# Patient Record
Sex: MALE | Race: WHITE | Employment: UNEMPLOYED | URBAN - METROPOLITAN AREA
[De-identification: names, ages, dates, MRNs, and addresses within clinical notes are randomized per-mention and may not be internally consistent; named-entity substitution may affect disease eponyms.]

---

## 2018-04-12 ENCOUNTER — HOSPITAL ENCOUNTER (EMERGENCY)
Age: 48
Discharge: LEFT BEFORE DISCHARGE | End: 2018-04-12
Payer: COMMERCIAL

## 2018-04-12 ENCOUNTER — APPOINTMENT (OUTPATIENT)
Dept: GENERAL RADIOLOGY | Age: 48
End: 2018-04-12
Payer: COMMERCIAL

## 2018-04-12 VITALS
HEART RATE: 78 BPM | DIASTOLIC BLOOD PRESSURE: 97 MMHG | SYSTOLIC BLOOD PRESSURE: 161 MMHG | HEIGHT: 73 IN | OXYGEN SATURATION: 98 % | TEMPERATURE: 98.4 F | WEIGHT: 133.8 LBS | BODY MASS INDEX: 17.73 KG/M2 | RESPIRATION RATE: 18 BRPM

## 2018-04-12 DIAGNOSIS — J44.1 COPD EXACERBATION (HCC): Primary | ICD-10-CM

## 2018-04-12 DIAGNOSIS — S92.502A CLOSED DISPLACED FRACTURE OF PHALANX OF LESSER TOE OF LEFT FOOT, UNSPECIFIED PHALANX, INITIAL ENCOUNTER: ICD-10-CM

## 2018-04-12 DIAGNOSIS — Z53.29 LEFT AGAINST MEDICAL ADVICE: ICD-10-CM

## 2018-04-12 DIAGNOSIS — L03.032 CELLULITIS OF TOE OF LEFT FOOT: ICD-10-CM

## 2018-04-12 DIAGNOSIS — N17.9 ACUTE KIDNEY INJURY (HCC): ICD-10-CM

## 2018-04-12 LAB
ABSOLUTE EOS #: 0.5 K/UL (ref 0–0.4)
ABSOLUTE IMMATURE GRANULOCYTE: ABNORMAL K/UL (ref 0–0.3)
ABSOLUTE LYMPH #: 2.6 K/UL (ref 1–4.8)
ABSOLUTE MONO #: 1.2 K/UL (ref 0.2–0.8)
ANION GAP SERPL CALCULATED.3IONS-SCNC: 15 MMOL/L (ref 9–17)
BASOPHILS # BLD: 1 % (ref 0–2)
BASOPHILS ABSOLUTE: 0.1 K/UL (ref 0–0.2)
BILIRUBIN URINE: NEGATIVE
BNP INTERPRETATION: NORMAL
BUN BLDV-MCNC: 16 MG/DL (ref 6–20)
BUN/CREAT BLD: 12 (ref 9–20)
C-REACTIVE PROTEIN: 2.5 MG/L (ref 0–5)
CALCIUM SERPL-MCNC: 9.9 MG/DL (ref 8.6–10.4)
CHLORIDE BLD-SCNC: 98 MMOL/L (ref 98–107)
CO2: 25 MMOL/L (ref 20–31)
COLOR: YELLOW
COMMENT UA: ABNORMAL
CREAT SERPL-MCNC: 1.34 MG/DL (ref 0.7–1.2)
DIFFERENTIAL TYPE: ABNORMAL
EKG ATRIAL RATE: 69 BPM
EKG P AXIS: 77 DEGREES
EKG P-R INTERVAL: 138 MS
EKG Q-T INTERVAL: 374 MS
EKG QRS DURATION: 98 MS
EKG QTC CALCULATION (BAZETT): 400 MS
EKG R AXIS: 85 DEGREES
EKG T AXIS: 75 DEGREES
EKG VENTRICULAR RATE: 69 BPM
EOSINOPHILS RELATIVE PERCENT: 3 % (ref 1–4)
GFR AFRICAN AMERICAN: >60 ML/MIN
GFR NON-AFRICAN AMERICAN: 57 ML/MIN
GFR SERPL CREATININE-BSD FRML MDRD: ABNORMAL ML/MIN/{1.73_M2}
GFR SERPL CREATININE-BSD FRML MDRD: ABNORMAL ML/MIN/{1.73_M2}
GLUCOSE BLD-MCNC: 114 MG/DL (ref 70–99)
GLUCOSE URINE: NEGATIVE
HCT VFR BLD CALC: 45.9 % (ref 41–53)
HEMOGLOBIN: 15.5 G/DL (ref 13.5–17.5)
IMMATURE GRANULOCYTES: ABNORMAL %
KETONES, URINE: NEGATIVE
LACTIC ACID: 1.6 MMOL/L (ref 0.5–2.2)
LACTIC ACID: 2.5 MMOL/L (ref 0.5–2.2)
LEUKOCYTE ESTERASE, URINE: NEGATIVE
LYMPHOCYTES # BLD: 18 % (ref 24–44)
MAGNESIUM: 2 MG/DL (ref 1.6–2.6)
MCH RBC QN AUTO: 33.1 PG (ref 26–34)
MCHC RBC AUTO-ENTMCNC: 33.7 G/DL (ref 31–37)
MCV RBC AUTO: 98.4 FL (ref 80–100)
MONOCYTES # BLD: 8 % (ref 1–7)
MYOGLOBIN: 28 NG/ML (ref 28–72)
NITRITE, URINE: NEGATIVE
NRBC AUTOMATED: ABNORMAL PER 100 WBC
PDW BLD-RTO: 18.5 % (ref 11.5–14.5)
PH UA: 6 (ref 5–8)
PLATELET # BLD: 824 K/UL (ref 130–400)
PLATELET ESTIMATE: ABNORMAL
PMV BLD AUTO: 7.1 FL (ref 6–12)
POTASSIUM SERPL-SCNC: 4.2 MMOL/L (ref 3.7–5.3)
PRO-BNP: 144 PG/ML
PROTEIN UA: NEGATIVE
RBC # BLD: 4.67 M/UL (ref 4.5–5.9)
RBC # BLD: ABNORMAL 10*6/UL
SEDIMENTATION RATE, ERYTHROCYTE: 16 MM (ref 0–15)
SEG NEUTROPHILS: 70 % (ref 36–66)
SEGMENTED NEUTROPHILS ABSOLUTE COUNT: 10.4 K/UL (ref 1.8–7.7)
SODIUM BLD-SCNC: 138 MMOL/L (ref 135–144)
SPECIFIC GRAVITY UA: 1 (ref 1–1.03)
TROPONIN INTERP: NORMAL
TROPONIN T: <0.03 NG/ML
TURBIDITY: CLEAR
URINE HGB: NEGATIVE
UROBILINOGEN, URINE: NORMAL
WBC # BLD: 14.8 K/UL (ref 3.5–11)
WBC # BLD: ABNORMAL 10*3/UL

## 2018-04-12 PROCEDURE — 83605 ASSAY OF LACTIC ACID: CPT

## 2018-04-12 PROCEDURE — 83735 ASSAY OF MAGNESIUM: CPT

## 2018-04-12 PROCEDURE — 93005 ELECTROCARDIOGRAM TRACING: CPT

## 2018-04-12 PROCEDURE — 36415 COLL VENOUS BLD VENIPUNCTURE: CPT

## 2018-04-12 PROCEDURE — 84484 ASSAY OF TROPONIN QUANT: CPT

## 2018-04-12 PROCEDURE — 6360000002 HC RX W HCPCS: Performed by: NURSE PRACTITIONER

## 2018-04-12 PROCEDURE — 83880 ASSAY OF NATRIURETIC PEPTIDE: CPT

## 2018-04-12 PROCEDURE — 71046 X-RAY EXAM CHEST 2 VIEWS: CPT

## 2018-04-12 PROCEDURE — 80048 BASIC METABOLIC PNL TOTAL CA: CPT

## 2018-04-12 PROCEDURE — 94640 AIRWAY INHALATION TREATMENT: CPT

## 2018-04-12 PROCEDURE — 83874 ASSAY OF MYOGLOBIN: CPT

## 2018-04-12 PROCEDURE — 85025 COMPLETE CBC W/AUTO DIFF WBC: CPT

## 2018-04-12 PROCEDURE — 87040 BLOOD CULTURE FOR BACTERIA: CPT

## 2018-04-12 PROCEDURE — 96374 THER/PROPH/DIAG INJ IV PUSH: CPT

## 2018-04-12 PROCEDURE — 99285 EMERGENCY DEPT VISIT HI MDM: CPT

## 2018-04-12 PROCEDURE — 73630 X-RAY EXAM OF FOOT: CPT

## 2018-04-12 PROCEDURE — 85651 RBC SED RATE NONAUTOMATED: CPT

## 2018-04-12 PROCEDURE — 94150 VITAL CAPACITY TEST: CPT

## 2018-04-12 PROCEDURE — 2580000003 HC RX 258: Performed by: NURSE PRACTITIONER

## 2018-04-12 PROCEDURE — 81003 URINALYSIS AUTO W/O SCOPE: CPT

## 2018-04-12 PROCEDURE — 86140 C-REACTIVE PROTEIN: CPT

## 2018-04-12 PROCEDURE — 96375 TX/PRO/DX INJ NEW DRUG ADDON: CPT

## 2018-04-12 RX ORDER — METHYLPREDNISOLONE SODIUM SUCCINATE 125 MG/2ML
125 INJECTION, POWDER, LYOPHILIZED, FOR SOLUTION INTRAMUSCULAR; INTRAVENOUS ONCE
Status: COMPLETED | OUTPATIENT
Start: 2018-04-12 | End: 2018-04-12

## 2018-04-12 RX ORDER — ALBUTEROL SULFATE 90 UG/1
2 AEROSOL, METERED RESPIRATORY (INHALATION)
Status: DISCONTINUED | OUTPATIENT
Start: 2018-04-12 | End: 2018-04-12 | Stop reason: HOSPADM

## 2018-04-12 RX ORDER — MORPHINE SULFATE 4 MG/ML
4 INJECTION, SOLUTION INTRAMUSCULAR; INTRAVENOUS ONCE
Status: COMPLETED | OUTPATIENT
Start: 2018-04-12 | End: 2018-04-12

## 2018-04-12 RX ORDER — AMLODIPINE BESYLATE 10 MG/1
10 TABLET ORAL DAILY
COMMUNITY
End: 2018-04-20 | Stop reason: SDUPTHER

## 2018-04-12 RX ORDER — CLONIDINE HYDROCHLORIDE 0.1 MG/1
0.1 TABLET ORAL 2 TIMES DAILY
Qty: 20 TABLET | Refills: 0 | Status: SHIPPED | OUTPATIENT
Start: 2018-04-12 | End: 2018-04-20

## 2018-04-12 RX ORDER — AMLODIPINE BESYLATE 10 MG/1
10 TABLET ORAL DAILY
Qty: 10 TABLET | Refills: 0 | Status: SHIPPED | OUTPATIENT
Start: 2018-04-12 | End: 2018-04-20

## 2018-04-12 RX ORDER — ALBUTEROL SULFATE 90 UG/1
2 AEROSOL, METERED RESPIRATORY (INHALATION) EVERY 6 HOURS PRN
Qty: 1 INHALER | Refills: 0 | Status: SHIPPED | OUTPATIENT
Start: 2018-04-12 | End: 2018-09-14 | Stop reason: SDUPTHER

## 2018-04-12 RX ORDER — 0.9 % SODIUM CHLORIDE 0.9 %
1000 INTRAVENOUS SOLUTION INTRAVENOUS ONCE
Status: COMPLETED | OUTPATIENT
Start: 2018-04-12 | End: 2018-04-12

## 2018-04-12 RX ORDER — LISINOPRIL 5 MG/1
5 TABLET ORAL DAILY
Qty: 30 TABLET | Refills: 0 | Status: SHIPPED | OUTPATIENT
Start: 2018-04-12 | End: 2018-04-12

## 2018-04-12 RX ORDER — LISINOPRIL 5 MG/1
5 TABLET ORAL DAILY
Qty: 10 TABLET | Refills: 0 | Status: SHIPPED | OUTPATIENT
Start: 2018-04-12 | End: 2018-04-20

## 2018-04-12 RX ORDER — IPRATROPIUM BROMIDE AND ALBUTEROL SULFATE 2.5; .5 MG/3ML; MG/3ML
1 SOLUTION RESPIRATORY (INHALATION)
Status: DISCONTINUED | OUTPATIENT
Start: 2018-04-12 | End: 2018-04-12 | Stop reason: HOSPADM

## 2018-04-12 RX ORDER — DOXYCYCLINE HYCLATE 100 MG
100 TABLET ORAL 2 TIMES DAILY
Qty: 20 TABLET | Refills: 0 | Status: SHIPPED | OUTPATIENT
Start: 2018-04-12 | End: 2018-04-22

## 2018-04-12 RX ORDER — ALBUTEROL SULFATE 90 UG/1
2 AEROSOL, METERED RESPIRATORY (INHALATION) EVERY 6 HOURS PRN
COMMUNITY
End: 2018-05-08 | Stop reason: SDUPTHER

## 2018-04-12 RX ORDER — CLOPIDOGREL BISULFATE 75 MG/1
75 TABLET ORAL DAILY
COMMUNITY
End: 2018-04-20 | Stop reason: SDUPTHER

## 2018-04-12 RX ORDER — BENZONATATE 100 MG/1
100 CAPSULE ORAL 3 TIMES DAILY PRN
Qty: 21 CAPSULE | Refills: 0 | Status: SHIPPED | OUTPATIENT
Start: 2018-04-12 | End: 2018-04-19

## 2018-04-12 RX ORDER — CLONIDINE HYDROCHLORIDE 0.1 MG/1
0.1 TABLET ORAL 2 TIMES DAILY
Qty: 60 TABLET | Refills: 0 | Status: SHIPPED | OUTPATIENT
Start: 2018-04-12 | End: 2018-04-12

## 2018-04-12 RX ORDER — LISINOPRIL 5 MG/1
5 TABLET ORAL DAILY
COMMUNITY
End: 2018-04-20

## 2018-04-12 RX ORDER — HYDROXYUREA 500 MG/1
1000 CAPSULE ORAL DAILY
Status: ON HOLD | COMMUNITY
End: 2018-04-30 | Stop reason: ALTCHOICE

## 2018-04-12 RX ORDER — AMLODIPINE BESYLATE 10 MG/1
10 TABLET ORAL DAILY
Qty: 30 TABLET | Refills: 0 | Status: SHIPPED | OUTPATIENT
Start: 2018-04-12 | End: 2018-04-12

## 2018-04-12 RX ORDER — CLONIDINE HYDROCHLORIDE 0.1 MG/1
0.1 TABLET ORAL 2 TIMES DAILY
COMMUNITY
End: 2018-04-20

## 2018-04-12 RX ORDER — CEPHALEXIN 500 MG/1
500 CAPSULE ORAL 4 TIMES DAILY
Qty: 40 CAPSULE | Refills: 0 | Status: SHIPPED | OUTPATIENT
Start: 2018-04-12 | End: 2018-04-22

## 2018-04-12 RX ORDER — ALBUTEROL SULFATE 2.5 MG/3ML
5 SOLUTION RESPIRATORY (INHALATION)
Status: DISCONTINUED | OUTPATIENT
Start: 2018-04-12 | End: 2018-04-12 | Stop reason: HOSPADM

## 2018-04-12 RX ADMIN — ALBUTEROL SULFATE 5 MG: 5 SOLUTION RESPIRATORY (INHALATION) at 16:52

## 2018-04-12 RX ADMIN — METHYLPREDNISOLONE SODIUM SUCCINATE 125 MG: 125 INJECTION, POWDER, FOR SOLUTION INTRAMUSCULAR; INTRAVENOUS at 16:44

## 2018-04-12 RX ADMIN — CEFTRIAXONE SODIUM 1 G: 1 INJECTION, POWDER, FOR SOLUTION INTRAMUSCULAR; INTRAVENOUS at 18:51

## 2018-04-12 RX ADMIN — MORPHINE SULFATE 4 MG: 4 INJECTION INTRAVENOUS at 16:44

## 2018-04-12 RX ADMIN — ALBUTEROL SULFATE 5 MG: 5 SOLUTION RESPIRATORY (INHALATION) at 16:42

## 2018-04-12 RX ADMIN — SODIUM CHLORIDE 1000 ML: 9 INJECTION, SOLUTION INTRAVENOUS at 16:44

## 2018-04-12 ASSESSMENT — ENCOUNTER SYMPTOMS
COLOR CHANGE: 1
COUGH: 1
WHEEZING: 1
SHORTNESS OF BREATH: 1

## 2018-04-12 ASSESSMENT — PAIN DESCRIPTION - DESCRIPTORS: DESCRIPTORS: THROBBING;SHARP

## 2018-04-12 ASSESSMENT — PAIN DESCRIPTION - LOCATION: LOCATION: FOOT

## 2018-04-12 ASSESSMENT — PAIN DESCRIPTION - ORIENTATION: ORIENTATION: LEFT

## 2018-04-12 ASSESSMENT — PAIN SCALES - GENERAL
PAINLEVEL_OUTOF10: 5
PAINLEVEL_OUTOF10: 5

## 2018-04-12 ASSESSMENT — PAIN DESCRIPTION - FREQUENCY: FREQUENCY: CONTINUOUS

## 2018-04-18 LAB
CULTURE: NORMAL
Lab: NORMAL
Lab: NORMAL
SPECIMEN DESCRIPTION: NORMAL
STATUS: NORMAL
STATUS: NORMAL

## 2018-04-20 ENCOUNTER — OFFICE VISIT (OUTPATIENT)
Dept: INTERNAL MEDICINE | Age: 48
End: 2018-04-20
Payer: COMMERCIAL

## 2018-04-20 VITALS
SYSTOLIC BLOOD PRESSURE: 148 MMHG | BODY MASS INDEX: 17.23 KG/M2 | HEIGHT: 73 IN | DIASTOLIC BLOOD PRESSURE: 88 MMHG | WEIGHT: 130 LBS | HEART RATE: 71 BPM

## 2018-04-20 DIAGNOSIS — Z13.220 SCREENING FOR HYPERLIPIDEMIA: ICD-10-CM

## 2018-04-20 DIAGNOSIS — I99.8 ISCHEMIA OF TOE: ICD-10-CM

## 2018-04-20 DIAGNOSIS — I73.9 PVD (PERIPHERAL VASCULAR DISEASE) (HCC): ICD-10-CM

## 2018-04-20 DIAGNOSIS — I10 ESSENTIAL HYPERTENSION: Primary | ICD-10-CM

## 2018-04-20 DIAGNOSIS — S92.335A CLOSED NONDISPLACED FRACTURE OF THIRD METATARSAL BONE OF LEFT FOOT, INITIAL ENCOUNTER: ICD-10-CM

## 2018-04-20 DIAGNOSIS — Z00.00 HEALTH CARE MAINTENANCE: ICD-10-CM

## 2018-04-20 DIAGNOSIS — N18.30 STAGE 3 CHRONIC KIDNEY DISEASE (HCC): ICD-10-CM

## 2018-04-20 PROCEDURE — 99204 OFFICE O/P NEW MOD 45 MIN: CPT | Performed by: INTERNAL MEDICINE

## 2018-04-20 PROCEDURE — G8427 DOCREV CUR MEDS BY ELIG CLIN: HCPCS | Performed by: INTERNAL MEDICINE

## 2018-04-20 PROCEDURE — 99215 OFFICE O/P EST HI 40 MIN: CPT

## 2018-04-20 PROCEDURE — G8419 CALC BMI OUT NRM PARAM NOF/U: HCPCS | Performed by: INTERNAL MEDICINE

## 2018-04-20 PROCEDURE — 4004F PT TOBACCO SCREEN RCVD TLK: CPT | Performed by: INTERNAL MEDICINE

## 2018-04-20 RX ORDER — CLOPIDOGREL BISULFATE 75 MG/1
75 TABLET ORAL DAILY
Qty: 90 TABLET | Refills: 1 | Status: SHIPPED | OUTPATIENT
Start: 2018-04-20 | End: 2018-09-14 | Stop reason: SDUPTHER

## 2018-04-20 RX ORDER — TRAMADOL HYDROCHLORIDE 50 MG/1
50 TABLET ORAL DAILY PRN
Qty: 30 TABLET | Refills: 0 | Status: ON HOLD | OUTPATIENT
Start: 2018-04-20 | End: 2018-05-02 | Stop reason: HOSPADM

## 2018-04-20 RX ORDER — AMLODIPINE BESYLATE 5 MG/1
5 TABLET ORAL DAILY
Qty: 30 TABLET | Refills: 2 | Status: SHIPPED | OUTPATIENT
Start: 2018-04-20 | End: 2018-07-09 | Stop reason: SDUPTHER

## 2018-04-20 RX ORDER — ATORVASTATIN CALCIUM 40 MG/1
40 TABLET, FILM COATED ORAL DAILY
Qty: 30 TABLET | Refills: 2 | Status: SHIPPED | OUTPATIENT
Start: 2018-04-20 | End: 2018-07-09 | Stop reason: SDUPTHER

## 2018-04-20 ASSESSMENT — PATIENT HEALTH QUESTIONNAIRE - PHQ9
SUM OF ALL RESPONSES TO PHQ QUESTIONS 1-9: 0
2. FEELING DOWN, DEPRESSED OR HOPELESS: 0
SUM OF ALL RESPONSES TO PHQ9 QUESTIONS 1 & 2: 0
1. LITTLE INTEREST OR PLEASURE IN DOING THINGS: 0

## 2018-04-30 ENCOUNTER — HOSPITAL ENCOUNTER (INPATIENT)
Age: 48
LOS: 2 days | Discharge: HOME | DRG: 207 | End: 2018-05-02
Attending: PODIATRIST | Admitting: PODIATRIST
Payer: COMMERCIAL

## 2018-04-30 ENCOUNTER — HOSPITAL ENCOUNTER (OUTPATIENT)
Age: 48
Setting detail: SPECIMEN
Discharge: HOME | End: 2018-04-30
Payer: COMMERCIAL

## 2018-04-30 ENCOUNTER — OFFICE VISIT (OUTPATIENT)
Dept: PODIATRY | Age: 48
End: 2018-04-30
Payer: COMMERCIAL

## 2018-04-30 ENCOUNTER — HOSPITAL ENCOUNTER (OUTPATIENT)
Age: 48
Setting detail: SPECIMEN
DRG: 207 | End: 2018-04-30
Attending: PODIATRIST
Payer: COMMERCIAL

## 2018-04-30 VITALS
DIASTOLIC BLOOD PRESSURE: 88 MMHG | BODY MASS INDEX: 16.38 KG/M2 | HEART RATE: 85 BPM | WEIGHT: 127.6 LBS | HEIGHT: 74 IN | SYSTOLIC BLOOD PRESSURE: 130 MMHG

## 2018-04-30 DIAGNOSIS — I73.9 PVD (PERIPHERAL VASCULAR DISEASE) (HCC): Primary | ICD-10-CM

## 2018-04-30 DIAGNOSIS — I99.8 ISCHEMIA OF TOE: ICD-10-CM

## 2018-04-30 DIAGNOSIS — I70.229 CRITICAL LOWER LIMB ISCHEMIA (HCC): Primary | ICD-10-CM

## 2018-04-30 DIAGNOSIS — I70.229 CRITICAL LOWER LIMB ISCHEMIA (HCC): ICD-10-CM

## 2018-04-30 DIAGNOSIS — I96 GANGRENE OF TOE OF LEFT FOOT (HCC): ICD-10-CM

## 2018-04-30 DIAGNOSIS — I73.9 PAD (PERIPHERAL ARTERY DISEASE) (HCC): ICD-10-CM

## 2018-04-30 DIAGNOSIS — S92.335A CLOSED NONDISPLACED FRACTURE OF THIRD METATARSAL BONE OF LEFT FOOT, INITIAL ENCOUNTER: ICD-10-CM

## 2018-04-30 LAB
ANION GAP SERPL CALCULATED.3IONS-SCNC: 12 MMOL/L (ref 9–17)
BUN BLDV-MCNC: 19 MG/DL (ref 6–20)
BUN/CREAT BLD: NORMAL (ref 9–20)
CALCIUM SERPL-MCNC: 9.3 MG/DL (ref 8.6–10.4)
CHLORIDE BLD-SCNC: 103 MMOL/L (ref 98–107)
CO2: 23 MMOL/L (ref 20–31)
CREAT SERPL-MCNC: 0.92 MG/DL (ref 0.7–1.2)
GFR AFRICAN AMERICAN: >60 ML/MIN
GFR NON-AFRICAN AMERICAN: >60 ML/MIN
GFR SERPL CREATININE-BSD FRML MDRD: NORMAL ML/MIN/{1.73_M2}
GFR SERPL CREATININE-BSD FRML MDRD: NORMAL ML/MIN/{1.73_M2}
GLUCOSE BLD-MCNC: 81 MG/DL (ref 70–99)
HCT VFR BLD CALC: 41.3 % (ref 40.7–50.3)
HEMOGLOBIN: 13.4 G/DL (ref 13–17)
MCH RBC QN AUTO: 32.1 PG (ref 25.2–33.5)
MCHC RBC AUTO-ENTMCNC: 32.4 G/DL (ref 28.4–34.8)
MCV RBC AUTO: 98.8 FL (ref 82.6–102.9)
NRBC AUTOMATED: 0 PER 100 WBC
PARTIAL THROMBOPLASTIN TIME: 29.4 SEC (ref 20.5–30.5)
PDW BLD-RTO: 15.7 % (ref 11.8–14.4)
PLATELET # BLD: ABNORMAL K/UL (ref 138–453)
PLATELET, FLUORESCENCE: 1298 K/UL (ref 138–453)
PLATELET, IMMATURE FRACTION: 3.4 % (ref 1.1–10.3)
PMV BLD AUTO: ABNORMAL FL (ref 8.1–13.5)
POTASSIUM SERPL-SCNC: 4.4 MMOL/L (ref 3.7–5.3)
RBC # BLD: 4.18 M/UL (ref 4.21–5.77)
SODIUM BLD-SCNC: 138 MMOL/L (ref 135–144)
WBC # BLD: 15.5 K/UL (ref 3.5–11.3)

## 2018-04-30 PROCEDURE — 85730 THROMBOPLASTIN TIME PARTIAL: CPT

## 2018-04-30 PROCEDURE — 4004F PT TOBACCO SCREEN RCVD TLK: CPT | Performed by: PODIATRIST

## 2018-04-30 PROCEDURE — 85055 RETICULATED PLATELET ASSAY: CPT

## 2018-04-30 PROCEDURE — 87081 CULTURE SCREEN ONLY: CPT | Performed by: PODIATRIST

## 2018-04-30 PROCEDURE — 85027 COMPLETE CBC AUTOMATED: CPT

## 2018-04-30 PROCEDURE — 1200000000 HC SEMI PRIVATE

## 2018-04-30 PROCEDURE — 36415 COLL VENOUS BLD VENIPUNCTURE: CPT

## 2018-04-30 PROCEDURE — 99203 OFFICE O/P NEW LOW 30 MIN: CPT | Performed by: PODIATRIST

## 2018-04-30 PROCEDURE — G8427 DOCREV CUR MEDS BY ELIG CLIN: HCPCS | Performed by: PODIATRIST

## 2018-04-30 PROCEDURE — G8419 CALC BMI OUT NRM PARAM NOF/U: HCPCS | Performed by: PODIATRIST

## 2018-04-30 PROCEDURE — 6370000000 HC RX 637 (ALT 250 FOR IP): Performed by: PODIATRIST

## 2018-04-30 PROCEDURE — 80048 BASIC METABOLIC PNL TOTAL CA: CPT

## 2018-04-30 PROCEDURE — 2580000003 HC RX 258: Performed by: PODIATRIST

## 2018-04-30 PROCEDURE — 6360000002 HC RX W HCPCS: Performed by: STUDENT IN AN ORGANIZED HEALTH CARE EDUCATION/TRAINING PROGRAM

## 2018-04-30 RX ORDER — SODIUM CHLORIDE 0.9 % (FLUSH) 0.9 %
10 SYRINGE (ML) INJECTION PRN
Status: DISCONTINUED | OUTPATIENT
Start: 2018-04-30 | End: 2018-05-03 | Stop reason: HOSPADM

## 2018-04-30 RX ORDER — TRAMADOL HYDROCHLORIDE 50 MG/1
50 TABLET ORAL DAILY PRN
Status: DISCONTINUED | OUTPATIENT
Start: 2018-04-30 | End: 2018-05-03 | Stop reason: HOSPADM

## 2018-04-30 RX ORDER — CLOPIDOGREL BISULFATE 75 MG/1
75 TABLET ORAL DAILY
Status: DISCONTINUED | OUTPATIENT
Start: 2018-04-30 | End: 2018-05-03 | Stop reason: HOSPADM

## 2018-04-30 RX ORDER — HEPARIN SODIUM 5000 [USP'U]/ML
5000 INJECTION, SOLUTION INTRAVENOUS; SUBCUTANEOUS EVERY 8 HOURS SCHEDULED
Status: DISCONTINUED | OUTPATIENT
Start: 2018-04-30 | End: 2018-04-30

## 2018-04-30 RX ORDER — HEPARIN SODIUM 10000 [USP'U]/100ML
18 INJECTION, SOLUTION INTRAVENOUS CONTINUOUS
Status: DISCONTINUED | OUTPATIENT
Start: 2018-04-30 | End: 2018-05-02

## 2018-04-30 RX ORDER — ALBUTEROL SULFATE 90 UG/1
2 AEROSOL, METERED RESPIRATORY (INHALATION) EVERY 6 HOURS PRN
Status: DISCONTINUED | OUTPATIENT
Start: 2018-04-30 | End: 2018-05-03 | Stop reason: HOSPADM

## 2018-04-30 RX ORDER — ONDANSETRON 2 MG/ML
4 INJECTION INTRAMUSCULAR; INTRAVENOUS EVERY 6 HOURS PRN
Status: DISCONTINUED | OUTPATIENT
Start: 2018-04-30 | End: 2018-05-03 | Stop reason: HOSPADM

## 2018-04-30 RX ORDER — ATORVASTATIN CALCIUM 40 MG/1
40 TABLET, FILM COATED ORAL DAILY
Status: DISCONTINUED | OUTPATIENT
Start: 2018-04-30 | End: 2018-05-03 | Stop reason: HOSPADM

## 2018-04-30 RX ORDER — HEPARIN SODIUM 1000 [USP'U]/ML
80 INJECTION, SOLUTION INTRAVENOUS; SUBCUTANEOUS PRN
Status: DISCONTINUED | OUTPATIENT
Start: 2018-04-30 | End: 2018-05-02

## 2018-04-30 RX ORDER — HYDROXYUREA 500 MG/1
1000 CAPSULE ORAL DAILY
Status: DISCONTINUED | OUTPATIENT
Start: 2018-04-30 | End: 2018-04-30

## 2018-04-30 RX ORDER — AMLODIPINE BESYLATE 5 MG/1
5 TABLET ORAL DAILY
Status: DISCONTINUED | OUTPATIENT
Start: 2018-04-30 | End: 2018-05-03 | Stop reason: HOSPADM

## 2018-04-30 RX ORDER — HEPARIN SODIUM 1000 [USP'U]/ML
80 INJECTION, SOLUTION INTRAVENOUS; SUBCUTANEOUS ONCE
Status: COMPLETED | OUTPATIENT
Start: 2018-04-30 | End: 2018-04-30

## 2018-04-30 RX ORDER — HEPARIN SODIUM 1000 [USP'U]/ML
40 INJECTION, SOLUTION INTRAVENOUS; SUBCUTANEOUS PRN
Status: DISCONTINUED | OUTPATIENT
Start: 2018-04-30 | End: 2018-05-02

## 2018-04-30 RX ORDER — SODIUM CHLORIDE 0.9 % (FLUSH) 0.9 %
10 SYRINGE (ML) INJECTION EVERY 12 HOURS SCHEDULED
Status: DISCONTINUED | OUTPATIENT
Start: 2018-04-30 | End: 2018-05-03 | Stop reason: HOSPADM

## 2018-04-30 RX ADMIN — HEPARIN SODIUM AND DEXTROSE 18 UNITS/KG/HR: 10000; 5 INJECTION INTRAVENOUS at 22:34

## 2018-04-30 RX ADMIN — Medication 10 ML: at 22:13

## 2018-04-30 RX ADMIN — TRAMADOL HYDROCHLORIDE 50 MG: 50 TABLET, FILM COATED ORAL at 22:13

## 2018-04-30 RX ADMIN — HEPARIN SODIUM 4610 UNITS: 1000 INJECTION, SOLUTION INTRAVENOUS; SUBCUTANEOUS at 22:34

## 2018-04-30 ASSESSMENT — PAIN DESCRIPTION - LOCATION: LOCATION: LEG

## 2018-04-30 ASSESSMENT — PAIN SCALES - GENERAL
PAINLEVEL_OUTOF10: 5
PAINLEVEL_OUTOF10: 6

## 2018-04-30 ASSESSMENT — PAIN DESCRIPTION - PAIN TYPE: TYPE: CHRONIC PAIN

## 2018-04-30 ASSESSMENT — PAIN DESCRIPTION - ORIENTATION: ORIENTATION: LEFT

## 2018-05-01 ENCOUNTER — APPOINTMENT (OUTPATIENT)
Dept: GENERAL RADIOLOGY | Age: 48
DRG: 207 | End: 2018-05-01
Attending: PODIATRIST
Payer: COMMERCIAL

## 2018-05-01 PROBLEM — E44.0 MODERATE MALNUTRITION (HCC): Chronic | Status: ACTIVE | Noted: 2018-05-01

## 2018-05-01 LAB
HCT VFR BLD CALC: 38.4 % (ref 40.7–50.3)
HEMOGLOBIN: 12.8 G/DL (ref 13–17)
MCH RBC QN AUTO: 32.3 PG (ref 25.2–33.5)
MCHC RBC AUTO-ENTMCNC: 33.3 G/DL (ref 28.4–34.8)
MCV RBC AUTO: 97 FL (ref 82.6–102.9)
NRBC AUTOMATED: 0 PER 100 WBC
PARTIAL THROMBOPLASTIN TIME: 30 SEC (ref 20.5–30.5)
PARTIAL THROMBOPLASTIN TIME: 31.2 SEC (ref 20.5–30.5)
PARTIAL THROMBOPLASTIN TIME: 38.6 SEC (ref 20.5–30.5)
PDW BLD-RTO: 15.6 % (ref 11.8–14.4)
PLATELET # BLD: 998 K/UL (ref 138–453)
PMV BLD AUTO: 9.4 FL (ref 8.1–13.5)
RBC # BLD: 3.96 M/UL (ref 4.21–5.77)
WBC # BLD: 13.6 K/UL (ref 3.5–11.3)

## 2018-05-01 PROCEDURE — 6360000002 HC RX W HCPCS: Performed by: STUDENT IN AN ORGANIZED HEALTH CARE EDUCATION/TRAINING PROGRAM

## 2018-05-01 PROCEDURE — G8978 MOBILITY CURRENT STATUS: HCPCS

## 2018-05-01 PROCEDURE — 99254 IP/OBS CNSLTJ NEW/EST MOD 60: CPT | Performed by: INTERNAL MEDICINE

## 2018-05-01 PROCEDURE — 85730 THROMBOPLASTIN TIME PARTIAL: CPT

## 2018-05-01 PROCEDURE — 85027 COMPLETE CBC AUTOMATED: CPT

## 2018-05-01 PROCEDURE — 73630 X-RAY EXAM OF FOOT: CPT

## 2018-05-01 PROCEDURE — 6370000000 HC RX 637 (ALT 250 FOR IP): Performed by: PODIATRIST

## 2018-05-01 PROCEDURE — 1200000000 HC SEMI PRIVATE

## 2018-05-01 PROCEDURE — 2500000003 HC RX 250 WO HCPCS: Performed by: STUDENT IN AN ORGANIZED HEALTH CARE EDUCATION/TRAINING PROGRAM

## 2018-05-01 PROCEDURE — 93923 UPR/LXTR ART STDY 3+ LVLS: CPT

## 2018-05-01 PROCEDURE — 2580000003 HC RX 258: Performed by: PODIATRIST

## 2018-05-01 PROCEDURE — 99253 IP/OBS CNSLTJ NEW/EST LOW 45: CPT | Performed by: FAMILY MEDICINE

## 2018-05-01 PROCEDURE — 36415 COLL VENOUS BLD VENIPUNCTURE: CPT

## 2018-05-01 PROCEDURE — 97161 PT EVAL LOW COMPLEX 20 MIN: CPT

## 2018-05-01 PROCEDURE — G8979 MOBILITY GOAL STATUS: HCPCS

## 2018-05-01 PROCEDURE — 97530 THERAPEUTIC ACTIVITIES: CPT

## 2018-05-01 PROCEDURE — G8980 MOBILITY D/C STATUS: HCPCS

## 2018-05-01 RX ORDER — VANCOMYCIN HYDROCHLORIDE 1 G/200ML
1000 INJECTION, SOLUTION INTRAVENOUS EVERY 12 HOURS
Status: DISCONTINUED | OUTPATIENT
Start: 2018-05-01 | End: 2018-05-01

## 2018-05-01 RX ADMIN — CLOPIDOGREL 75 MG: 75 TABLET, FILM COATED ORAL at 16:39

## 2018-05-01 RX ADMIN — TRAMADOL HYDROCHLORIDE 50 MG: 50 TABLET, FILM COATED ORAL at 19:14

## 2018-05-01 RX ADMIN — Medication 10 ML: at 20:50

## 2018-05-01 RX ADMIN — TAZOBACTAM SODIUM AND PIPERACILLIN SODIUM 3.38 G: 375; 3 INJECTION, SOLUTION INTRAVENOUS at 13:07

## 2018-05-01 RX ADMIN — HEPARIN SODIUM: 1000 INJECTION, SOLUTION INTRAVENOUS; SUBCUTANEOUS at 14:38

## 2018-05-01 RX ADMIN — HEPARIN SODIUM 2300 UNITS: 1000 INJECTION, SOLUTION INTRAVENOUS; SUBCUTANEOUS at 06:02

## 2018-05-01 RX ADMIN — VANCOMYCIN HYDROCHLORIDE 1000 MG: 1 INJECTION, SOLUTION INTRAVENOUS at 11:30

## 2018-05-01 RX ADMIN — HEPARIN SODIUM 2300 UNITS: 1000 INJECTION, SOLUTION INTRAVENOUS; SUBCUTANEOUS at 23:29

## 2018-05-01 RX ADMIN — TAZOBACTAM SODIUM AND PIPERACILLIN SODIUM 3.38 G: 375; 3 INJECTION, SOLUTION INTRAVENOUS at 20:00

## 2018-05-01 RX ADMIN — ATORVASTATIN CALCIUM 40 MG: 40 TABLET, FILM COATED ORAL at 00:55

## 2018-05-01 RX ADMIN — AMLODIPINE BESYLATE 5 MG: 5 TABLET ORAL at 16:39

## 2018-05-01 RX ADMIN — HEPARIN SODIUM AND DEXTROSE 22 UNITS/KG/HR: 10000; 5 INJECTION INTRAVENOUS at 14:36

## 2018-05-01 RX ADMIN — ATORVASTATIN CALCIUM 40 MG: 40 TABLET, FILM COATED ORAL at 20:49

## 2018-05-01 ASSESSMENT — ENCOUNTER SYMPTOMS
EYES NEGATIVE: 1
RESPIRATORY NEGATIVE: 1
GASTROINTESTINAL NEGATIVE: 1
ALLERGIC/IMMUNOLOGIC NEGATIVE: 1

## 2018-05-01 ASSESSMENT — PAIN DESCRIPTION - LOCATION: LOCATION: LEG

## 2018-05-01 ASSESSMENT — PAIN SCALES - GENERAL
PAINLEVEL_OUTOF10: 6
PAINLEVEL_OUTOF10: 6

## 2018-05-01 ASSESSMENT — PAIN DESCRIPTION - PAIN TYPE: TYPE: CHRONIC PAIN

## 2018-05-01 ASSESSMENT — PAIN DESCRIPTION - ORIENTATION: ORIENTATION: RIGHT

## 2018-05-02 VITALS
WEIGHT: 127 LBS | OXYGEN SATURATION: 97 % | DIASTOLIC BLOOD PRESSURE: 76 MMHG | BODY MASS INDEX: 16.3 KG/M2 | HEIGHT: 74 IN | RESPIRATION RATE: 20 BRPM | SYSTOLIC BLOOD PRESSURE: 105 MMHG | HEART RATE: 87 BPM | TEMPERATURE: 97.3 F

## 2018-05-02 LAB
CHOLESTEROL/HDL RATIO: 3
CHOLESTEROL: 89 MG/DL
ESTIMATED AVERAGE GLUCOSE: 91 MG/DL
HBA1C MFR BLD: 4.8 % (ref 4–6)
HDLC SERPL-MCNC: 30 MG/DL
LDL CHOLESTEROL: 41 MG/DL (ref 0–130)
PARTIAL THROMBOPLASTIN TIME: 41 SEC (ref 20.5–30.5)
TRIGL SERPL-MCNC: 91 MG/DL
VLDLC SERPL CALC-MCNC: ABNORMAL MG/DL (ref 1–30)

## 2018-05-02 PROCEDURE — 6370000000 HC RX 637 (ALT 250 FOR IP): Performed by: STUDENT IN AN ORGANIZED HEALTH CARE EDUCATION/TRAINING PROGRAM

## 2018-05-02 PROCEDURE — 99232 SBSQ HOSP IP/OBS MODERATE 35: CPT | Performed by: INTERNAL MEDICINE

## 2018-05-02 PROCEDURE — 6360000002 HC RX W HCPCS: Performed by: STUDENT IN AN ORGANIZED HEALTH CARE EDUCATION/TRAINING PROGRAM

## 2018-05-02 PROCEDURE — 85730 THROMBOPLASTIN TIME PARTIAL: CPT

## 2018-05-02 PROCEDURE — 87205 SMEAR GRAM STAIN: CPT

## 2018-05-02 PROCEDURE — 2580000003 HC RX 258: Performed by: PODIATRIST

## 2018-05-02 PROCEDURE — 87186 SC STD MICRODIL/AGAR DIL: CPT

## 2018-05-02 PROCEDURE — 99232 SBSQ HOSP IP/OBS MODERATE 35: CPT | Performed by: FAMILY MEDICINE

## 2018-05-02 PROCEDURE — 86403 PARTICLE AGGLUT ANTBDY SCRN: CPT

## 2018-05-02 PROCEDURE — 6370000000 HC RX 637 (ALT 250 FOR IP): Performed by: PODIATRIST

## 2018-05-02 PROCEDURE — 83036 HEMOGLOBIN GLYCOSYLATED A1C: CPT

## 2018-05-02 PROCEDURE — 36415 COLL VENOUS BLD VENIPUNCTURE: CPT

## 2018-05-02 PROCEDURE — 80061 LIPID PANEL: CPT

## 2018-05-02 PROCEDURE — 2500000003 HC RX 250 WO HCPCS: Performed by: STUDENT IN AN ORGANIZED HEALTH CARE EDUCATION/TRAINING PROGRAM

## 2018-05-02 PROCEDURE — 87070 CULTURE OTHR SPECIMN AEROBIC: CPT

## 2018-05-02 RX ORDER — ULTRASOUND COUPLING MEDIUM
GEL (GRAM) TOPICAL PRN
Status: DISCONTINUED | OUTPATIENT
Start: 2018-05-02 | End: 2018-05-03 | Stop reason: HOSPADM

## 2018-05-02 RX ORDER — DOXYCYCLINE HYCLATE 100 MG
100 TABLET ORAL EVERY 12 HOURS SCHEDULED
Qty: 28 TABLET | Refills: 0 | Status: SHIPPED | OUTPATIENT
Start: 2018-05-02 | End: 2018-05-15 | Stop reason: ALTCHOICE

## 2018-05-02 RX ORDER — LEVOFLOXACIN 500 MG/1
500 TABLET, FILM COATED ORAL DAILY
Status: DISCONTINUED | OUTPATIENT
Start: 2018-05-02 | End: 2018-05-03 | Stop reason: HOSPADM

## 2018-05-02 RX ORDER — DOXYCYCLINE HYCLATE 100 MG
100 TABLET ORAL EVERY 12 HOURS SCHEDULED
Status: DISCONTINUED | OUTPATIENT
Start: 2018-05-02 | End: 2018-05-03 | Stop reason: HOSPADM

## 2018-05-02 RX ORDER — TRAMADOL HYDROCHLORIDE 50 MG/1
50 TABLET ORAL EVERY 6 HOURS PRN
Qty: 28 TABLET | Refills: 0 | Status: ON HOLD | OUTPATIENT
Start: 2018-05-02 | End: 2018-12-18 | Stop reason: HOSPADM

## 2018-05-02 RX ORDER — EMOLLIENT COMBINATION NO.60
GEL (GRAM) TOPICAL
Qty: 1 TUBE | Refills: 0 | Status: SHIPPED | OUTPATIENT
Start: 2018-05-02 | End: 2018-06-05 | Stop reason: ALTCHOICE

## 2018-05-02 RX ORDER — LEVOFLOXACIN 500 MG/1
500 TABLET, FILM COATED ORAL DAILY
Qty: 14 TABLET | Refills: 0 | Status: SHIPPED | OUTPATIENT
Start: 2018-05-02 | End: 2018-05-15 | Stop reason: ALTCHOICE

## 2018-05-02 RX ORDER — GAUZE BANDAGE 2.25"X108"
BANDAGE TOPICAL
Qty: 96 EACH | Refills: 1 | Status: SHIPPED | OUTPATIENT
Start: 2018-05-02 | End: 2018-08-24 | Stop reason: SDUPTHER

## 2018-05-02 RX ORDER — GAUZE BANDAGE 4" X 4"
1 BANDAGE TOPICAL DAILY
Qty: 324 EACH | Refills: 1 | Status: SHIPPED | OUTPATIENT
Start: 2018-05-02 | End: 2018-06-05 | Stop reason: ALTCHOICE

## 2018-05-02 RX ADMIN — TRAMADOL HYDROCHLORIDE 50 MG: 50 TABLET, FILM COATED ORAL at 04:44

## 2018-05-02 RX ADMIN — HEPARIN SODIUM 2300 UNITS: 1000 INJECTION, SOLUTION INTRAVENOUS; SUBCUTANEOUS at 07:02

## 2018-05-02 RX ADMIN — HEPARIN SODIUM AND DEXTROSE 26 UNITS/KG/HR: 10000; 5 INJECTION INTRAVENOUS at 07:02

## 2018-05-02 RX ADMIN — TAZOBACTAM SODIUM AND PIPERACILLIN SODIUM 3.38 G: 375; 3 INJECTION, SOLUTION INTRAVENOUS at 13:00

## 2018-05-02 RX ADMIN — Medication 10 ML: at 09:00

## 2018-05-02 RX ADMIN — TRAMADOL HYDROCHLORIDE 50 MG: 50 TABLET, FILM COATED ORAL at 14:41

## 2018-05-02 RX ADMIN — TAZOBACTAM SODIUM AND PIPERACILLIN SODIUM 3.38 G: 375; 3 INJECTION, SOLUTION INTRAVENOUS at 04:00

## 2018-05-02 RX ADMIN — APIXABAN 10 MG: 5 TABLET, FILM COATED ORAL at 09:06

## 2018-05-02 RX ADMIN — TRAMADOL HYDROCHLORIDE 50 MG: 50 TABLET, FILM COATED ORAL at 09:06

## 2018-05-02 ASSESSMENT — PAIN SCALES - GENERAL
PAINLEVEL_OUTOF10: 5
PAINLEVEL_OUTOF10: 8
PAINLEVEL_OUTOF10: 4

## 2018-05-02 ASSESSMENT — ENCOUNTER SYMPTOMS
EYES NEGATIVE: 1
GASTROINTESTINAL NEGATIVE: 1
RESPIRATORY NEGATIVE: 1
ALLERGIC/IMMUNOLOGIC NEGATIVE: 1

## 2018-05-02 ASSESSMENT — PAIN DESCRIPTION - LOCATION: LOCATION: FOOT

## 2018-05-02 ASSESSMENT — PAIN DESCRIPTION - ORIENTATION: ORIENTATION: RIGHT

## 2018-05-04 ENCOUNTER — OFFICE VISIT (OUTPATIENT)
Dept: VASCULAR SURGERY | Age: 48
End: 2018-05-04
Payer: COMMERCIAL

## 2018-05-04 VITALS
HEART RATE: 75 BPM | HEIGHT: 74 IN | DIASTOLIC BLOOD PRESSURE: 62 MMHG | RESPIRATION RATE: 19 BRPM | OXYGEN SATURATION: 94 % | WEIGHT: 126.98 LBS | BODY MASS INDEX: 16.3 KG/M2 | SYSTOLIC BLOOD PRESSURE: 112 MMHG

## 2018-05-04 DIAGNOSIS — I70.229 CRITICAL LOWER LIMB ISCHEMIA (HCC): Primary | ICD-10-CM

## 2018-05-04 DIAGNOSIS — I96 GANGRENE OF TOE OF LEFT FOOT (HCC): ICD-10-CM

## 2018-05-04 LAB
CULTURE: ABNORMAL
DIRECT EXAM: ABNORMAL
Lab: ABNORMAL
Lab: ABNORMAL
ORGANISM: ABNORMAL
ORGANISM: ABNORMAL
SPECIMEN DESCRIPTION: ABNORMAL
SPECIMEN DESCRIPTION: ABNORMAL
STATUS: ABNORMAL
STATUS: ABNORMAL

## 2018-05-04 PROCEDURE — G8419 CALC BMI OUT NRM PARAM NOF/U: HCPCS | Performed by: SURGERY

## 2018-05-04 PROCEDURE — G8427 DOCREV CUR MEDS BY ELIG CLIN: HCPCS | Performed by: SURGERY

## 2018-05-04 PROCEDURE — 1111F DSCHRG MED/CURRENT MED MERGE: CPT | Performed by: SURGERY

## 2018-05-04 PROCEDURE — 4004F PT TOBACCO SCREEN RCVD TLK: CPT | Performed by: SURGERY

## 2018-05-04 PROCEDURE — 99213 OFFICE O/P EST LOW 20 MIN: CPT | Performed by: SURGERY

## 2018-05-07 ENCOUNTER — OFFICE VISIT (OUTPATIENT)
Dept: PODIATRY | Age: 48
End: 2018-05-07
Payer: COMMERCIAL

## 2018-05-07 VITALS — HEART RATE: 77 BPM | SYSTOLIC BLOOD PRESSURE: 116 MMHG | DIASTOLIC BLOOD PRESSURE: 86 MMHG

## 2018-05-07 DIAGNOSIS — I96 GANGRENE OF TOE OF LEFT FOOT (HCC): ICD-10-CM

## 2018-05-07 DIAGNOSIS — I73.9 PAD (PERIPHERAL ARTERY DISEASE) (HCC): ICD-10-CM

## 2018-05-07 DIAGNOSIS — I70.229 CRITICAL LOWER LIMB ISCHEMIA (HCC): Primary | ICD-10-CM

## 2018-05-07 PROCEDURE — 1111F DSCHRG MED/CURRENT MED MERGE: CPT | Performed by: PODIATRIST

## 2018-05-07 PROCEDURE — 99213 OFFICE O/P EST LOW 20 MIN: CPT | Performed by: PODIATRIST

## 2018-05-07 PROCEDURE — G8427 DOCREV CUR MEDS BY ELIG CLIN: HCPCS | Performed by: PODIATRIST

## 2018-05-07 PROCEDURE — G8419 CALC BMI OUT NRM PARAM NOF/U: HCPCS | Performed by: PODIATRIST

## 2018-05-07 PROCEDURE — 4004F PT TOBACCO SCREEN RCVD TLK: CPT | Performed by: PODIATRIST

## 2018-05-08 ENCOUNTER — HOSPITAL ENCOUNTER (OUTPATIENT)
Dept: INTERVENTIONAL RADIOLOGY/VASCULAR | Age: 48
Discharge: HOME | End: 2018-05-10
Payer: COMMERCIAL

## 2018-05-08 VITALS
SYSTOLIC BLOOD PRESSURE: 151 MMHG | BODY MASS INDEX: 16.17 KG/M2 | OXYGEN SATURATION: 99 % | HEART RATE: 66 BPM | WEIGHT: 126 LBS | RESPIRATION RATE: 16 BRPM | HEIGHT: 74 IN | TEMPERATURE: 97.3 F | DIASTOLIC BLOOD PRESSURE: 96 MMHG

## 2018-05-08 DIAGNOSIS — M79.606 PAIN OF LOWER EXTREMITY, UNSPECIFIED LATERALITY: ICD-10-CM

## 2018-05-08 LAB
BUN BLDV-MCNC: 16 MG/DL (ref 6–20)
CREAT SERPL-MCNC: 1.04 MG/DL (ref 0.7–1.2)
GFR AFRICAN AMERICAN: >60 ML/MIN
GFR NON-AFRICAN AMERICAN: >60 ML/MIN
GFR SERPL CREATININE-BSD FRML MDRD: NORMAL ML/MIN/{1.73_M2}
GFR SERPL CREATININE-BSD FRML MDRD: NORMAL ML/MIN/{1.73_M2}
HCT VFR BLD CALC: 47.8 % (ref 41–53)
HEMOGLOBIN: 15.6 G/DL (ref 13.5–17.5)
INR BLD: 1.1
MCH RBC QN AUTO: 31.7 PG (ref 26–34)
MCHC RBC AUTO-ENTMCNC: 32.7 G/DL (ref 31–37)
MCV RBC AUTO: 96.9 FL (ref 80–100)
NRBC AUTOMATED: ABNORMAL PER 100 WBC
PARTIAL THROMBOPLASTIN TIME: 36 SEC (ref 23–31)
PDW BLD-RTO: 16.7 % (ref 11.5–14.5)
PLATELET # BLD: 1197 K/UL (ref 130–400)
PMV BLD AUTO: 7.1 FL (ref 6–12)
PROTHROMBIN TIME: 11.3 SEC (ref 9.7–11.6)
RBC # BLD: 4.93 M/UL (ref 4.5–5.9)
WBC # BLD: 14.5 K/UL (ref 3.5–11)

## 2018-05-08 PROCEDURE — 2580000003 HC RX 258: Performed by: SURGERY

## 2018-05-08 PROCEDURE — 99152 MOD SED SAME PHYS/QHP 5/>YRS: CPT

## 2018-05-08 PROCEDURE — 6360000002 HC RX W HCPCS

## 2018-05-08 PROCEDURE — 85027 COMPLETE CBC AUTOMATED: CPT

## 2018-05-08 PROCEDURE — 82565 ASSAY OF CREATININE: CPT

## 2018-05-08 PROCEDURE — 6360000004 HC RX CONTRAST MEDICATION: Performed by: SURGERY

## 2018-05-08 PROCEDURE — C1769 GUIDE WIRE: HCPCS

## 2018-05-08 PROCEDURE — 7100000001 HC PACU RECOVERY - ADDTL 15 MIN

## 2018-05-08 PROCEDURE — 99153 MOD SED SAME PHYS/QHP EA: CPT

## 2018-05-08 PROCEDURE — 6360000002 HC RX W HCPCS: Performed by: SURGERY

## 2018-05-08 PROCEDURE — 7100000000 HC PACU RECOVERY - FIRST 15 MIN

## 2018-05-08 PROCEDURE — 37221 HC ILIAC TERRITORY PLASTY STENT: CPT

## 2018-05-08 PROCEDURE — 85610 PROTHROMBIN TIME: CPT

## 2018-05-08 PROCEDURE — 84520 ASSAY OF UREA NITROGEN: CPT

## 2018-05-08 PROCEDURE — 37227 HC FEM/POPL REVASC STNT & ATHER: CPT

## 2018-05-08 PROCEDURE — 6370000000 HC RX 637 (ALT 250 FOR IP): Performed by: SURGERY

## 2018-05-08 PROCEDURE — 37222 HC ILIAC TERRITORY ADDL PLASTY: CPT

## 2018-05-08 PROCEDURE — 0238T TRLUML PERIP ATHRC ILIAC ART: CPT

## 2018-05-08 PROCEDURE — 85730 THROMBOPLASTIN TIME PARTIAL: CPT

## 2018-05-08 PROCEDURE — 37220 HC ILIAC TERRITORY PLASTY: CPT

## 2018-05-08 PROCEDURE — 75716 ARTERY X-RAYS ARMS/LEGS: CPT

## 2018-05-08 RX ORDER — HYDRALAZINE HYDROCHLORIDE 20 MG/ML
INJECTION INTRAMUSCULAR; INTRAVENOUS
Status: COMPLETED
Start: 2018-05-08 | End: 2018-05-08

## 2018-05-08 RX ORDER — FENTANYL CITRATE 50 UG/ML
INJECTION, SOLUTION INTRAMUSCULAR; INTRAVENOUS
Status: COMPLETED | OUTPATIENT
Start: 2018-05-08 | End: 2018-05-08

## 2018-05-08 RX ORDER — MIDAZOLAM HYDROCHLORIDE 1 MG/ML
INJECTION INTRAMUSCULAR; INTRAVENOUS
Status: COMPLETED | OUTPATIENT
Start: 2018-05-08 | End: 2018-05-08

## 2018-05-08 RX ORDER — ACETAMINOPHEN 325 MG/1
650 TABLET ORAL EVERY 4 HOURS PRN
Status: DISCONTINUED | OUTPATIENT
Start: 2018-05-08 | End: 2018-05-11 | Stop reason: HOSPADM

## 2018-05-08 RX ORDER — ALBUTEROL SULFATE 2.5 MG/3ML
SOLUTION RESPIRATORY (INHALATION)
Status: DISPENSED
Start: 2018-05-08 | End: 2018-05-09

## 2018-05-08 RX ORDER — HEPARIN SODIUM 1000 [USP'U]/ML
INJECTION, SOLUTION INTRAVENOUS; SUBCUTANEOUS
Status: COMPLETED | OUTPATIENT
Start: 2018-05-08 | End: 2018-05-08

## 2018-05-08 RX ORDER — ONDANSETRON 2 MG/ML
4 INJECTION INTRAMUSCULAR; INTRAVENOUS ONCE
Status: COMPLETED | OUTPATIENT
Start: 2018-05-08 | End: 2018-05-08

## 2018-05-08 RX ORDER — HYDRALAZINE HYDROCHLORIDE 20 MG/ML
5 INJECTION INTRAMUSCULAR; INTRAVENOUS
Status: DISCONTINUED | OUTPATIENT
Start: 2018-05-08 | End: 2018-05-11 | Stop reason: HOSPADM

## 2018-05-08 RX ORDER — CLOPIDOGREL BISULFATE 75 MG/1
75 TABLET ORAL DAILY
Status: DISCONTINUED | OUTPATIENT
Start: 2018-05-09 | End: 2018-05-11 | Stop reason: HOSPADM

## 2018-05-08 RX ORDER — CLOPIDOGREL BISULFATE 75 MG/1
300 TABLET ORAL ONCE
Status: COMPLETED | OUTPATIENT
Start: 2018-05-08 | End: 2018-05-08

## 2018-05-08 RX ORDER — SODIUM CHLORIDE 9 MG/ML
INJECTION, SOLUTION INTRAVENOUS CONTINUOUS
Status: DISCONTINUED | OUTPATIENT
Start: 2018-05-08 | End: 2018-05-11 | Stop reason: HOSPADM

## 2018-05-08 RX ORDER — DEXTROSE AND SODIUM CHLORIDE 5; .45 G/100ML; G/100ML
INJECTION, SOLUTION INTRAVENOUS CONTINUOUS
Status: DISCONTINUED | OUTPATIENT
Start: 2018-05-08 | End: 2018-05-11 | Stop reason: HOSPADM

## 2018-05-08 RX ORDER — IODIXANOL 320 MG/ML
100 INJECTION, SOLUTION INTRAVASCULAR
Status: COMPLETED | OUTPATIENT
Start: 2018-05-08 | End: 2018-05-08

## 2018-05-08 RX ORDER — ALBUTEROL SULFATE 2.5 MG/3ML
2.5 SOLUTION RESPIRATORY (INHALATION) ONCE
Status: COMPLETED | OUTPATIENT
Start: 2018-05-08 | End: 2018-05-08

## 2018-05-08 RX ADMIN — HYDRALAZINE HYDROCHLORIDE 5 MG: 20 INJECTION INTRAMUSCULAR; INTRAVENOUS at 18:49

## 2018-05-08 RX ADMIN — FENTANYL CITRATE 25 MCG: 50 INJECTION, SOLUTION INTRAMUSCULAR; INTRAVENOUS at 15:35

## 2018-05-08 RX ADMIN — IODIXANOL 100 ML: 320 INJECTION, SOLUTION INTRAVASCULAR at 15:43

## 2018-05-08 RX ADMIN — MIDAZOLAM HYDROCHLORIDE 0.5 MG: 1 INJECTION, SOLUTION INTRAMUSCULAR; INTRAVENOUS at 15:35

## 2018-05-08 RX ADMIN — FENTANYL CITRATE 25 MCG: 50 INJECTION, SOLUTION INTRAMUSCULAR; INTRAVENOUS at 15:28

## 2018-05-08 RX ADMIN — MIDAZOLAM HYDROCHLORIDE 0.5 MG: 1 INJECTION, SOLUTION INTRAMUSCULAR; INTRAVENOUS at 15:28

## 2018-05-08 RX ADMIN — ONDANSETRON 4 MG: 2 INJECTION INTRAMUSCULAR; INTRAVENOUS at 20:04

## 2018-05-08 RX ADMIN — FENTANYL CITRATE 50 MCG: 50 INJECTION, SOLUTION INTRAMUSCULAR; INTRAVENOUS at 15:45

## 2018-05-08 RX ADMIN — HYDRALAZINE HYDROCHLORIDE 5 MG: 20 INJECTION INTRAMUSCULAR; INTRAVENOUS at 17:45

## 2018-05-08 RX ADMIN — ALBUTEROL SULFATE 2.5 MG: 2.5 SOLUTION RESPIRATORY (INHALATION) at 18:10

## 2018-05-08 RX ADMIN — DEXTROSE AND SODIUM CHLORIDE: 5; 450 INJECTION, SOLUTION INTRAVENOUS at 13:28

## 2018-05-08 RX ADMIN — MIDAZOLAM HYDROCHLORIDE 1 MG: 1 INJECTION, SOLUTION INTRAMUSCULAR; INTRAVENOUS at 15:16

## 2018-05-08 RX ADMIN — SODIUM CHLORIDE: 9 INJECTION, SOLUTION INTRAVENOUS at 17:26

## 2018-05-08 RX ADMIN — CLOPIDOGREL BISULFATE 300 MG: 75 TABLET ORAL at 20:50

## 2018-05-08 RX ADMIN — FENTANYL CITRATE 50 MCG: 50 INJECTION, SOLUTION INTRAMUSCULAR; INTRAVENOUS at 15:16

## 2018-05-08 RX ADMIN — FENTANYL CITRATE 50 MCG: 50 INJECTION, SOLUTION INTRAMUSCULAR; INTRAVENOUS at 16:19

## 2018-05-08 RX ADMIN — HEPARIN SODIUM 5000 UNITS: 1000 INJECTION, SOLUTION INTRAVENOUS; SUBCUTANEOUS at 15:20

## 2018-05-08 ASSESSMENT — PAIN DESCRIPTION - ORIENTATION
ORIENTATION: RIGHT

## 2018-05-08 ASSESSMENT — PAIN - FUNCTIONAL ASSESSMENT: PAIN_FUNCTIONAL_ASSESSMENT: 0-10

## 2018-05-08 ASSESSMENT — PAIN SCALES - GENERAL
PAINLEVEL_OUTOF10: 6
PAINLEVEL_OUTOF10: 4
PAINLEVEL_OUTOF10: 4
PAINLEVEL_OUTOF10: 2

## 2018-05-08 ASSESSMENT — PAIN DESCRIPTION - LOCATION
LOCATION: GROIN
LOCATION: CHEST;FINGER (COMMENT WHICH ONE)
LOCATION: GROIN
LOCATION: GROIN

## 2018-05-08 ASSESSMENT — PAIN DESCRIPTION - DESCRIPTORS: DESCRIPTORS: CONSTANT

## 2018-05-15 DIAGNOSIS — I99.8 ISCHEMIA OF TOE: Primary | ICD-10-CM

## 2018-05-15 RX ORDER — SULFAMETHOXAZOLE AND TRIMETHOPRIM 800; 160 MG/1; MG/1
1 TABLET ORAL 2 TIMES DAILY
Qty: 20 TABLET | Refills: 0 | Status: SHIPPED | OUTPATIENT
Start: 2018-05-15 | End: 2018-05-25

## 2018-05-18 ENCOUNTER — OFFICE VISIT (OUTPATIENT)
Dept: INTERNAL MEDICINE | Age: 48
End: 2018-05-18
Payer: COMMERCIAL

## 2018-05-18 VITALS
HEART RATE: 65 BPM | HEIGHT: 74 IN | DIASTOLIC BLOOD PRESSURE: 81 MMHG | WEIGHT: 124 LBS | SYSTOLIC BLOOD PRESSURE: 120 MMHG | BODY MASS INDEX: 15.92 KG/M2

## 2018-05-18 DIAGNOSIS — I10 ESSENTIAL HYPERTENSION: Primary | ICD-10-CM

## 2018-05-18 DIAGNOSIS — F17.200 SMOKING: ICD-10-CM

## 2018-05-18 DIAGNOSIS — I73.9 PVD (PERIPHERAL VASCULAR DISEASE) (HCC): ICD-10-CM

## 2018-05-18 DIAGNOSIS — Z23 NEED FOR VACCINATION FOR STREP PNEUMONIAE: ICD-10-CM
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
## 2018-05-18 PROCEDURE — 4004F PT TOBACCO SCREEN RCVD TLK: CPT | Performed by: INTERNAL MEDICINE

## 2018-05-18 PROCEDURE — 1111F DSCHRG MED/CURRENT MED MERGE: CPT | Performed by: INTERNAL MEDICINE

## 2018-05-18 PROCEDURE — G8427 DOCREV CUR MEDS BY ELIG CLIN: HCPCS | Performed by: INTERNAL MEDICINE

## 2018-05-18 PROCEDURE — 90471 IMMUNIZATION ADMIN: CPT | Performed by: INTERNAL MEDICINE

## 2018-05-18 PROCEDURE — 99214 OFFICE O/P EST MOD 30 MIN: CPT | Performed by: INTERNAL MEDICINE

## 2018-05-18 PROCEDURE — G8419 CALC BMI OUT NRM PARAM NOF/U: HCPCS | Performed by: INTERNAL MEDICINE

## 2018-05-18 PROCEDURE — 90732 PPSV23 VACC 2 YRS+ SUBQ/IM: CPT | Performed by: INTERNAL MEDICINE

## 2018-05-18 PROCEDURE — 99213 OFFICE O/P EST LOW 20 MIN: CPT | Performed by: INTERNAL MEDICINE

## 2018-05-18 RX ORDER — VARENICLINE TARTRATE 1 MG/1
1 TABLET, FILM COATED ORAL 2 TIMES DAILY
Qty: 60 TABLET | Refills: 5 | Status: SHIPPED | OUTPATIENT
Start: 2018-05-18 | End: 2019-04-02 | Stop reason: ALTCHOICE

## 2018-05-18 RX ORDER — VARENICLINE TARTRATE 25 MG
KIT ORAL
Qty: 1 EACH | Refills: 0 | Status: SHIPPED | OUTPATIENT
Start: 2018-05-18 | End: 2018-06-05 | Stop reason: ALTCHOICE

## 2018-05-18 ASSESSMENT — PATIENT HEALTH QUESTIONNAIRE - PHQ9
2. FEELING DOWN, DEPRESSED OR HOPELESS: 0
SUM OF ALL RESPONSES TO PHQ QUESTIONS 1-9: 0
1. LITTLE INTEREST OR PLEASURE IN DOING THINGS: 0
SUM OF ALL RESPONSES TO PHQ9 QUESTIONS 1 & 2: 0

## 2018-06-05 ENCOUNTER — HOSPITAL ENCOUNTER (OUTPATIENT)
Dept: INTERVENTIONAL RADIOLOGY/VASCULAR | Age: 48
Discharge: HOME | End: 2018-06-07
Payer: COMMERCIAL

## 2018-06-05 VITALS
TEMPERATURE: 98.1 F | DIASTOLIC BLOOD PRESSURE: 95 MMHG | SYSTOLIC BLOOD PRESSURE: 165 MMHG | BODY MASS INDEX: 16.43 KG/M2 | HEIGHT: 74 IN | RESPIRATION RATE: 14 BRPM | HEART RATE: 65 BPM | OXYGEN SATURATION: 99 % | WEIGHT: 128 LBS

## 2018-06-05 DIAGNOSIS — Z87.448: ICD-10-CM

## 2018-06-05 LAB
ABSOLUTE EOS #: 0.7 K/UL (ref 0–0.4)
ABSOLUTE IMMATURE GRANULOCYTE: ABNORMAL K/UL (ref 0–0.3)
ABSOLUTE LYMPH #: 2.2 K/UL (ref 1–4.8)
ABSOLUTE MONO #: 1 K/UL (ref 0.2–0.8)
BASOPHILS # BLD: 2 % (ref 0–2)
BASOPHILS ABSOLUTE: 0.2 K/UL (ref 0–0.2)
BUN BLDV-MCNC: 18 MG/DL (ref 6–20)
CREAT SERPL-MCNC: 1.08 MG/DL (ref 0.7–1.2)
DIFFERENTIAL TYPE: ABNORMAL
EOSINOPHILS RELATIVE PERCENT: 5 % (ref 1–4)
GFR AFRICAN AMERICAN: >60 ML/MIN
GFR NON-AFRICAN AMERICAN: >60 ML/MIN
GFR SERPL CREATININE-BSD FRML MDRD: NORMAL ML/MIN/{1.73_M2}
GFR SERPL CREATININE-BSD FRML MDRD: NORMAL ML/MIN/{1.73_M2}
HCT VFR BLD CALC: 40 % (ref 41–53)
HEMOGLOBIN: 13.4 G/DL (ref 13.5–17.5)
IMMATURE GRANULOCYTES: ABNORMAL %
INR BLD: 1.1
LYMPHOCYTES # BLD: 17 % (ref 24–44)
MCH RBC QN AUTO: 32.2 PG (ref 26–34)
MCHC RBC AUTO-ENTMCNC: 33.6 G/DL (ref 31–37)
MCV RBC AUTO: 95.8 FL (ref 80–100)
MONOCYTES # BLD: 8 % (ref 1–7)
NRBC AUTOMATED: ABNORMAL PER 100 WBC
PARTIAL THROMBOPLASTIN TIME: 27.6 SEC (ref 23–31)
PDW BLD-RTO: 16.3 % (ref 11.5–14.5)
PLATELET # BLD: 691 K/UL (ref 130–400)
PLATELET ESTIMATE: ABNORMAL
PMV BLD AUTO: 6.9 FL (ref 6–12)
PROTHROMBIN TIME: 11 SEC (ref 9.7–11.6)
RBC # BLD: 4.18 M/UL (ref 4.5–5.9)
RBC # BLD: ABNORMAL 10*6/UL
SEG NEUTROPHILS: 68 % (ref 36–66)
SEGMENTED NEUTROPHILS ABSOLUTE COUNT: 8.8 K/UL (ref 1.8–7.7)
WBC # BLD: 12.9 K/UL (ref 3.5–11)
WBC # BLD: ABNORMAL 10*3/UL

## 2018-06-05 PROCEDURE — 7100000010 HC PHASE II RECOVERY - FIRST 15 MIN

## 2018-06-05 PROCEDURE — 6360000002 HC RX W HCPCS: Performed by: SURGERY

## 2018-06-05 PROCEDURE — 75625 CONTRAST EXAM ABDOMINL AORTA: CPT

## 2018-06-05 PROCEDURE — 36245 INS CATH ABD/L-EXT ART 1ST: CPT

## 2018-06-05 PROCEDURE — 85610 PROTHROMBIN TIME: CPT

## 2018-06-05 PROCEDURE — 99152 MOD SED SAME PHYS/QHP 5/>YRS: CPT

## 2018-06-05 PROCEDURE — 99153 MOD SED SAME PHYS/QHP EA: CPT

## 2018-06-05 PROCEDURE — 2580000003 HC RX 258: Performed by: SURGERY

## 2018-06-05 PROCEDURE — 85730 THROMBOPLASTIN TIME PARTIAL: CPT

## 2018-06-05 PROCEDURE — 6360000004 HC RX CONTRAST MEDICATION: Performed by: SURGERY

## 2018-06-05 PROCEDURE — 84520 ASSAY OF UREA NITROGEN: CPT

## 2018-06-05 PROCEDURE — C1894 INTRO/SHEATH, NON-LASER: HCPCS

## 2018-06-05 PROCEDURE — 36415 COLL VENOUS BLD VENIPUNCTURE: CPT

## 2018-06-05 PROCEDURE — 85025 COMPLETE CBC W/AUTO DIFF WBC: CPT

## 2018-06-05 PROCEDURE — 82565 ASSAY OF CREATININE: CPT

## 2018-06-05 PROCEDURE — 75716 ARTERY X-RAYS ARMS/LEGS: CPT

## 2018-06-05 PROCEDURE — 7100000011 HC PHASE II RECOVERY - ADDTL 15 MIN

## 2018-06-05 RX ORDER — ACETAMINOPHEN 325 MG/1
650 TABLET ORAL EVERY 4 HOURS PRN
Status: DISCONTINUED | OUTPATIENT
Start: 2018-06-05 | End: 2018-06-08 | Stop reason: HOSPADM

## 2018-06-05 RX ORDER — MIDAZOLAM HYDROCHLORIDE 1 MG/ML
INJECTION INTRAMUSCULAR; INTRAVENOUS
Status: COMPLETED | OUTPATIENT
Start: 2018-06-05 | End: 2018-06-05

## 2018-06-05 RX ORDER — FENTANYL CITRATE 50 UG/ML
INJECTION, SOLUTION INTRAMUSCULAR; INTRAVENOUS
Status: COMPLETED | OUTPATIENT
Start: 2018-06-05 | End: 2018-06-05

## 2018-06-05 RX ORDER — IODIXANOL 320 MG/ML
100 INJECTION, SOLUTION INTRAVASCULAR
Status: COMPLETED | OUTPATIENT
Start: 2018-06-05 | End: 2018-06-05

## 2018-06-05 RX ORDER — SODIUM CHLORIDE 9 MG/ML
INJECTION, SOLUTION INTRAVENOUS CONTINUOUS
Status: DISCONTINUED | OUTPATIENT
Start: 2018-06-05 | End: 2018-06-08 | Stop reason: HOSPADM

## 2018-06-05 RX ORDER — DEXTROSE AND SODIUM CHLORIDE 5; .45 G/100ML; G/100ML
INJECTION, SOLUTION INTRAVENOUS CONTINUOUS
Status: DISCONTINUED | OUTPATIENT
Start: 2018-06-05 | End: 2018-06-08 | Stop reason: HOSPADM

## 2018-06-05 RX ADMIN — IODIXANOL 100 ML: 320 INJECTION, SOLUTION INTRAVASCULAR at 09:00

## 2018-06-05 RX ADMIN — MIDAZOLAM HYDROCHLORIDE 1 MG: 1 INJECTION, SOLUTION INTRAMUSCULAR; INTRAVENOUS at 11:18

## 2018-06-05 RX ADMIN — MIDAZOLAM HYDROCHLORIDE 1 MG: 1 INJECTION, SOLUTION INTRAMUSCULAR; INTRAVENOUS at 11:20

## 2018-06-05 RX ADMIN — DEXTROSE AND SODIUM CHLORIDE: 5; 450 INJECTION, SOLUTION INTRAVENOUS at 10:52

## 2018-06-05 RX ADMIN — FENTANYL CITRATE 50 MCG: 50 INJECTION, SOLUTION INTRAMUSCULAR; INTRAVENOUS at 11:20

## 2018-06-05 RX ADMIN — FENTANYL CITRATE 50 MCG: 50 INJECTION, SOLUTION INTRAMUSCULAR; INTRAVENOUS at 11:18

## 2018-06-05 ASSESSMENT — PAIN SCALES - GENERAL
PAINLEVEL_OUTOF10: 0
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
## 2018-07-09 DIAGNOSIS — I10 ESSENTIAL HYPERTENSION: ICD-10-CM

## 2018-07-09 DIAGNOSIS — I73.9 PVD (PERIPHERAL VASCULAR DISEASE) (HCC): ICD-10-CM

## 2018-07-09 RX ORDER — AMLODIPINE BESYLATE 5 MG/1
5 TABLET ORAL DAILY
Qty: 30 TABLET | Refills: 2 | Status: SHIPPED | OUTPATIENT
Start: 2018-07-09 | End: 2018-09-14 | Stop reason: SDUPTHER

## 2018-07-09 RX ORDER — ATORVASTATIN CALCIUM 40 MG/1
40 TABLET, FILM COATED ORAL DAILY
Qty: 30 TABLET | Refills: 2 | Status: SHIPPED | OUTPATIENT
Start: 2018-07-09 | End: 2018-09-14 | Stop reason: SDUPTHER

## 2018-07-23 ENCOUNTER — TELEPHONE (OUTPATIENT)
Dept: INTERNAL MEDICINE | Age: 48
End: 2018-07-23

## 2018-07-23 NOTE — TELEPHONE ENCOUNTER
Pt's wife called Patsy Torres stated pt need refill on his Metoprolol Tartrate 25mg taking one twice daily please send to the [facility] on [address] pt received med at the ER Visit at [facility] on 5/9/2018 pt has appt with you on 9/14/2018    Health Maintenance   Topic Date Due    HIV screen  08/10/1984    Flu vaccine (1) 09/01/2018    Potassium monitoring  04/30/2019    Creatinine monitoring  06/05/2019    Lipid screen  05/02/2023    DTaP/Tdap/Td vaccine (2 - Td) 05/09/2028    Pneumococcal med risk  Completed             (applicable per patient's age: Cancer Screenings, Depression Screening, Fall Risk Screening, Immunizations)    Hemoglobin A1C (%)   Date Value   05/02/2018 4.8     LDL Cholesterol (mg/dL)   Date Value   05/02/2018 41     BUN (mg/dL)   Date Value   06/05/2018 18      (goal A1C is < 7)   (goal LDL is <100) need 30-50% reduction from baseline     BP Readings from Last 3 Encounters:   06/05/18 (!) 165/95   05/18/18 120/81   05/08/18 (!) 151/96    (goal /80)      All Future Testing planned in [facility]:  Lab Frequency Next Occurrence   Lipid Panel Once 07/29/2018   Comprehensive Metabolic Panel Once 78/14/4538   Hemoglobin A1C Once 07/19/2018   Lipid Panel Once 09/17/2018   Microalbumin, Ur Once 09/17/2018   TSH with Reflex Once 09/17/2018   VL ARTERIAL PVR LOWER W EXERCISE Once 09/23/2018       Next Visit Date:  Future Appointments  Date Time Provider Ezequiel Booker   9/14/2018 10:15 AM Logan Mcghee MD [id number]            Patient Active Problem List:     Essential hypertension     PVD (peripheral vascular disease) ([facility] [address]     Stage 3 chronic kidney disease     Closed nondisplaced fracture of third metatarsal bone of left foot     Ischemia of toe     Critical lower limb ischemia     Gangrene of toe of left foot (HCC)     Bandemia     Thrombocytosis (HCC)     Moderate malnutrition (HCC)     Pain of lower extremity     Smoking

## 2018-08-08 ENCOUNTER — OFFICE VISIT (OUTPATIENT)
Dept: INTERNAL MEDICINE | Age: 48
End: 2018-08-08
Payer: COMMERCIAL

## 2018-08-08 VITALS
SYSTOLIC BLOOD PRESSURE: 139 MMHG | OXYGEN SATURATION: 94 % | DIASTOLIC BLOOD PRESSURE: 86 MMHG | WEIGHT: 135 LBS | HEART RATE: 64 BPM | BODY MASS INDEX: 17.33 KG/M2

## 2018-08-08 DIAGNOSIS — Z87.891 FORMER SMOKER: ICD-10-CM

## 2018-08-08 DIAGNOSIS — I10 ESSENTIAL HYPERTENSION: ICD-10-CM

## 2018-08-08 DIAGNOSIS — J34.89 RHINORRHEA: ICD-10-CM

## 2018-08-08 DIAGNOSIS — R09.1 PLEURISY: Primary | ICD-10-CM

## 2018-08-08 DIAGNOSIS — I73.9 PVD (PERIPHERAL VASCULAR DISEASE) (HCC): ICD-10-CM

## 2018-08-08 DIAGNOSIS — L08.9 LEFT FOOT INFECTION: ICD-10-CM

## 2018-08-08 DIAGNOSIS — J44.9 CHRONIC OBSTRUCTIVE PULMONARY DISEASE, UNSPECIFIED COPD TYPE (HCC): ICD-10-CM

## 2018-08-08 LAB — HBA1C MFR BLD: 4.9 %

## 2018-08-08 PROCEDURE — 83036 HEMOGLOBIN GLYCOSYLATED A1C: CPT | Performed by: STUDENT IN AN ORGANIZED HEALTH CARE EDUCATION/TRAINING PROGRAM

## 2018-08-08 PROCEDURE — G8926 SPIRO NO PERF OR DOC: HCPCS | Performed by: STUDENT IN AN ORGANIZED HEALTH CARE EDUCATION/TRAINING PROGRAM

## 2018-08-08 PROCEDURE — G8427 DOCREV CUR MEDS BY ELIG CLIN: HCPCS | Performed by: STUDENT IN AN ORGANIZED HEALTH CARE EDUCATION/TRAINING PROGRAM

## 2018-08-08 PROCEDURE — 1036F TOBACCO NON-USER: CPT | Performed by: STUDENT IN AN ORGANIZED HEALTH CARE EDUCATION/TRAINING PROGRAM

## 2018-08-08 PROCEDURE — 99213 OFFICE O/P EST LOW 20 MIN: CPT | Performed by: STUDENT IN AN ORGANIZED HEALTH CARE EDUCATION/TRAINING PROGRAM

## 2018-08-08 PROCEDURE — 3023F SPIROM DOC REV: CPT | Performed by: STUDENT IN AN ORGANIZED HEALTH CARE EDUCATION/TRAINING PROGRAM

## 2018-08-08 PROCEDURE — 99213 OFFICE O/P EST LOW 20 MIN: CPT | Performed by: INTERNAL MEDICINE

## 2018-08-08 PROCEDURE — G8419 CALC BMI OUT NRM PARAM NOF/U: HCPCS | Performed by: STUDENT IN AN ORGANIZED HEALTH CARE EDUCATION/TRAINING PROGRAM

## 2018-08-08 RX ORDER — ECHINACEA PURPUREA EXTRACT 125 MG
1 TABLET ORAL PRN
Qty: 1 BOTTLE | Refills: 3 | Status: SHIPPED | OUTPATIENT
Start: 2018-08-08 | End: 2019-09-20 | Stop reason: SDUPTHER

## 2018-08-08 RX ORDER — DOXYCYCLINE HYCLATE 100 MG
100 TABLET ORAL 2 TIMES DAILY
Qty: 20 TABLET | Refills: 0 | Status: SHIPPED | OUTPATIENT
Start: 2018-08-08 | End: 2018-08-18

## 2018-08-08 RX ORDER — ALBUTEROL SULFATE 90 UG/1
2 AEROSOL, METERED RESPIRATORY (INHALATION) EVERY 6 HOURS PRN
Qty: 1 INHALER | Refills: 3 | Status: SHIPPED | OUTPATIENT
Start: 2018-08-08 | End: 2018-09-14 | Stop reason: SDUPTHER

## 2018-08-08 NOTE — PROGRESS NOTES
HYPERTENSION visit     BP Readings from Last 3 Encounters:   06/05/18 (!) 165/95   05/18/18 120/81   05/08/18 (!) 151/96       LDL Cholesterol (mg/dL)   Date Value   05/02/2018 41     HDL (mg/dL)   Date Value   05/02/2018 30 (L)     BUN (mg/dL)   Date Value   06/05/2018 18     CREATININE (mg/dL)   Date Value   06/05/2018 1.08     Glucose (mg/dL)   Date Value   04/30/2018 81              Have you changed or started any medications since your last visit including any over-the-counter medicines, vitamins, or herbal medicines? no   Have you stopped taking any of your medications? Is so, why? -  no  Are you having any side effects from any of your medications? - no  How often do you miss doses of your medication? rare      Have you seen any other physician or provider since your last visit?  no   Have you had any other diagnostic tests since your last visit?  no   Have you been seen in the emergency room and/or had an admission in a hospital since we last saw you?  no   Have you had your routine dental cleaning in the past 6 months?  no     Do you have an active [employer] account? If no, what is the barrier?   Yes    Patient Care Team:  Princess Lynch MD as PCP - General (Internal Medicine)    Medical History Review  Past Medical, Family, and Social History reviewed and does contribute to the patient presenting condition    Health Maintenance   Topic Date Due    HIV screen  08/10/1984    Flu vaccine (1) 09/01/2018    Potassium monitoring  04/30/2019    Creatinine monitoring  06/05/2019    Lipid screen  05/02/2023    DTaP/Tdap/Td vaccine (2 - Td) 05/09/2028    Pneumococcal med risk  Completed

## 2018-08-08 NOTE — PROGRESS NOTES
daily 60 tablet 5    clopidogrel (PLAVIX) 75 MG tablet Take 1 tablet by mouth daily 90 tablet 1    Gauze Pads & Dressings (KERLIX GAUZE ROLL SMALL) MISC Apply to foot 96 each 1    traMADol (ULTRAM) 50 MG tablet Take 1 tablet by mouth every 6 hours as needed for Pain for up to 7 days. . 28 tablet 0    albuterol sulfate HFA (PROVENTIL HFA) 108 (90 Base) MCG/ACT inhaler Inhale 2 puffs into the lungs every 6 hours as needed for Wheezing 1 Inhaler 0     No current facility-administered medications on file prior to visit. SOCIAL HISTORY    Reviewed and no change from previous record. Christen Baker  reports that he quit smoking about 2 months ago. His smoking use included Cigars. He has a 35.00 pack-year smoking history.  He has never used smokeless tobacco.    FAMILY HISTORY:    Reviewed and No change from previous visit    REVIEW OF SYSTEMS:    ENT: negative  Respiratory: no cough, shortness of breath, or wheezing  Cardiovascular: no chest pain or dyspnea on exertion  Gastrointestinal: no abdominal pain, black or bloody stools  Neurological: no TIA or stroke symptoms  Endocrine: negative  Genito-Urinary: no dysuria, trouble voiding, or hematuria  Musculoskeletal: negative  Dermatological: negative    PHYSICAL EXAM:      Vitals:    08/08/18 1342   BP: 139/86   Pulse: 64   SpO2: 94%     General appearance - alert, well appearing, and in no distress  Chest - clear to auscultation, no wheezes, rales or rhonchi, symmetric air entry  Abdomen - soft, nontender, nondistended, no masses or organomegaly  Back exam - full range of motion, no tenderness, palpable spasm or pain on motion  Neurological - alert, oriented, normal speech, no focal findings or movement disorder noted  Musculoskeletal - no joint tenderness, deformity or swelling  Extremities - Small ulcer on left foot present     LABORATORY FINDINGS:    CBC:  Lab Results   Component Value Date    WBC 12.9 06/05/2018    HGB 13.4 06/05/2018     06/05/2018     BMP: Lab Results   Component Value Date     04/30/2018    K 4.4 04/30/2018     04/30/2018    CO2 23 04/30/2018    BUN 18 06/05/2018    CREATININE 1.08 06/05/2018    GLUCOSE 81 04/30/2018     HEMOGLOBIN A1C:   Lab Results   Component Value Date    LABA1C 4.8 05/02/2018     MICROALBUMIN URINE: No results found for: MICROALBUR  FASTING LIPID PANEL:  Lab Results   Component Value Date    CHOL 89 05/02/2018    HDL 30 (L) 05/02/2018    TRIG 91 05/02/2018     LIVER PROFILE:No results found for: ALT, AST, PROT, BILITOT, BILIDIR, LABALBU   THYROID FUNCTION: No results found for: TSH   URINE ANALYSIS: No results found for: LABURIN  ASSESSMENT AND PLAN:    1. Chest  pain likely related to pleurisy. 2.  Rhinorrhea:   Normal saline spray    1. PVD (peripheral vascular disease) ([address]  Had stenting in the right lower extremity. Patient is on aspirin and Plavix. 2. Essential hypertension  Norvasc and metoprolol. Will controlled. 3. Former smoker  - On chantix     4. Chronic obstructive pulmonary disease, unspecified COPD type (HCC)  Albuterol inhaler. Dulera and Respimat    5. Left foot infection  - Doxycycline 100 mg twice a day for 10 days. \  - Referral  to podiatry. - POCT glycosylated hemoglobin (Hb A1C)      FOLLOW UP:       1. Ahmet Hands received counseling on the following healthy behaviors: nutrition, exercise and medication adherence  2. Patient given educational materials - see patient instructions  3. Discussed use, benefit, and side effects of prescribed medications. Barriers to medication compliance addressed. All patient questions answered. Pt voiced understanding. 4.  Reviewed prior labs and health maintenance  5. Continue current medications, diet and exercise.       Alberto Traylor MD  Internal Medicine [id number], [facility]  PGY-2  Attending Physician Statement  I have discussed the care of Tin Mauricio, including pertinent history and exam findings, with the resident. I have reviewed the key elements of all parts of the encounter with the resident. I agree with the assessment, plan and orders as documented by the resident. (GE Modifier)    Hx PVD, sp lower extr arterial stenting in [facility]. Dry cough with mild pleuritic chest pain. Ran out of MDI's--will resume. Localized cellulitis of to---Will give doxycycline course and refer to podiatry.

## 2018-08-24 ENCOUNTER — OFFICE VISIT (OUTPATIENT)
Dept: INTERNAL MEDICINE | Age: 48
End: 2018-08-24
Payer: COMMERCIAL

## 2018-08-24 VITALS
SYSTOLIC BLOOD PRESSURE: 139 MMHG | HEART RATE: 69 BPM | HEIGHT: 74 IN | WEIGHT: 141 LBS | DIASTOLIC BLOOD PRESSURE: 81 MMHG | BODY MASS INDEX: 18.1 KG/M2

## 2018-08-24 DIAGNOSIS — L97.912 ULCER OF RIGHT LOWER EXTREMITY WITH FAT LAYER EXPOSED (HCC): Primary | ICD-10-CM

## 2018-08-24 PROCEDURE — 1036F TOBACCO NON-USER: CPT | Performed by: STUDENT IN AN ORGANIZED HEALTH CARE EDUCATION/TRAINING PROGRAM

## 2018-08-24 PROCEDURE — G8419 CALC BMI OUT NRM PARAM NOF/U: HCPCS | Performed by: STUDENT IN AN ORGANIZED HEALTH CARE EDUCATION/TRAINING PROGRAM

## 2018-08-24 PROCEDURE — 99213 OFFICE O/P EST LOW 20 MIN: CPT | Performed by: STUDENT IN AN ORGANIZED HEALTH CARE EDUCATION/TRAINING PROGRAM

## 2018-08-24 PROCEDURE — G8427 DOCREV CUR MEDS BY ELIG CLIN: HCPCS | Performed by: STUDENT IN AN ORGANIZED HEALTH CARE EDUCATION/TRAINING PROGRAM

## 2018-08-24 PROCEDURE — 99214 OFFICE O/P EST MOD 30 MIN: CPT | Performed by: INTERNAL MEDICINE

## 2018-08-24 RX ORDER — NEOMYCIN SULFATE, POLYMYXIN B SULFATE AND BACITRACIN ZINC 3.5; 10000; 4 MG/G; [USP'U]/G; [USP'U]/G
OINTMENT OPHTHALMIC
Qty: 1 TUBE | Refills: 0 | Status: SHIPPED | OUTPATIENT
Start: 2018-08-24 | End: 2018-09-03

## 2018-08-24 RX ORDER — GAUZE BANDAGE 2.25"X108"
BANDAGE TOPICAL
Qty: 96 EACH | Refills: 1 | Status: SHIPPED | OUTPATIENT
Start: 2018-08-24 | End: 2019-06-12 | Stop reason: ALTCHOICE

## 2018-08-24 RX ORDER — DOXYCYCLINE HYCLATE 100 MG
100 TABLET ORAL 2 TIMES DAILY
Qty: 14 TABLET | Refills: 0 | Status: SHIPPED | OUTPATIENT
Start: 2018-08-24 | End: 2018-08-31

## 2018-08-24 ASSESSMENT — ENCOUNTER SYMPTOMS
HEMOPTYSIS: 0
BACK PAIN: 0
SORE THROAT: 0
EYE DISCHARGE: 0
ABDOMINAL PAIN: 0
NAUSEA: 0
ORTHOPNEA: 0
COUGH: 0
BLURRED VISION: 0
EYE PAIN: 0
SPUTUM PRODUCTION: 0
VOMITING: 0

## 2018-08-24 NOTE — PROGRESS NOTES
third metatarsal bone of left foot    Ischemia of toe    Critical lower limb ischemia    Gangrene of toe of left foot (HCC)    Bandemia    Thrombocytosis (HCC)    Moderate malnutrition (HCC)    Pain of lower extremity    Smoking       HISTORY OF PRESENT ILLNESS:    History was obtained from the patient. Please see above  Patient's medications, allergies, past medical, surgical, social and family histories were reviewed and updated as appropriate. Patient's allergies, medications, past medical, surgical, social and family histories were reviewed and updated as appropriate. ALLERGIES    No Known Allergies      MEDICATIONS:      Current Outpatient Prescriptions   Medication Sig Dispense Refill    Gauze Pads & Dressings (KERLIX GAUZE ROLL SMALL) MISC Apply to foot 96 each 1    doxycycline hyclate (VIBRA-TABS) 100 MG tablet Take 1 tablet by mouth 2 times daily for 7 days 14 tablet 0    neomycin-bacitracin-polymyxin (NEOSPORIN) [id number] ophthalmic ointment 4 times daily.  1 Tube 0    albuterol sulfate HFA (PROAIR HFA) 108 (90 Base) MCG/ACT inhaler Inhale 2 puffs into the lungs every 6 hours as needed for Wheezing 1 Inhaler 3    albuterol-ipratropium (COMBIVENT RESPIMAT)  MCG/ACT AERS inhaler Inhale 1 puff into the lungs every 6 hours 3 puff 2    mometasone-formoterol (DULERA) 200-5 MCG/ACT inhaler Inhale 2 puffs into the lungs every 12 hours 3 Inhaler 2    sodium chloride (OCEAN NASAL SPRAY) 0.65 % nasal spray 1 spray by Nasal route as needed for Congestion 1 Bottle 3    metoprolol tartrate (LOPRESSOR) 25 MG tablet Take 1 tablet by mouth 2 times daily 60 tablet 2    amLODIPine (NORVASC) 5 MG tablet TAKE 1 TABLET BY MOUTH DAILY 30 tablet 2    atorvastatin (LIPITOR) 40 MG tablet TAKE 1 TABLET BY MOUTH DAILY 30 tablet 2    aspirin 81 MG tablet Take 81 mg by mouth daily      varenicline (CHANTIX CONTINUING MONTH DHRUV) 1 MG tablet Take 1 tablet by mouth 2 times daily 60 tablet 5    clopidogrel

## 2018-08-24 NOTE — PATIENT INSTRUCTIONS
Follow-up appointment scheduled for 9/14/18, AVS given to patient. [facility] will see patient on 8/27/18 at 1:45 pm.    Referral for [id number] [facility] sent to [phone] (fax)  they will call pt for appt, copy of referral with number and address given to pt. Pt should call referral number if not heard from within a couple of weeks.     Oumar Gerard

## 2018-08-29 ENCOUNTER — OFFICE VISIT (OUTPATIENT)
Dept: PODIATRY | Age: 48
End: 2018-08-29
Payer: COMMERCIAL

## 2018-08-29 VITALS
SYSTOLIC BLOOD PRESSURE: 134 MMHG | HEART RATE: 63 BPM | DIASTOLIC BLOOD PRESSURE: 93 MMHG | BODY MASS INDEX: 18.13 KG/M2 | HEIGHT: 73 IN | WEIGHT: 136.8 LBS

## 2018-08-29 DIAGNOSIS — L97.812 ULCER OF RIGHT PRETIBIAL REGION, WITH FAT LAYER EXPOSED (HCC): ICD-10-CM

## 2018-08-29 DIAGNOSIS — G62.9 PERIPHERAL POLYNEUROPATHY: ICD-10-CM

## 2018-08-29 DIAGNOSIS — Z98.62 HISTORY OF ANGIOPLASTY: ICD-10-CM

## 2018-08-29 DIAGNOSIS — I73.9 PERIPHERAL ARTERIAL DISEASE (HCC): Primary | ICD-10-CM

## 2018-08-29 DIAGNOSIS — I77.1 ARTERIAL INSUFFICIENCY WITH ISCHEMIC ULCER (HCC): ICD-10-CM

## 2018-08-29 DIAGNOSIS — L98.499 ARTERIAL INSUFFICIENCY WITH ISCHEMIC ULCER (HCC): ICD-10-CM

## 2018-08-29 PROCEDURE — 99213 OFFICE O/P EST LOW 20 MIN: CPT | Performed by: STUDENT IN AN ORGANIZED HEALTH CARE EDUCATION/TRAINING PROGRAM

## 2018-08-29 PROCEDURE — 11042 DBRDMT SUBQ TIS 1ST 20SQCM/<: CPT | Performed by: STUDENT IN AN ORGANIZED HEALTH CARE EDUCATION/TRAINING PROGRAM

## 2018-08-29 PROCEDURE — G8419 CALC BMI OUT NRM PARAM NOF/U: HCPCS | Performed by: STUDENT IN AN ORGANIZED HEALTH CARE EDUCATION/TRAINING PROGRAM

## 2018-08-29 PROCEDURE — G8427 DOCREV CUR MEDS BY ELIG CLIN: HCPCS | Performed by: STUDENT IN AN ORGANIZED HEALTH CARE EDUCATION/TRAINING PROGRAM

## 2018-08-29 PROCEDURE — 1036F TOBACCO NON-USER: CPT | Performed by: STUDENT IN AN ORGANIZED HEALTH CARE EDUCATION/TRAINING PROGRAM

## 2018-08-29 RX ORDER — LIDOCAINE 40 MG/G
CREAM TOPICAL PRN
Status: SHIPPED | OUTPATIENT
Start: 2018-08-29 | End: 2019-08-29

## 2018-08-29 RX ADMIN — LIDOCAINE: 40 CREAM TOPICAL at 14:23

## 2018-08-29 NOTE — PROGRESS NOTES
daily 5/18/18   Giles Alvarado MD   traMADol (ULTRAM) 50 MG tablet Take 1 tablet by mouth every 6 hours as needed for Pain for up to 7 days. . 5/2/18 5/9/18  Jarred Crisostomo DPM   clopidogrel (PLAVIX) 75 MG tablet Take 1 tablet by mouth daily 4/20/18   Tarcey Scott MD   albuterol sulfate HFA (PROVENTIL HFA) 108 (90 Base) MCG/ACT inhaler Inhale 2 puffs into the lungs every 6 hours as needed for Wheezing 4/12/18   Farhad Vora, APRN - CNP       Objective     Vitals:    08/29/18 1308   BP: (!) 134/93   Pulse: 63       Lab Results   Component Value Date    LABA1C 4.9 08/08/2018       Physical Exam:  General:  Alert and oriented x3. In no acute distress. Lower Extremity Physical Exam:    Vascular: DP and PT pulses are palpable, Bilateral. CFT <5 seconds to all digits, Bilateral.  No edema, Bilateral.  Hair growth is absent to the level of the digits, Bilateral. Distal digits are cool to the touch. Neuro: Saph/sural/SP/DP/plantar sensation diminished to light touch. Protective sensation is intact to 7/10 sites as tested with a 5.07g SWMF, Bilateral.     Musculoskeletal: EHL/FHL/GS/TA gross motor intact. Tenderness to palpation of RLE wound. Gross deformity is hammer toe deformity digits 2-4, Bilateral.     Dermatologic: Wound noted to the R lateral leg. Polanco grade 1. Wound measures 2.3cm x 1.5cm x 0.2cm pre debridement and 2.3x1.5x0.3 cm post debridement. Wound base is 100% fibrotic. Mild periwound erythema. No purulence or active drainage. No streaking lymphangitis noted. Pain to palpation of the wound and periwound. Wound does not probe deep. Assessment   Luis Schroeder is a 51 y.o. male with     Diagnosis Orders   1. Peripheral arterial disease ([facility] [address]     2. History of angioplasty     3. Arterial insufficiency with ischemic ulcer ([facility] [address]     4.  Peripheral polyneuropathy          Plan   - Patient examined and evaluated  - Diagnosis and treatment options discussed in detail  - Wound was sharply debrided with dermal curette to the subcutaneous layer after application of topical lidocaine. Patient tolerated well. - Dispensed prescription for Santyl to be applied daily.  - Wound care dressing of sterile 4x4s and kerlix applied.  - Advised patient to change dressing twice daily.  - Patient to RTC in 1 week(s)  - Patient advised of the signs and symptoms of infection, and instructed that he should call this office or present to the ED should he note any of these. - Please, call the office with any questions or concerns     Orders Placed This Encounter   Medications    lidocaine (LMX) 4 % cream    collagenase (SANTYL) 250 UNIT/GM ointment     Sig: Apply topically daily. Dispense:  90 g     Refill:  2     No orders of the defined types were placed in this encounter.       Electronically signed by Kian Gonzáles DPM on 8/29/2018 at 2:07 PM

## 2018-09-05 ENCOUNTER — OFFICE VISIT (OUTPATIENT)
Dept: PODIATRY | Age: 48
End: 2018-09-05
Payer: COMMERCIAL

## 2018-09-05 VITALS
DIASTOLIC BLOOD PRESSURE: 84 MMHG | WEIGHT: 136.6 LBS | HEIGHT: 73 IN | SYSTOLIC BLOOD PRESSURE: 144 MMHG | BODY MASS INDEX: 18.1 KG/M2 | HEART RATE: 62 BPM

## 2018-09-05 DIAGNOSIS — Z98.62 HISTORY OF ANGIOPLASTY: ICD-10-CM

## 2018-09-05 DIAGNOSIS — I73.9 PERIPHERAL ARTERIAL DISEASE (HCC): ICD-10-CM

## 2018-09-05 DIAGNOSIS — I77.1 ARTERIAL INSUFFICIENCY WITH ISCHEMIC ULCER (HCC): Primary | ICD-10-CM

## 2018-09-05 DIAGNOSIS — G62.9 PERIPHERAL POLYNEUROPATHY: ICD-10-CM

## 2018-09-05 DIAGNOSIS — L98.499 ARTERIAL INSUFFICIENCY WITH ISCHEMIC ULCER (HCC): Primary | ICD-10-CM

## 2018-09-05 PROCEDURE — 99213 OFFICE O/P EST LOW 20 MIN: CPT

## 2018-09-05 PROCEDURE — 11042 DBRDMT SUBQ TIS 1ST 20SQCM/<: CPT | Performed by: STUDENT IN AN ORGANIZED HEALTH CARE EDUCATION/TRAINING PROGRAM

## 2018-09-05 PROCEDURE — 15275 SKIN SUB GRAFT FACE/NK/HF/G: CPT | Performed by: STUDENT IN AN ORGANIZED HEALTH CARE EDUCATION/TRAINING PROGRAM

## 2018-09-05 RX ORDER — SODIUM CHLORIDE 0.9 % (FLUSH) 0.9 %
SYRINGE (ML) INJECTION
Qty: 30 SYRINGE | Refills: 2 | Status: ON HOLD | OUTPATIENT
Start: 2018-09-05 | End: 2018-10-06 | Stop reason: HOSPADM

## 2018-09-05 RX ORDER — GAUZE BANDAGE 4" X 4"
BANDAGE TOPICAL
Qty: 100 EACH | Refills: 1 | Status: SHIPPED | OUTPATIENT
Start: 2018-09-05 | End: 2019-06-12 | Stop reason: ALTCHOICE

## 2018-09-05 RX ORDER — GAUZE BANDAGE 4.5"X147"
BANDAGE TOPICAL
Qty: 30 EACH | Refills: 2 | Status: SHIPPED | OUTPATIENT
Start: 2018-09-05 | End: 2019-06-12 | Stop reason: ALTCHOICE

## 2018-09-05 NOTE — PROGRESS NOTES
MG tablet Take 1 tablet by mouth 2 times daily 5/18/18   Jim Lindquist MD   traMADol (ULTRAM) 50 MG tablet Take 1 tablet by mouth every 6 hours as needed for Pain for up to 7 days. . 5/2/18 5/9/18  David Ortiz DPM   clopidogrel (PLAVIX) 75 MG tablet Take 1 tablet by mouth daily 4/20/18   Jim Lindquist MD   albuterol sulfate HFA (PROVENTIL HFA) 108 (90 Base) MCG/ACT inhaler Inhale 2 puffs into the lungs every 6 hours as needed for Wheezing 4/12/18   SUSANA Castellano - CNP       Objective     Vitals:    09/05/18 1437   BP: (!) 144/84   Pulse: 62       Lab Results   Component Value Date    LABA1C 4.9 08/08/2018       Physical Exam:  General:  Alert and oriented x3. In no acute distress. Lower Extremity Physical Exam:    Vascular: DP and PT pulses are palpable, Bilateral. CFT <5 seconds to all digits, Bilateral.  No edema, Bilateral.  Hair growth is absent to the level of the digits, Bilateral. Distal digits are cool to the touch. Neuro: Saph/sural/SP/DP/plantar sensation diminished to light touch. Protective sensation is intact to 7/10 sites as tested with a 5.07g SWMF, Bilateral.     Musculoskeletal: EHL/FHL/GS/TA gross motor intact. Tenderness to palpation of RLE wound. Gross deformity is hammer toe deformity digits 2-4, Bilateral.     Dermatologic: Wound noted to the R lateral leg. Polanco grade 1. Wound measures 2.3cm x 1.5cm x 0.2cm. Wound base is 100% fibrotic. Mild periwound erythema. No purulence or active drainage. No streaking lymphangitis noted. Pain to palpation of the wound and periwound. Wound does not probe deep. Assessment   Facundo Ramos is a 51 y.o. male with     Diagnosis Orders   1. Arterial insufficiency with ischemic ulcer ([facility] [address]     2. Peripheral arterial disease ([facility] [address]     3. History of angioplasty     4.  Peripheral polyneuropathy          Plan   - Patient examined and evaluated  - Diagnosis and treatment options discussed in detail  - 1st application of Epifix applied to Right lateral leg wound. Lot number: [id number], Exp date: 2023, Units used: 9. 3X 3 cm  sheet of epifix  - Patient to RTC in 1 week(s)  - Patient advised of the signs and symptoms of infection, and instructed that he should call this office or present to the ED should he note any of these. - Please, call the office with any questions or concerns     Orders Placed This Encounter   Medications    Gauze Pads & Dressings (KERLIX GAUZE ROLL LARGE) MISC     Si kerlix rolls for daily dressing changes     Dispense:  30 each     Refill:  2    Gauze Pads & Dressings (GAUZE DRESSING) 4\"X4\" PADS     Sig: Please dispense 100 gauze pads for patient's daily dressing changes     Dispense:  100 each     Refill:  1    sodium chloride flush 0.9 % injection     Sig: Dispense 30 10cc saline filled syringes for patient's daily wound care dressing flushes and WTD dressing. Dispense:  30 Syringe     Refill:  2     No orders of the defined types were placed in this encounter. Skin Substitute Graft Procedure Note      Farhad Morris  AGE: 51 y.o. GENDER: male  : 1970  TODAY'S DATE:  2018     Pre-procedural Diagnosis:   1. Arterial insufficiency with ischemic ulcer ([facility] [address]    2. Peripheral arterial disease ([facility] [address]    3. History of angioplasty    4. Peripheral polyneuropathy      Post-procedural Diagnosis: same  Wound Type:arterial  Pre-procedure measurements:  2.3cm x 1.5cm x 0.2cm  Wound 18 Other (Comment) Toe (Comment  which one) brown black (Active)   Number of days: 127        The wound needs epithelialization and we are present today to perform Skin substitute skin grafting. Due to wound size, the procedure may need to be staged. Procedure Note:  Skin Substitute Skin Graft   Type:  Epifix  Performed by:  Prasanth Anderson DPM  Assited by:  None    Patient was brought into the operating room/treatment room and remained on the wheeled gurney. The patient was positioned with the

## 2018-09-12 ENCOUNTER — OFFICE VISIT (OUTPATIENT)
Dept: PODIATRY | Age: 48
End: 2018-09-12
Payer: COMMERCIAL

## 2018-09-12 VITALS
BODY MASS INDEX: 18.29 KG/M2 | HEIGHT: 73 IN | SYSTOLIC BLOOD PRESSURE: 146 MMHG | DIASTOLIC BLOOD PRESSURE: 97 MMHG | WEIGHT: 138 LBS | HEART RATE: 75 BPM

## 2018-09-12 DIAGNOSIS — L98.499 ARTERIAL INSUFFICIENCY WITH ISCHEMIC ULCER (HCC): ICD-10-CM

## 2018-09-12 DIAGNOSIS — I73.9 PERIPHERAL ARTERIAL DISEASE (HCC): Primary | ICD-10-CM

## 2018-09-12 DIAGNOSIS — Z98.62 HISTORY OF ANGIOPLASTY: ICD-10-CM

## 2018-09-12 DIAGNOSIS — G62.9 PERIPHERAL POLYNEUROPATHY: ICD-10-CM

## 2018-09-12 DIAGNOSIS — I77.1 ARTERIAL INSUFFICIENCY WITH ISCHEMIC ULCER (HCC): ICD-10-CM

## 2018-09-12 PROCEDURE — G8419 CALC BMI OUT NRM PARAM NOF/U: HCPCS | Performed by: STUDENT IN AN ORGANIZED HEALTH CARE EDUCATION/TRAINING PROGRAM

## 2018-09-12 PROCEDURE — 15275 SKIN SUB GRAFT FACE/NK/HF/G: CPT | Performed by: STUDENT IN AN ORGANIZED HEALTH CARE EDUCATION/TRAINING PROGRAM

## 2018-09-12 PROCEDURE — 1036F TOBACCO NON-USER: CPT | Performed by: STUDENT IN AN ORGANIZED HEALTH CARE EDUCATION/TRAINING PROGRAM

## 2018-09-12 PROCEDURE — 2780000010 HC IMPLANT OTHER: Performed by: STUDENT IN AN ORGANIZED HEALTH CARE EDUCATION/TRAINING PROGRAM

## 2018-09-12 PROCEDURE — G8427 DOCREV CUR MEDS BY ELIG CLIN: HCPCS | Performed by: STUDENT IN AN ORGANIZED HEALTH CARE EDUCATION/TRAINING PROGRAM

## 2018-09-12 PROCEDURE — 11042 DBRDMT SUBQ TIS 1ST 20SQCM/<: CPT | Performed by: STUDENT IN AN ORGANIZED HEALTH CARE EDUCATION/TRAINING PROGRAM

## 2018-09-12 NOTE — PROGRESS NOTES
[address],  [place]  Tel: [phone]     Subjective     CC: R lateral leg wound    HPI:  Kavitha Love is a 51y.o. year old male who presents to clinic today for follow-up of a right lateral arterial ulceration. The patient has previous angioplasties to the right and left lower extremity with multiple stents. The patient was last seen by Dr. Thony Plummer (6/5/2018) for aortogram with RLE runoff. Patent R SFA, profunda and popliteal arteries; severe tibial disease with occlusion. TP trunk is occluded with peroneal artery reconstruction. The patient denies any history of diabetes. The patient does relate a past medical history significant for multiple CVAs. The patient does relate tingling and burning sensations to his right dorsal foot. Patient denies any drainage from the wound. 1st Epifix was applied at the last visit and Patient was instructed to leave dressing intact until follow-up visit today. Previously, patient states he was unable to get Santyl due to prior authorization issues with his pharmacy. Patient denies any constitutional symptoms. He denies any other pedal complaints. Primary care physician is Tano Quiros MD.    ROS:    Constitutional: Denies nausea, vomiting, fever, chills. Neurologic: Denies numbness, tingling, and burning in the feet. Vascular: Denies symptoms of lower extremity claudication. Skin: Denies open wounds. Otherwise negative except as noted in the HPI.      PMH:  Past Medical History:   Diagnosis Date    CAD (coronary artery disease)     COPD (chronic obstructive pulmonary disease) [facility] [address]     Hypertension     MRSA (methicillin resistant staph aureus) culture positive 05/02/2018    toe    Peripheral vascular disease ([facility] [address]        Surgical History:   Past Surgical History:   Procedure Laterality Date    CORONARY ANGIOPLASTY WITH STENT PLACEMENT         Social History:  Social History   Substance Use Topics    Smoking status: Former Smoker     Packs/day: 1.00     Years: 35.00     Types: Cigars     Quit date: 5/15/2018    Smokeless tobacco: Never Used    Alcohol use No       Medications:  Prior to Admission medications    Medication Sig Start Date End Date Taking? Authorizing Provider   Gauze Pads & Dressings (KERLIX GAUZE ROLL LARGE) MISC 30 kerlix rolls for daily dressing changes 9/5/18  Yes Arlon Favors, DPM   Gauze Pads & Dressings (GAUZE DRESSING) 4\"X4\" PADS Please dispense 100 gauze pads for patient's daily dressing changes 9/5/18  Yes Arlon Favors, DPM   sodium chloride flush 0.9 % injection Dispense 30 10cc saline filled syringes for patient's daily wound care dressing flushes and WTD dressing.  9/5/18  Yes Arlon Favors, DPM   Gauze Pads & Dressings [facility] GAUZE ROLL SMALL) MISC Apply to foot 8/24/18  Yes Pasqual Favre, MD   albuterol sulfate HFA (PROAIR HFA) 108 (90 Base) MCG/ACT inhaler Inhale 2 puffs into the lungs every 6 hours as needed for Wheezing 8/8/18  Yes Pasqual Favre, MD   albuterol-ipratropium (COMBIVENT RESPIMAT)  MCG/ACT AERS inhaler Inhale 1 puff into the lungs every 6 hours 8/8/18  Yes Pasqual Favre, MD   mometasone-formoterol [facility]) 200-5 MCG/ACT inhaler Inhale 2 puffs into the lungs every 12 hours 8/8/18  Yes Pasqual Favre, MD   sodium chloride (OCEAN NASAL SPRAY) 0.65 % nasal spray 1 spray by Nasal route as needed for Congestion 8/8/18  Yes Pasqual Favre, MD   metoprolol tartrate (LOPRESSOR) 25 MG tablet Take 1 tablet by mouth 2 times daily 7/23/18  Yes Garima Briones MD   amLODIPine (NORVASC) 5 MG tablet TAKE 1 TABLET BY MOUTH DAILY 7/9/18  Yes Garima Briones MD   atorvastatin (LIPITOR) 40 MG tablet TAKE 1 TABLET BY MOUTH DAILY 7/9/18  Yes Garima Briones MD   aspirin 81 MG tablet Take 81 mg by mouth daily   Yes Historical Provider, MD   varenicline (CHANTIX CONTINUING MONTH DHRUV) 1 MG tablet Take 1 tablet by mouth 2 times daily 5/18/18  Yes Tracey Scott MD   clopidogrel (PLAVIX) 75 MG tablet Take 1 tablet by mouth daily 4/20/18  Yes Tracey Scott MD   albuterol sulfate HFA (PROVENTIL HFA) 108 (90 Base) MCG/ACT inhaler Inhale 2 puffs into the lungs every 6 hours as needed for Wheezing 4/12/18  Yes Farhad Vora APRN - CNP   traMADol (ULTRAM) 50 MG tablet Take 1 tablet by mouth every 6 hours as needed for Pain for up to 7 days. . 5/2/18 5/9/18  Jarred Crisostomo DPM       Objective     Vitals:    09/12/18 1454   BP: (!) 146/97   Pulse: 75       Lab Results   Component Value Date    LABA1C 4.9 08/08/2018       Physical Exam:  General:  Alert and oriented x3. In no acute distress. Lower Extremity Physical Exam:    Vascular: DP and PT pulses are palpable, Bilateral. CFT <5 seconds to all digits, Bilateral.  No edema, Bilateral.  Hair growth is absent to the level of the digits, Bilateral. Distal digits are cool to the touch. Neuro: Saph/sural/SP/DP/plantar sensation diminished to light touch. Protective sensation is intact to 7/10 sites as tested with a 5.07g SWMF, Bilateral.     Musculoskeletal: EHL/FHL/GS/TA gross motor intact. Tenderness to palpation of RLE wound. Gross deformity is hammer toe deformity digits 2-4, Bilateral.     Dermatologic: Wound noted to the R lateral leg. Polanco grade 1. Wound measures 2.1cm x 1.5cm x 0.2cm previously measured (2.3cm x 1.5cm x 0.2cm). Wound base is 100% fibrotic. Mild periwound erythema. No purulence or active drainage. No streaking lymphangitis noted. Pain to palpation of the wound and periwound. Wound does not probe deep. Assessment   Luis Schroeder is a 51 y.o. male with     Diagnosis Orders   1. Peripheral arterial disease ([address]     2. Arterial insufficiency with ischemic ulcer ([address]     3. History of angioplasty     4.  Peripheral polyneuropathy          Plan   - Patient examined and evaluated  - Diagnosis and treatment options discussed in detail  - 2nd application of Epifix applied to the wound. The graft was fixated into place with steri-strips, covered with Adaptic/Mepitel, dry kerlix 4x4's and kerlix roll. Anesthesia: None    Bleeding: Minimal    Hemostasis:   by pressure    Response to treatment:  Well tolerated by patient. Patient was given post-procedural instruction not to remove the dressings until returning to the clinic. Instructed to call if any questions. Follow up:  One week for evaluation of skin grafts and dressing change        Rickie Fried DPM   Podiatric Medicine & Surgery  9/12/2018 at 3:14 PM

## 2018-09-12 NOTE — PROGRESS NOTES
Patient instructed to remove shoes and socks, instructed to sit in exam chair. Current PCP name is  and date of last visit 8/24/18. Do you have a follow up visit scheduled?   Yes 9/14/18

## 2018-09-14 ENCOUNTER — OFFICE VISIT (OUTPATIENT)
Dept: INTERNAL MEDICINE | Age: 48
End: 2018-09-14
Payer: COMMERCIAL

## 2018-09-14 VITALS
HEIGHT: 73 IN | SYSTOLIC BLOOD PRESSURE: 122 MMHG | HEART RATE: 61 BPM | WEIGHT: 140 LBS | BODY MASS INDEX: 18.55 KG/M2 | DIASTOLIC BLOOD PRESSURE: 81 MMHG

## 2018-09-14 DIAGNOSIS — J44.9 CHRONIC OBSTRUCTIVE PULMONARY DISEASE, UNSPECIFIED COPD TYPE (HCC): ICD-10-CM

## 2018-09-14 DIAGNOSIS — I10 ESSENTIAL HYPERTENSION: ICD-10-CM

## 2018-09-14 DIAGNOSIS — Z00.00 HEALTH CARE MAINTENANCE: Primary | ICD-10-CM

## 2018-09-14 DIAGNOSIS — I73.9 PVD (PERIPHERAL VASCULAR DISEASE) (HCC): ICD-10-CM

## 2018-09-14 PROCEDURE — 99214 OFFICE O/P EST MOD 30 MIN: CPT | Performed by: INTERNAL MEDICINE

## 2018-09-14 PROCEDURE — G8419 CALC BMI OUT NRM PARAM NOF/U: HCPCS | Performed by: INTERNAL MEDICINE

## 2018-09-14 PROCEDURE — G8926 SPIRO NO PERF OR DOC: HCPCS | Performed by: INTERNAL MEDICINE

## 2018-09-14 PROCEDURE — G8427 DOCREV CUR MEDS BY ELIG CLIN: HCPCS | Performed by: INTERNAL MEDICINE

## 2018-09-14 PROCEDURE — 99211 OFF/OP EST MAY X REQ PHY/QHP: CPT | Performed by: INTERNAL MEDICINE

## 2018-09-14 PROCEDURE — 1036F TOBACCO NON-USER: CPT | Performed by: INTERNAL MEDICINE

## 2018-09-14 PROCEDURE — 3023F SPIROM DOC REV: CPT | Performed by: INTERNAL MEDICINE

## 2018-09-14 RX ORDER — AMLODIPINE BESYLATE 5 MG/1
5 TABLET ORAL DAILY
Qty: 90 TABLET | Refills: 1 | Status: SHIPPED | OUTPATIENT
Start: 2018-09-14 | End: 2019-01-07 | Stop reason: SDUPTHER

## 2018-09-14 RX ORDER — ATORVASTATIN CALCIUM 40 MG/1
40 TABLET, FILM COATED ORAL DAILY
Qty: 90 TABLET | Refills: 1 | Status: SHIPPED | OUTPATIENT
Start: 2018-09-14 | End: 2019-05-09 | Stop reason: SDUPTHER

## 2018-09-14 RX ORDER — ALBUTEROL SULFATE 90 UG/1
2 AEROSOL, METERED RESPIRATORY (INHALATION) EVERY 6 HOURS PRN
Qty: 1 INHALER | Refills: 5 | Status: SHIPPED | OUTPATIENT
Start: 2018-09-14 | End: 2019-02-06 | Stop reason: SDUPTHER

## 2018-09-14 RX ORDER — CLOPIDOGREL BISULFATE 75 MG/1
75 TABLET ORAL DAILY
Qty: 90 TABLET | Refills: 1 | Status: SHIPPED | OUTPATIENT
Start: 2018-09-14 | End: 2019-02-28 | Stop reason: SDUPTHER

## 2018-09-14 NOTE — PROGRESS NOTES
Hypertension Visit Information    BP Readings from Last 3 Encounters:   09/12/18 (!) 146/97   09/05/18 (!) 144/84   08/29/18 (!) 134/93       LDL Cholesterol (mg/dL)   Date Value   05/02/2018 41     HDL (mg/dL)   Date Value   05/02/2018 30 (L)     BUN (mg/dL)   Date Value   06/05/2018 18     CREATININE (mg/dL)   Date Value   06/05/2018 1.08     Glucose (mg/dL)   Date Value   04/30/2018 81              Have you changed or started any medications since your last visit including any over-the-counter medicines, vitamins, or herbal medicines? no   Are you having any side effects from any of your medications? -  no  Have you stopped taking any of your medications? Is so, why? -  no    Have you seen any other physician or provider since your last visit? Yes - Records Obtained, Pt saw Dr. Elli Gaspar in [address] [place] [address] on 9/12/18  Have you had any other diagnostic tests since your last visit? No  Have you been seen in the emergency room and/or had an admission to a hospital since we last saw you? No  Have you had your routine dental cleaning in the past 6 months? no    Have you activated your [facility] account? If not, what are your barriers?  No: Declined     Patient Care Team:  Rae Frankel, MD as PCP - General (Internal Medicine)    Medical History Review  Past Medical, Family, and Social History reviewed and does contribute to the patient presenting condition    Health Maintenance   Topic Date Due    HIV screen  08/10/1984    Flu vaccine (1) 09/01/2018    Potassium monitoring  04/30/2019    Creatinine monitoring  06/05/2019    Lipid screen  05/02/2023    DTaP/Tdap/Td vaccine (2 - Td) 05/09/2028    Pneumococcal med risk  Completed

## 2018-09-17 ENCOUNTER — TELEPHONE (OUTPATIENT)
Dept: INTERNAL MEDICINE | Age: 48
End: 2018-09-17

## 2018-09-19 ENCOUNTER — OFFICE VISIT (OUTPATIENT)
Dept: PODIATRY | Age: 48
End: 2018-09-19
Payer: COMMERCIAL

## 2018-09-19 VITALS
DIASTOLIC BLOOD PRESSURE: 78 MMHG | HEART RATE: 74 BPM | HEIGHT: 73 IN | WEIGHT: 137 LBS | BODY MASS INDEX: 18.16 KG/M2 | SYSTOLIC BLOOD PRESSURE: 131 MMHG

## 2018-09-19 DIAGNOSIS — G62.9 PERIPHERAL POLYNEUROPATHY: ICD-10-CM

## 2018-09-19 DIAGNOSIS — I73.9 PERIPHERAL ARTERIAL DISEASE (HCC): ICD-10-CM

## 2018-09-19 DIAGNOSIS — I77.1 ARTERIAL INSUFFICIENCY WITH ISCHEMIC ULCER (HCC): Primary | ICD-10-CM

## 2018-09-19 DIAGNOSIS — Z98.62 HISTORY OF ANGIOPLASTY: ICD-10-CM

## 2018-09-19 DIAGNOSIS — L98.499 ARTERIAL INSUFFICIENCY WITH ISCHEMIC ULCER (HCC): Primary | ICD-10-CM

## 2018-09-19 PROCEDURE — 11042 DBRDMT SUBQ TIS 1ST 20SQCM/<: CPT | Performed by: STUDENT IN AN ORGANIZED HEALTH CARE EDUCATION/TRAINING PROGRAM

## 2018-09-19 PROCEDURE — 99213 OFFICE O/P EST LOW 20 MIN: CPT | Performed by: STUDENT IN AN ORGANIZED HEALTH CARE EDUCATION/TRAINING PROGRAM

## 2018-09-19 NOTE — PROGRESS NOTES
Patient instructed to remove shoes and socks, instructed to sit in exam chair. Current PCP name is  and date of last visit 8/24/18. Do you have a follow up visit scheduled?   Yes 9/14/18
Topics    Smoking status: Former Smoker     Packs/day: 1.00     Years: 35.00     Types: Cigars     Quit date: 5/15/2018    Smokeless tobacco: Never Used    Alcohol use No       Medications:  Prior to Admission medications    Medication Sig Start Date End Date Taking? Authorizing Provider   clopidogrel (PLAVIX) 75 MG tablet Take 1 tablet by mouth daily 9/14/18  Yes Rula Daily, MD   atorvastatin (LIPITOR) 40 MG tablet Take 1 tablet by mouth daily 9/14/18  Yes Rula Daily, MD   amLODIPine (NORVASC) 5 MG tablet Take 1 tablet by mouth daily 9/14/18  Yes Rula Daily, MD   metoprolol tartrate (LOPRESSOR) 25 MG tablet Take 1 tablet by mouth 2 times daily 9/14/18  Yes Rula Daily, MD   mometasone-formoterol [facility]) 200-5 MCG/ACT inhaler Inhale 2 puffs into the lungs every 12 hours 9/14/18  Yes Rula Daily, MD   albuterol sulfate HFA (PROAIR HFA) 108 (90 Base) MCG/ACT inhaler Inhale 2 puffs into the lungs every 6 hours as needed for Wheezing 9/14/18  Yes Rula Daily, MD   tiotropium ([facility] Floss) 18 MCG inhalation capsule Inhale 1 capsule into the lungs daily 9/14/18  Yes Rula Daily, MD   Gauze Pads & Dressings (KERLIX GAUZE ROLL LARGE) MISC 30 kerlix rolls for daily dressing changes 9/5/18  Yes Kumar Persaud DPM   Gauze Pads & Dressings (GAUZE DRESSING) 4\"X4\" PADS Please dispense 100 gauze pads for patient's daily dressing changes 9/5/18  Yes Kumar Persaud DPM   sodium chloride flush 0.9 % injection Dispense 30 10cc saline filled syringes for patient's daily wound care dressing flushes and WTD dressing.  9/5/18  Yes Kumar Persaud DPM   Gauze Pads & Dressings [facility] GAUZE ROLL SMALL) MISC Apply to foot 8/24/18  Yes Claire Josue MD   sodium chloride (OCEAN NASAL SPRAY) 0.65 % nasal spray 1 spray by Nasal route as needed for Congestion 8/8/18  Yes Claire Josue MD   aspirin 81 MG tablet Take 81 mg by mouth daily   Yes Historical Provider, MD   varenicline ([employer]

## 2018-09-21 ENCOUNTER — OFFICE VISIT (OUTPATIENT)
Dept: PODIATRY | Age: 48
End: 2018-09-21
Payer: COMMERCIAL

## 2018-09-21 VITALS
SYSTOLIC BLOOD PRESSURE: 139 MMHG | HEART RATE: 62 BPM | HEIGHT: 73 IN | BODY MASS INDEX: 18.55 KG/M2 | DIASTOLIC BLOOD PRESSURE: 91 MMHG | WEIGHT: 140 LBS

## 2018-09-21 DIAGNOSIS — I73.9 PERIPHERAL ARTERIAL DISEASE (HCC): ICD-10-CM

## 2018-09-21 DIAGNOSIS — Z98.62 HISTORY OF ANGIOPLASTY: ICD-10-CM

## 2018-09-21 DIAGNOSIS — G62.9 PERIPHERAL POLYNEUROPATHY: ICD-10-CM

## 2018-09-21 DIAGNOSIS — I77.1 ARTERIAL INSUFFICIENCY WITH ISCHEMIC ULCER (HCC): Primary | ICD-10-CM

## 2018-09-21 DIAGNOSIS — L98.499 ARTERIAL INSUFFICIENCY WITH ISCHEMIC ULCER (HCC): Primary | ICD-10-CM

## 2018-09-21 PROCEDURE — 1036F TOBACCO NON-USER: CPT | Performed by: STUDENT IN AN ORGANIZED HEALTH CARE EDUCATION/TRAINING PROGRAM

## 2018-09-21 PROCEDURE — 99212 OFFICE O/P EST SF 10 MIN: CPT | Performed by: STUDENT IN AN ORGANIZED HEALTH CARE EDUCATION/TRAINING PROGRAM

## 2018-09-21 PROCEDURE — 15275 SKIN SUB GRAFT FACE/NK/HF/G: CPT | Performed by: STUDENT IN AN ORGANIZED HEALTH CARE EDUCATION/TRAINING PROGRAM

## 2018-09-21 PROCEDURE — 2780000010 HC IMPLANT OTHER: Performed by: STUDENT IN AN ORGANIZED HEALTH CARE EDUCATION/TRAINING PROGRAM

## 2018-09-21 PROCEDURE — G8427 DOCREV CUR MEDS BY ELIG CLIN: HCPCS | Performed by: STUDENT IN AN ORGANIZED HEALTH CARE EDUCATION/TRAINING PROGRAM

## 2018-09-21 PROCEDURE — G8419 CALC BMI OUT NRM PARAM NOF/U: HCPCS | Performed by: STUDENT IN AN ORGANIZED HEALTH CARE EDUCATION/TRAINING PROGRAM

## 2018-09-21 NOTE — PROGRESS NOTES
[address],  [place]  Tel: [phone]     Subjective     CC: R lateral leg wound    HPI:  Carolyn Rojas is a 51y.o. year old male who presents to clinic today for follow-up of a right lateral arterial ulceration. The patient has previous angioplasties to the right and left lower extremity with multiple stents. The patient was last seen by Dr. Thomas Perez (6/5/2018) for aortogram with RLE runoff. Patent R SFA, profunda and popliteal arteries; severe tibial disease with occlusion. TP trunk is occluded with peroneal artery reconstruction. The patient denies any history of diabetes. The patient does relate a past medical history significant for multiple CVAs. The patient does relate tingling and burning sensations to his right dorsal foot. Patient denies any drainage from the wound. 2nd Epifix was applied 9/12/18. Patient was seen in clinic on 9/19/18, and the wound was debrided sharply with dermal curette. However, a 3 x 3 cm at the fix was unavailable on that day so the patient was instructed to return today for application of the graft. Patient was instructed to leave dressing intact until follow-up visit today. Previously, patient states he was unable to get Santyl due to prior authorization issues with his pharmacy. Patient denies any constitutional symptoms. He denies any other pedal complaints. Primary care physician is Nubia Winston MD.    ROS:    Constitutional: Denies nausea, vomiting, fever, chills. Neurologic: Denies numbness, tingling, and burning in the feet. Vascular: Denies symptoms of lower extremity claudication. Skin: Denies open wounds. Otherwise negative except as noted in the HPI.      PMH:  Past Medical History:   Diagnosis Date    CAD (coronary artery disease)     COPD (chronic obstructive pulmonary disease) [facility] [address]     Hypertension     MRSA (methicillin resistant staph aureus) culture positive 05/02/2018    toe    erythema, or necrotic tissue. Topical lidocaine used.      The wound bed was flushed with normal saline. Next, the entire skin substitute - Epifix was applied to the wound. The graft was fixated into place with steri-strips, covered with Adaptic/Mepitel, dry kerlix 4x4's and kerlix roll.     Anesthesia: None     Bleeding: Minimal     Hemostasis:   by pressure     Response to treatment:  Well tolerated by patient.       Patient was given post-procedural instruction not to remove the dressings until returning to the clinic. Instructed to call if any questions.       Follow up:  One week for evaluation of skin grafts and dressing change

## 2018-09-28 ENCOUNTER — OFFICE VISIT (OUTPATIENT)
Dept: PODIATRY | Age: 48
End: 2018-09-28
Payer: COMMERCIAL

## 2018-09-28 VITALS
HEIGHT: 74 IN | BODY MASS INDEX: 17.92 KG/M2 | SYSTOLIC BLOOD PRESSURE: 157 MMHG | WEIGHT: 139.6 LBS | HEART RATE: 65 BPM | DIASTOLIC BLOOD PRESSURE: 97 MMHG

## 2018-09-28 DIAGNOSIS — I73.9 PERIPHERAL ARTERIAL DISEASE (HCC): ICD-10-CM

## 2018-09-28 DIAGNOSIS — Z98.62 HISTORY OF ANGIOPLASTY: ICD-10-CM

## 2018-09-28 DIAGNOSIS — G62.9 PERIPHERAL POLYNEUROPATHY: ICD-10-CM

## 2018-09-28 DIAGNOSIS — L98.499 ARTERIAL INSUFFICIENCY WITH ISCHEMIC ULCER (HCC): Primary | ICD-10-CM

## 2018-09-28 DIAGNOSIS — I77.1 ARTERIAL INSUFFICIENCY WITH ISCHEMIC ULCER (HCC): Primary | ICD-10-CM

## 2018-09-28 PROCEDURE — G8419 CALC BMI OUT NRM PARAM NOF/U: HCPCS | Performed by: STUDENT IN AN ORGANIZED HEALTH CARE EDUCATION/TRAINING PROGRAM

## 2018-09-28 PROCEDURE — 11042 DBRDMT SUBQ TIS 1ST 20SQCM/<: CPT | Performed by: STUDENT IN AN ORGANIZED HEALTH CARE EDUCATION/TRAINING PROGRAM

## 2018-09-28 PROCEDURE — 15275 SKIN SUB GRAFT FACE/NK/HF/G: CPT | Performed by: STUDENT IN AN ORGANIZED HEALTH CARE EDUCATION/TRAINING PROGRAM

## 2018-09-28 PROCEDURE — 2780000010 HC IMPLANT OTHER: Performed by: STUDENT IN AN ORGANIZED HEALTH CARE EDUCATION/TRAINING PROGRAM

## 2018-09-28 PROCEDURE — 99212 OFFICE O/P EST SF 10 MIN: CPT | Performed by: STUDENT IN AN ORGANIZED HEALTH CARE EDUCATION/TRAINING PROGRAM

## 2018-09-28 PROCEDURE — 1036F TOBACCO NON-USER: CPT | Performed by: STUDENT IN AN ORGANIZED HEALTH CARE EDUCATION/TRAINING PROGRAM

## 2018-09-28 PROCEDURE — G8428 CUR MEDS NOT DOCUMENT: HCPCS | Performed by: STUDENT IN AN ORGANIZED HEALTH CARE EDUCATION/TRAINING PROGRAM

## 2018-09-28 NOTE — PROGRESS NOTES
[address] [place] [address],  [place]  Tel: [phone]     Subjective     CC: R lateral leg wound    HPI:  Maria Dolores Smith is a 51y.o. year old male who presents to clinic today for follow-up of a right lateral arterial ulceration. The patient has previous angioplasties to the right and left lower extremity with multiple stents. The patient was last seen by Dr. Sean Lala (6/5/2018) for aortogram with RLE runoff. Patent R SFA, profunda and popliteal arteries; severe tibial disease with occlusion. TP trunk is occluded with peroneal artery reconstruction. The patient denies any history of diabetes. The patient does relate a past medical history significant for multiple CVAs. The patient does relate tingling and burning sensations to his right dorsal foot. Patient denies any drainage from the wound. 3rd Epifix was applied 9/21/18. Patient was instructed to leave dressing intact until follow-up visit today. Previously, patient states he was unable to get Santyl due to prior authorization issues with his pharmacy. Patient denies any constitutional symptoms. He denies any other pedal complaints. Primary care physician is Med Li MD.    ROS:    Constitutional: Denies nausea, vomiting, fever, chills. Neurologic: Denies numbness, tingling, and burning in the feet. Vascular: Denies symptoms of lower extremity claudication. Skin: Denies open wounds. Otherwise negative except as noted in the HPI.      PMH:  Past Medical History:   Diagnosis Date    CAD (coronary artery disease)     [facility]chronic obstructive pulmonary disease) [facility] [address]     Hypertension     MRSA (methicillin resistant staph aureus) culture positive 05/02/2018    toe    Peripheral vascular disease ([facility] [address]        Surgical History:   Past Surgical History:   Procedure Laterality Date    CORONARY ANGIOPLASTY WITH STENT PLACEMENT         Social History:  Social History   Substance Use Topics    Take 81 mg by mouth daily    Historical Provider, MD   varenicline (CHANTIX CONTINUING MONTH DHRUV) 1 MG tablet Take 1 tablet by mouth 2 times daily 5/18/18   Imelda Banda MD   traMADol (ULTRAM) 50 MG tablet Take 1 tablet by mouth every 6 hours as needed for Pain for up to 7 days. . 5/2/18 5/9/18  Majo Bassett DPM       Objective     There were no vitals filed for this visit. Lab Results   Component Value Date    LABA1C 4.9 08/08/2018       Physical Exam:  General:  Alert and oriented x3. In no acute distress. Lower Extremity Physical Exam:    Vascular: DP and PT pulses are palpable, Bilateral. CFT <5 seconds to all digits, Bilateral.  No edema, Bilateral.  Hair growth is absent to the level of the digits, Bilateral. Distal digits are cool to the touch. Neuro: Saph/sural/SP/DP/plantar sensation diminished to light touch. Protective sensation is intact to 7/10 sites as tested with a 5.07g SWMF, Bilateral.     Musculoskeletal: EHL/FHL/GS/TA gross motor intact. Tenderness to palpation of RLE wound. Gross deformity is hammer toe deformity digits 2-4, Bilateral.     Dermatologic: Wound noted to the R lateral leg. Polanco grade 1. Wound measures 1.8cm x 1.0cm 0.1cm. Wound previously measured 2.0cm x 1.3cm x 0.2cm. Wound base is 90% granular. Mild periwound erythema. No purulence or active drainage. No streaking lymphangitis noted. Pain to palpation of the wound and periwound. Wound does not probe deep. 9/19/18            Assessment   Mony Deleon is a 51 y.o. male with     Diagnosis Orders   1. Arterial insufficiency with ischemic ulcer ([facility] [address]     2. Peripheral arterial disease ([facility] [address]     3. History of angioplasty     4. Peripheral polyneuropathy          Plan   - Patient examined and evaluated  - Diagnosis and treatment options discussed in detail  - 4th application of Epifix applied to Right lateral leg wound. Lot number: [id number], Exp date: 10/01/2020, Units used: 9.  Size 3x3cm  -Wound was sharply debrided down to subcutaneous tissue with dermal curette, patient tolerated well. - Patient to RTC in 1 week(s)  - Patient advised of the signs and symptoms of infection, and instructed that he should call this office or present to the ED should he note any of these. - Please, call the office with any questions or concerns.        Encounter Medications    No orders of the defined types were placed in this encounter.      No orders of the defined types were placed in this encounter.     Skin Substitute Graft Procedure Note        Jessica Gómez  AGE: 51 y.o. GENDER: male  : 1970  TODAY'S DATE:  2018     Pre-procedural Diagnosis:   1. Peripheral arterial disease ([facility] [address]    2. Arterial insufficiency with ischemic ulcer ([facility] [address]    3. History of angioplasty    4. Peripheral polyneuropathy       Post-procedural Diagnosis: same  Wound Type: Arterial  Pre-procedure measurements:  2.0cm x 1.3cm x 0.2cm  Wound 18 Other (Comment) Toe (Comment  which one) brown black (Active)   Number of days: 127         The wound needs epithelialization and we are present today to perform Skin substitute skin grafting. Due to wound size, the procedure may need to be staged.        Procedure Note:  Skin Substitute Skin Graft   Type:  Epifix  Performed by:  Sarmad Singer DPM  Assited by:  None     Patient was brought into the operating room/treatment room and remained on the wheeled gurney. The patient was positioned with the [facility] elevated 30 degrees. The Right  Was prepped and draped in the usual sterile manner. Ulcerations noted as stated above. No signs of infection seen. No purulence, erythema, or necrotic tissue. Topical lidocaine used.      The wound bed was flushed with normal saline. Next, the entire skin substitute - Epifix was applied to the wound.  The graft was fixated into place with steri-strips, covered with Adaptic/Mepitel, dry kerlix 4x4's and kerlix roll.     Anesthesia: None     Bleeding: Minimal     Hemostasis:   by pressure     Response to treatment:  Well tolerated by patient.       Patient was given post-procedural instruction not to remove the dressings until returning to the clinic. Instructed to call if any questions.       Follow up: One week for evaluation of skin grafts and dressing change      I performed a history and physical examination of the patient and discussed management with the resident. I reviewed the residents note and agree with the documented findings and plan of care. Any areas of disagreement are noted on the chart. I was personally present for the key portions of any procedures. I have documented in the chart those procedures where I was not present during the key portions. I have reviewed the Podiatry Resident progress note. I agree with the chief complaint, past medical history, past surgical history, allergies, medications, social and family history as documented unless otherwise noted below. Documentation of the HPI, Physical Exam and Medical Decision Making performed by medical students or scribes is based on my personal performance of the HPI, PE and MDM. I have personally evaluated this patient and have completed at least one if not all key elements of the E/M (history, physical exam, and MDM). Additional findings are as noted.      Electronically signed by Harmony Reyna DPM on 9/28/2018 at 3:48 PM

## 2018-10-05 ENCOUNTER — HOSPITAL ENCOUNTER (INPATIENT)
Age: 48
LOS: 1 days | Discharge: HOME | DRG: 207 | End: 2018-10-06
Attending: PODIATRIST | Admitting: PODIATRIST
Payer: COMMERCIAL

## 2018-10-05 ENCOUNTER — OFFICE VISIT (OUTPATIENT)
Dept: PODIATRY | Age: 48
DRG: 207 | End: 2018-10-05
Payer: COMMERCIAL

## 2018-10-05 VITALS
SYSTOLIC BLOOD PRESSURE: 122 MMHG | HEART RATE: 76 BPM | HEIGHT: 74 IN | BODY MASS INDEX: 18.33 KG/M2 | DIASTOLIC BLOOD PRESSURE: 81 MMHG | WEIGHT: 142.8 LBS

## 2018-10-05 DIAGNOSIS — Z98.62 HISTORY OF ANGIOPLASTY: ICD-10-CM

## 2018-10-05 DIAGNOSIS — I73.9 PERIPHERAL ARTERIAL DISEASE (HCC): ICD-10-CM

## 2018-10-05 DIAGNOSIS — R79.89 ELEVATED SERUM CREATININE: Primary | ICD-10-CM

## 2018-10-05 DIAGNOSIS — I77.1 ARTERIAL INSUFFICIENCY WITH ISCHEMIC ULCER (HCC): Primary | ICD-10-CM

## 2018-10-05 DIAGNOSIS — L98.499 ARTERIAL INSUFFICIENCY WITH ISCHEMIC ULCER (HCC): Primary | ICD-10-CM

## 2018-10-05 DIAGNOSIS — G62.9 PERIPHERAL POLYNEUROPATHY: ICD-10-CM

## 2018-10-05 PROBLEM — I99.8 ISCHEMIA OF RIGHT LOWER EXTREMITY: Status: ACTIVE | Noted: 2018-10-05

## 2018-10-05 PROBLEM — J44.9 COPD (CHRONIC OBSTRUCTIVE PULMONARY DISEASE) (HCC): Status: ACTIVE | Noted: 2018-10-05

## 2018-10-05 LAB
ABSOLUTE EOS #: 0.56 K/UL (ref 0–0.44)
ABSOLUTE IMMATURE GRANULOCYTE: 0.07 K/UL (ref 0–0.3)
ABSOLUTE LYMPH #: 2.5 K/UL (ref 1.1–3.7)
ABSOLUTE MONO #: 1.42 K/UL (ref 0.1–1.2)
ANION GAP SERPL CALCULATED.3IONS-SCNC: 17 MMOL/L (ref 9–17)
BASOPHILS # BLD: 2 % (ref 0–2)
BASOPHILS ABSOLUTE: 0.29 K/UL (ref 0–0.2)
BUN BLDV-MCNC: 17 MG/DL (ref 6–20)
BUN/CREAT BLD: ABNORMAL (ref 9–20)
CALCIUM SERPL-MCNC: 9.4 MG/DL (ref 8.6–10.4)
CHLORIDE BLD-SCNC: 101 MMOL/L (ref 98–107)
CO2: 21 MMOL/L (ref 20–31)
CREAT SERPL-MCNC: 1.23 MG/DL (ref 0.7–1.2)
DIFFERENTIAL TYPE: ABNORMAL
EOSINOPHILS RELATIVE PERCENT: 4 % (ref 1–4)
GFR AFRICAN AMERICAN: >60 ML/MIN
GFR NON-AFRICAN AMERICAN: >60 ML/MIN
GFR SERPL CREATININE-BSD FRML MDRD: ABNORMAL ML/MIN/{1.73_M2}
GFR SERPL CREATININE-BSD FRML MDRD: ABNORMAL ML/MIN/{1.73_M2}
GLUCOSE BLD-MCNC: 79 MG/DL (ref 70–99)
HCT VFR BLD CALC: 49.1 % (ref 40.7–50.3)
HEMOGLOBIN: 15.5 G/DL (ref 13–17)
IMMATURE GRANULOCYTES: 1 %
LYMPHOCYTES # BLD: 16 % (ref 24–43)
MCH RBC QN AUTO: 28.3 PG (ref 25.2–33.5)
MCHC RBC AUTO-ENTMCNC: 31.6 G/DL (ref 28.4–34.8)
MCV RBC AUTO: 89.6 FL (ref 82.6–102.9)
MONOCYTES # BLD: 9 % (ref 3–12)
NRBC AUTOMATED: 0 PER 100 WBC
PDW BLD-RTO: 13.7 % (ref 11.8–14.4)
PLATELET # BLD: 832 K/UL (ref 138–453)
PLATELET ESTIMATE: ABNORMAL
PMV BLD AUTO: 9.6 FL (ref 8.1–13.5)
POTASSIUM SERPL-SCNC: 4.2 MMOL/L (ref 3.7–5.3)
RBC # BLD: 5.48 M/UL (ref 4.21–5.77)
RBC # BLD: ABNORMAL 10*6/UL
SEG NEUTROPHILS: 68 % (ref 36–65)
SEGMENTED NEUTROPHILS ABSOLUTE COUNT: 10.44 K/UL (ref 1.5–8.1)
SODIUM BLD-SCNC: 139 MMOL/L (ref 135–144)
WBC # BLD: 15.3 K/UL (ref 3.5–11.3)
WBC # BLD: ABNORMAL 10*3/UL

## 2018-10-05 PROCEDURE — 93923 UPR/LXTR ART STDY 3+ LVLS: CPT

## 2018-10-05 PROCEDURE — G0378 HOSPITAL OBSERVATION PER HR: HCPCS

## 2018-10-05 PROCEDURE — 1200000000 HC SEMI PRIVATE

## 2018-10-05 PROCEDURE — 99213 OFFICE O/P EST LOW 20 MIN: CPT | Performed by: STUDENT IN AN ORGANIZED HEALTH CARE EDUCATION/TRAINING PROGRAM

## 2018-10-05 PROCEDURE — 1036F TOBACCO NON-USER: CPT | Performed by: STUDENT IN AN ORGANIZED HEALTH CARE EDUCATION/TRAINING PROGRAM

## 2018-10-05 PROCEDURE — 99212 OFFICE O/P EST SF 10 MIN: CPT | Performed by: STUDENT IN AN ORGANIZED HEALTH CARE EDUCATION/TRAINING PROGRAM

## 2018-10-05 PROCEDURE — 4A03X51 MEASUREMENT OF ARTERIAL FLOW, PERIPHERAL, EXTERNAL APPROACH: ICD-10-PCS | Performed by: PSYCHIATRY & NEUROLOGY

## 2018-10-05 PROCEDURE — 36415 COLL VENOUS BLD VENIPUNCTURE: CPT

## 2018-10-05 PROCEDURE — 6370000000 HC RX 637 (ALT 250 FOR IP): Performed by: STUDENT IN AN ORGANIZED HEALTH CARE EDUCATION/TRAINING PROGRAM

## 2018-10-05 PROCEDURE — G8484 FLU IMMUNIZE NO ADMIN: HCPCS | Performed by: STUDENT IN AN ORGANIZED HEALTH CARE EDUCATION/TRAINING PROGRAM

## 2018-10-05 PROCEDURE — 80048 BASIC METABOLIC PNL TOTAL CA: CPT

## 2018-10-05 PROCEDURE — 2580000003 HC RX 258: Performed by: STUDENT IN AN ORGANIZED HEALTH CARE EDUCATION/TRAINING PROGRAM

## 2018-10-05 PROCEDURE — G8420 CALC BMI NORM PARAMETERS: HCPCS | Performed by: STUDENT IN AN ORGANIZED HEALTH CARE EDUCATION/TRAINING PROGRAM

## 2018-10-05 PROCEDURE — 85025 COMPLETE CBC W/AUTO DIFF WBC: CPT

## 2018-10-05 PROCEDURE — G0379 DIRECT REFER HOSPITAL OBSERV: HCPCS

## 2018-10-05 PROCEDURE — G8428 CUR MEDS NOT DOCUMENT: HCPCS | Performed by: STUDENT IN AN ORGANIZED HEALTH CARE EDUCATION/TRAINING PROGRAM

## 2018-10-05 RX ORDER — SODIUM CHLORIDE 0.9 % (FLUSH) 0.9 %
10 SYRINGE (ML) INJECTION EVERY 12 HOURS SCHEDULED
Status: DISCONTINUED | OUTPATIENT
Start: 2018-10-05 | End: 2018-10-06 | Stop reason: HOSPADM

## 2018-10-05 RX ORDER — TRAMADOL HYDROCHLORIDE 50 MG/1
100 TABLET ORAL EVERY 6 HOURS PRN
Status: DISCONTINUED | OUTPATIENT
Start: 2018-10-05 | End: 2018-10-06 | Stop reason: HOSPADM

## 2018-10-05 RX ORDER — AMLODIPINE BESYLATE 5 MG/1
5 TABLET ORAL DAILY
Status: DISCONTINUED | OUTPATIENT
Start: 2018-10-05 | End: 2018-10-06 | Stop reason: HOSPADM

## 2018-10-05 RX ORDER — ASPIRIN 81 MG/1
81 TABLET ORAL DAILY
Status: DISCONTINUED | OUTPATIENT
Start: 2018-10-05 | End: 2018-10-06 | Stop reason: HOSPADM

## 2018-10-05 RX ORDER — SODIUM CHLORIDE 0.9 % (FLUSH) 0.9 %
10 SYRINGE (ML) INJECTION PRN
Status: DISCONTINUED | OUTPATIENT
Start: 2018-10-05 | End: 2018-10-06 | Stop reason: HOSPADM

## 2018-10-05 RX ORDER — ONDANSETRON 2 MG/ML
4 INJECTION INTRAMUSCULAR; INTRAVENOUS EVERY 6 HOURS PRN
Status: DISCONTINUED | OUTPATIENT
Start: 2018-10-05 | End: 2018-10-06 | Stop reason: HOSPADM

## 2018-10-05 RX ORDER — TRAMADOL HYDROCHLORIDE 50 MG/1
50 TABLET ORAL EVERY 6 HOURS PRN
Status: DISCONTINUED | OUTPATIENT
Start: 2018-10-05 | End: 2018-10-06 | Stop reason: HOSPADM

## 2018-10-05 RX ORDER — ATORVASTATIN CALCIUM 40 MG/1
40 TABLET, FILM COATED ORAL DAILY
Status: DISCONTINUED | OUTPATIENT
Start: 2018-10-05 | End: 2018-10-06 | Stop reason: HOSPADM

## 2018-10-05 RX ORDER — ALBUTEROL SULFATE 90 UG/1
2 AEROSOL, METERED RESPIRATORY (INHALATION) EVERY 6 HOURS PRN
Status: DISCONTINUED | OUTPATIENT
Start: 2018-10-05 | End: 2018-10-06 | Stop reason: HOSPADM

## 2018-10-05 RX ORDER — CLOPIDOGREL BISULFATE 75 MG/1
75 TABLET ORAL DAILY
Status: DISCONTINUED | OUTPATIENT
Start: 2018-10-05 | End: 2018-10-06 | Stop reason: HOSPADM

## 2018-10-05 RX ADMIN — METOPROLOL TARTRATE 25 MG: 25 TABLET ORAL at 22:23

## 2018-10-05 RX ADMIN — SODIUM CHLORIDE, PRESERVATIVE FREE 10 ML: 5 INJECTION INTRAVENOUS at 22:31

## 2018-10-05 ASSESSMENT — PAIN SCALES - GENERAL
PAINLEVEL_OUTOF10: 0
PAINLEVEL_OUTOF10: 0

## 2018-10-05 NOTE — CONSULTS
negative  Musculoskeletal:negative  Endocrine: negative           Physical Exam  Blood pressure (!) 129/90, pulse 68, temperature 98 °F (36.7 °C), temperature source Oral, resp. rate 16, height 6' 2\" (1.88 m), weight 142 lb 9.6 oz (64.7 kg), SpO2 93 %. General Appearance: alert and oriented to person, place and time, well-developed and well-nourished, in no acute distress  Skin: warm and dry, no rash or erythema  Head: normocephalic and atraumatic  Eyes: pupils equal, round, and reactive to light, extraocular eye movements intact, conjunctivae normal  ENT: hearing grossly normal bilaterally  Neck: neck supple and non tender without mass, no thyromegaly or thyroid nodules, no cervical lymphadenopathy   Pulmonary/Chest: clear to auscultation bilaterally- no wheezes, rales or rhonchi, normal air movement, no respiratory distress  Cardiovascular: normal rate, regular rhythm, normal S1 and S2, no murmurs, rubs, clicks or gallops, distal pulses intact, no carotid bruits  Abdomen: soft, non-tender, non-distended, normal bowel sounds, no masses or organomegaly  Extremities: no cyanosis, clubbing or edema  Musculoskeletal: normal range of motion, no joint swelling, deformity or tenderness  Neurologic: no cranial nerve deficit and muscle strength normal    Data Review:    No results for input(s): WBC, HGB, HCT, MCV, PLT in the last 72 hours. No results for input(s): NA, K, CL, CO2, PHOS, BUN, CREATININE in the last 72 hours. Invalid input(s): CA  No results for input(s): AST, ALT, ALB, BILIDIR, BILITOT, ALKPHOS in the last 72 hours. No results for input(s): LIPASE, AMYLASE in the last 72 hours. No results for input(s): PROTIME, INR in the last 72 hours. No results for input(s): PTT in the last 72 hours. No results for input(s): OCCULTBLD in the last 72 hours.   CEA:  No results found for: CEA  Ca 125:  No results found for:   Ca 19-9:  No results found for:   Ca 15-3:  No results found for:

## 2018-10-05 NOTE — CONSULTS
good granulation tissue and healing well. RLE had AT/PT signals and LLE has PT/DP signals  CNS: Moves all extremities, no gross focal motor deficits    Labs:   Lab Results   Component Value Date    WBC 12.9 06/05/2018    HGB 13.4 06/05/2018    HCT 40.0 06/05/2018    MCV 95.8 06/05/2018     06/05/2018     Lab Results   Component Value Date     04/30/2018    K 4.4 04/30/2018     04/30/2018    CO2 23 04/30/2018    BUN 18 06/05/2018    CREATININE 1.08 06/05/2018    GLUCOSE 81 04/30/2018    CALCIUM 9.3 04/30/2018     Lab Results   Component Value Date    INR 1.1 06/05/2018         Impression:    1. RLE PAD  2. Arterial insufficiency ulcer of right anterior leg    Recommendation:    1. Recommend continue Plavix and aspirin  2. Will obtain non invasive vascular studies - OLESYA/PVR  3. Neurovascular checks q 4 hours  4. Medical management per primary team    Plan discussed with MD Raisa Roger D.O.   Surgery Department  10/5/2018

## 2018-10-05 NOTE — H&P
Admission History and Physical  Podiatric Medicine and Surgery     Subjective     Chief Complaint: RLE ischemic changes    HPI:  Mark Nye is a 51 y.o. male directly admitted from [facility] [address] for acute ischemic changes of the right LE. Patient presented for follow-up of right leg arterial ulceration but noted recent cyanotic changes to digits. Patient is known to Dr. Felipe Pagan for previous bilateral LE angioplasties with stents. PCP is Soni Lr MD    ROS:   Review of Systems    Past Medical History   has a past medical history of CAD (coronary artery disease); COPD (chronic obstructive pulmonary disease) ([facility] [address] Hypertension; MRSA (methicillin resistant staph aureus) culture positive; and Peripheral vascular disease ([facility] [address] Past Surgical History   has a past surgical history that includes Coronary angioplasty with stent. Medications  Prior to Admission medications    Medication Sig Start Date End Date Taking?  Authorizing Provider   Umeclidinium Bromide (INCRUSE ELLIPTA) 62.5 MCG/INH AEPB Inhale 1 puff into the lungs daily 9/21/18   Soni Lr MD   clopidogrel (PLAVIX) 75 MG tablet Take 1 tablet by mouth daily 9/14/18   Soni Lr MD   atorvastatin (LIPITOR) 40 MG tablet Take 1 tablet by mouth daily 9/14/18   Soni Lr MD   amLODIPine (NORVASC) 5 MG tablet Take 1 tablet by mouth daily 9/14/18   Soni Lr MD   metoprolol tartrate (LOPRESSOR) 25 MG tablet Take 1 tablet by mouth 2 times daily 9/14/18   Soni Lr MD   mometasone-formoterol [facility]) 200-5 MCG/ACT inhaler Inhale 2 puffs into the lungs every 12 hours 9/14/18   Soni Lr MD   albuterol sulfate HFA (PROAIR HFA) 108 (90 Base) MCG/ACT inhaler Inhale 2 puffs into the lungs every 6 hours as needed for Wheezing 9/14/18   Soni Lr MD   tiotropium (H[facility]ctor Deist) 18 MCG inhalation capsule Inhale 1 capsule into the lungs daily 9/14/18   Soni Lr MD

## 2018-10-06 VITALS
HEIGHT: 74 IN | OXYGEN SATURATION: 97 % | BODY MASS INDEX: 19.51 KG/M2 | TEMPERATURE: 97.7 F | DIASTOLIC BLOOD PRESSURE: 75 MMHG | SYSTOLIC BLOOD PRESSURE: 129 MMHG | WEIGHT: 152 LBS | HEART RATE: 63 BPM | RESPIRATION RATE: 18 BRPM

## 2018-10-06 LAB
ANION GAP SERPL CALCULATED.3IONS-SCNC: 13 MMOL/L (ref 9–17)
BUN BLDV-MCNC: 16 MG/DL (ref 6–20)
BUN/CREAT BLD: ABNORMAL (ref 9–20)
CALCIUM SERPL-MCNC: 9.2 MG/DL (ref 8.6–10.4)
CHLORIDE BLD-SCNC: 105 MMOL/L (ref 98–107)
CO2: 23 MMOL/L (ref 20–31)
CREAT SERPL-MCNC: 1.3 MG/DL (ref 0.7–1.2)
GFR AFRICAN AMERICAN: >60 ML/MIN
GFR NON-AFRICAN AMERICAN: 59 ML/MIN
GFR SERPL CREATININE-BSD FRML MDRD: ABNORMAL ML/MIN/{1.73_M2}
GFR SERPL CREATININE-BSD FRML MDRD: ABNORMAL ML/MIN/{1.73_M2}
GLUCOSE BLD-MCNC: 87 MG/DL (ref 70–99)
HCT VFR BLD CALC: 49.6 % (ref 40.7–50.3)
HEMOGLOBIN: 15.6 G/DL (ref 13–17)
INR BLD: 1
MCH RBC QN AUTO: 28.8 PG (ref 25.2–33.5)
MCHC RBC AUTO-ENTMCNC: 31.5 G/DL (ref 28.4–34.8)
MCV RBC AUTO: 91.5 FL (ref 82.6–102.9)
NRBC AUTOMATED: 0 PER 100 WBC
PARTIAL THROMBOPLASTIN TIME: 29.3 SEC (ref 20.5–30.5)
PDW BLD-RTO: 13.8 % (ref 11.8–14.4)
PLATELET # BLD: 755 K/UL (ref 138–453)
PMV BLD AUTO: 9.6 FL (ref 8.1–13.5)
POTASSIUM SERPL-SCNC: 4.6 MMOL/L (ref 3.7–5.3)
PROTHROMBIN TIME: 10.7 SEC (ref 9–12)
RBC # BLD: 5.42 M/UL (ref 4.21–5.77)
SODIUM BLD-SCNC: 141 MMOL/L (ref 135–144)
WBC # BLD: 11.7 K/UL (ref 3.5–11.3)

## 2018-10-06 PROCEDURE — 2580000003 HC RX 258: Performed by: STUDENT IN AN ORGANIZED HEALTH CARE EDUCATION/TRAINING PROGRAM

## 2018-10-06 PROCEDURE — G0378 HOSPITAL OBSERVATION PER HR: HCPCS

## 2018-10-06 PROCEDURE — 36415 COLL VENOUS BLD VENIPUNCTURE: CPT

## 2018-10-06 PROCEDURE — 80048 BASIC METABOLIC PNL TOTAL CA: CPT

## 2018-10-06 PROCEDURE — 85027 COMPLETE CBC AUTOMATED: CPT

## 2018-10-06 PROCEDURE — 94664 DEMO&/EVAL PT USE INHALER: CPT

## 2018-10-06 PROCEDURE — 6370000000 HC RX 637 (ALT 250 FOR IP): Performed by: STUDENT IN AN ORGANIZED HEALTH CARE EDUCATION/TRAINING PROGRAM

## 2018-10-06 PROCEDURE — 85610 PROTHROMBIN TIME: CPT

## 2018-10-06 PROCEDURE — 85730 THROMBOPLASTIN TIME PARTIAL: CPT

## 2018-10-06 PROCEDURE — 99254 IP/OBS CNSLTJ NEW/EST MOD 60: CPT | Performed by: INTERNAL MEDICINE

## 2018-10-06 PROCEDURE — 94640 AIRWAY INHALATION TREATMENT: CPT

## 2018-10-06 RX ORDER — SODIUM CHLORIDE 9 MG/ML
INJECTION, SOLUTION INTRAVENOUS CONTINUOUS
Status: DISCONTINUED | OUTPATIENT
Start: 2018-10-06 | End: 2018-10-06 | Stop reason: HOSPADM

## 2018-10-06 RX ADMIN — CLOPIDOGREL 75 MG: 75 TABLET, FILM COATED ORAL at 09:19

## 2018-10-06 RX ADMIN — SODIUM CHLORIDE: 9 INJECTION, SOLUTION INTRAVENOUS at 11:27

## 2018-10-06 RX ADMIN — TIOTROPIUM BROMIDE 18 MCG: 18 CAPSULE ORAL; RESPIRATORY (INHALATION) at 16:54

## 2018-10-06 RX ADMIN — DESMOPRESSIN ACETATE 40 MG: 0.2 TABLET ORAL at 09:19

## 2018-10-06 RX ADMIN — MOMETASONE FUROATE AND FORMOTEROL FUMARATE DIHYDRATE 2 PUFF: 200; 5 AEROSOL RESPIRATORY (INHALATION) at 16:54

## 2018-10-06 RX ADMIN — AMLODIPINE BESYLATE 5 MG: 5 TABLET ORAL at 09:19

## 2018-10-06 RX ADMIN — ASPIRIN 81 MG: 81 TABLET ORAL at 09:19

## 2018-10-06 RX ADMIN — METOPROLOL TARTRATE 25 MG: 25 TABLET ORAL at 09:19

## 2018-10-06 RX ADMIN — SODIUM CHLORIDE: 9 INJECTION, SOLUTION INTRAVENOUS at 11:11

## 2018-10-09 ENCOUNTER — TELEPHONE (OUTPATIENT)
Dept: INTERNAL MEDICINE | Age: 48
End: 2018-10-09

## 2018-10-10 NOTE — DISCHARGE SUMMARY
Discharge Summary  Podiatric Medicine and Surgery    Patient Identification     Mark Nye is a 51 y.o. male  :  1970  MRN: [id number]  Acct: [de-identified]     Admit date: 10/5/2018  Discharge date and time: 10/6/2018  6:55 PM     Admitting Physician: Yolis Rivas DPM   Discharge Physician: Helen Roberts     Admission Diagnoses: Ischemia of right lower extremity [I99.8]  Discharge Diagnoses:   Patient Active Problem List   Diagnosis    Essential hypertension    [facility]peripheral vascular disease) [facility] [address]    Stage 3 chronic kidney disease ([facility] [address]    Closed nondisplaced fracture of third metatarsal bone of left foot    Ischemia of toe    Critical lower limb ischemia    Gangrene of toe of left foot ([facility] [address]    Bandemia    Thrombocytosis (HCC)    Moderate malnutrition (HCC)    Pain of lower extremity    Smoking    Ischemia of right lower extremity    COPD (chronic obstructive pulmonary disease) ([facility] [address]       Admission Condition: stable  Discharged Condition: [place] Course     Brief Inpatient Course: Admitted on 10/5/2018  5:25 PM for Ischemia of right lower extremity [I99.8]. Patient was directly admitted from [facility] [address] due to acute ischemic changes of the RLE. Pulses were not palpable or audible on doppler and there was cyanotic discoloration of the right foot digits. Patient has a known vascular history with previous angioplasties/stenting of the LE. Vascular Surgery was consulted and OLESYA/PVRs were obtained which showed severe PAD. Vascular Surgery was able to locate pulses on doppler and felt patient could be discharged home with outpatient angio the following week. Internal Medicine was consulted for medical management.  Creatinine was elevated above baseline on admission and IM recommended IV hydration and re-evaluation of Cr lab the next morning, however pt was adamant he was \"just dehydrated\" and requested to be discharged home stating he understood the risks but days..     Umeclidinium Bromide 62.5 MCG/INH Aepb  Commonly known as:  INCRUSE ELLIPTA  Inhale 1 puff into the lungs daily     varenicline 1 MG tablet  Commonly known as:  CHANTIX CONTINUING MONTH DHRUV  Take 1 tablet by mouth 2 times daily        * This list has 3 medication(s) that are the same as other medications prescribed for you. Read the directions carefully, and ask your doctor or other care provider to review them with you. STOP taking these medications    sodium chloride flush 0.9 % injection            Activity:  activity as tolerated    Diet: The patient is asked to make an attempt to improve diet and exercise patterns to aid in medical management of this problem. Disposition: home    Wound Care: Daily right leg wound dressing change with 4x4/kerlix/light ace    Follow-up: No Follow-up on file.   Dr. Lazaro Dorantes ASAP, [facility] POD in 1 wk, PCP ASAP for BMP f/u    Tanner Mccullough, [address]

## 2018-10-12 ENCOUNTER — OFFICE VISIT (OUTPATIENT)
Dept: PODIATRY | Age: 48
End: 2018-10-12
Payer: COMMERCIAL

## 2018-10-12 VITALS
SYSTOLIC BLOOD PRESSURE: 117 MMHG | HEIGHT: 74 IN | BODY MASS INDEX: 19.07 KG/M2 | HEART RATE: 63 BPM | WEIGHT: 148.6 LBS | DIASTOLIC BLOOD PRESSURE: 77 MMHG

## 2018-10-12 DIAGNOSIS — L98.499 ARTERIAL INSUFFICIENCY WITH ISCHEMIC ULCER (HCC): ICD-10-CM

## 2018-10-12 DIAGNOSIS — Z98.62 HISTORY OF ANGIOPLASTY: ICD-10-CM

## 2018-10-12 DIAGNOSIS — I73.9 PERIPHERAL ARTERIAL DISEASE (HCC): Primary | ICD-10-CM

## 2018-10-12 DIAGNOSIS — I70.229 CRITICAL LOWER LIMB ISCHEMIA (HCC): ICD-10-CM

## 2018-10-12 DIAGNOSIS — G62.9 PERIPHERAL POLYNEUROPATHY: ICD-10-CM

## 2018-10-12 DIAGNOSIS — I77.1 ARTERIAL INSUFFICIENCY WITH ISCHEMIC ULCER (HCC): ICD-10-CM

## 2018-10-12 PROCEDURE — 11055 PARING/CUTG B9 HYPRKER LES 1: CPT | Performed by: STUDENT IN AN ORGANIZED HEALTH CARE EDUCATION/TRAINING PROGRAM

## 2018-10-12 PROCEDURE — 1111F DSCHRG MED/CURRENT MED MERGE: CPT | Performed by: STUDENT IN AN ORGANIZED HEALTH CARE EDUCATION/TRAINING PROGRAM

## 2018-10-12 PROCEDURE — G8420 CALC BMI NORM PARAMETERS: HCPCS | Performed by: STUDENT IN AN ORGANIZED HEALTH CARE EDUCATION/TRAINING PROGRAM

## 2018-10-12 PROCEDURE — 99212 OFFICE O/P EST SF 10 MIN: CPT | Performed by: STUDENT IN AN ORGANIZED HEALTH CARE EDUCATION/TRAINING PROGRAM

## 2018-10-12 PROCEDURE — G8484 FLU IMMUNIZE NO ADMIN: HCPCS | Performed by: STUDENT IN AN ORGANIZED HEALTH CARE EDUCATION/TRAINING PROGRAM

## 2018-10-12 PROCEDURE — G8428 CUR MEDS NOT DOCUMENT: HCPCS | Performed by: STUDENT IN AN ORGANIZED HEALTH CARE EDUCATION/TRAINING PROGRAM

## 2018-10-12 PROCEDURE — 97597 DBRDMT OPN WND 1ST 20 CM/<: CPT | Performed by: STUDENT IN AN ORGANIZED HEALTH CARE EDUCATION/TRAINING PROGRAM

## 2018-10-12 PROCEDURE — 1036F TOBACCO NON-USER: CPT | Performed by: STUDENT IN AN ORGANIZED HEALTH CARE EDUCATION/TRAINING PROGRAM

## 2018-10-12 RX ORDER — DEXTROSE AND SODIUM CHLORIDE 5; .45 G/100ML; G/100ML
INJECTION, SOLUTION INTRAVENOUS CONTINUOUS
Status: CANCELLED | OUTPATIENT
Start: 2018-10-15

## 2018-10-12 RX ORDER — IPRATROPIUM/ALBUTEROL SULFATE 20-100 MCG
MIST INHALER (GRAM) INHALATION
COMMUNITY
Start: 2018-10-09 | End: 2019-02-06

## 2018-10-12 NOTE — PROGRESS NOTES
mouth 2 times daily 9/14/18   Lilliam Kessler MD   mometasone-formoterol [facility]) 200-5 MCG/ACT inhaler Inhale 2 puffs into the lungs every 12 hours 9/14/18   Lilliam Kessler MD   albuterol sulfate HFA (PROAIR HFA) 108 (90 Base) MCG/ACT inhaler Inhale 2 puffs into the lungs every 6 hours as needed for Wheezing 9/14/18   Lilliam Kessler MD   tiotropium (Telma [place]) 18 MCG inhalation capsule Inhale 1 capsule into the lungs daily 9/14/18   Lilliam Kessler MD   Gauze Pads & Dressings (KERLIX GAUZE ROLL LARGE) MISC 30 kerlix rolls for daily dressing changes 9/5/18   Catalina Horn DPM   Gauze Pads & Dressings (GAUZE DRESSING) 4\"X4\" PADS Please dispense 100 gauze pads for patient's daily dressing changes 9/5/18   Catalina Horn DPM   Gauze Pads & Dressings (Denisa Gaucher ROLL SMALL) MISC Apply to foot 8/24/18   Hai Cortes MD   sodium chloride (OCEAN NASAL SPRAY) 0.65 % nasal spray 1 spray by Nasal route as needed for Congestion 8/8/18   Hai Cortes MD   aspirin 81 MG tablet Take 81 mg by mouth daily    Historical Provider, MD   varenicline (CHANTIX CONTINUING MONTH DHRUV) 1 MG tablet Take 1 tablet by mouth 2 times daily 5/18/18   Giles Smith MD   traMADol (ULTRAM) 50 MG tablet Take 1 tablet by mouth every 6 hours as needed for Pain for up to 7 days. . 5/2/18 5/9/18  Jorge Crowe DPM       Objective     Vitals:    10/12/18 1532   BP: 117/77   Pulse: 63       Lab Results   Component Value Date    LABA1C 4.9 08/08/2018       Physical Exam:  General:  Alert and oriented x3. In no acute distress. Lower Extremity Physical Exam:    Vascular: DP and PT pulses are non-palpable and non-dopplerable, RLE.  CFT >5 seconds to all digits, Bilateral. Distal digits with blue colored discoloration and cool to the touch. Neuro: Saph/sural/SP/DP/plantar sensation diminished to light touch.  Protective sensation is intact to 7/10 sites as tested with a 5.07g SWMF, Bilateral.     Musculoskeletal: family history as documented unless otherwise noted below. Documentation of the HPI, Physical Exam and Medical Decision Making performed by medical students or scribes is based on my personal performance of the HPI, PE and MDM. I have personally evaluated this patient and have completed at least one if not all key elements of the E/M (history, physical exam, and MDM). Additional findings are as noted. Ranker, Chase,D.P.M.

## 2018-10-15 ENCOUNTER — HOSPITAL ENCOUNTER (OUTPATIENT)
Dept: CARDIAC CATH/INVASIVE PROCEDURES | Age: 48
Discharge: HOME | End: 2018-10-15
Payer: COMMERCIAL

## 2018-10-19 ENCOUNTER — OFFICE VISIT (OUTPATIENT)
Dept: PODIATRY | Age: 48
End: 2018-10-19
Payer: COMMERCIAL

## 2018-10-19 VITALS
WEIGHT: 146 LBS | BODY MASS INDEX: 18.74 KG/M2 | HEIGHT: 74 IN | DIASTOLIC BLOOD PRESSURE: 78 MMHG | HEART RATE: 62 BPM | SYSTOLIC BLOOD PRESSURE: 114 MMHG

## 2018-10-19 DIAGNOSIS — I77.1 ARTERIAL INSUFFICIENCY WITH ISCHEMIC ULCER (HCC): Primary | ICD-10-CM

## 2018-10-19 DIAGNOSIS — G62.9 PERIPHERAL POLYNEUROPATHY: ICD-10-CM

## 2018-10-19 DIAGNOSIS — I70.229 CRITICAL ISCHEMIA OF LOWER EXTREMITY (HCC): ICD-10-CM

## 2018-10-19 DIAGNOSIS — L98.499 ARTERIAL INSUFFICIENCY WITH ISCHEMIC ULCER (HCC): Primary | ICD-10-CM

## 2018-10-19 DIAGNOSIS — I73.9 PAD (PERIPHERAL ARTERY DISEASE) (HCC): ICD-10-CM

## 2018-10-19 PROCEDURE — 99213 OFFICE O/P EST LOW 20 MIN: CPT | Performed by: STUDENT IN AN ORGANIZED HEALTH CARE EDUCATION/TRAINING PROGRAM

## 2018-10-19 PROCEDURE — 1111F DSCHRG MED/CURRENT MED MERGE: CPT | Performed by: STUDENT IN AN ORGANIZED HEALTH CARE EDUCATION/TRAINING PROGRAM

## 2018-10-19 PROCEDURE — G8420 CALC BMI NORM PARAMETERS: HCPCS | Performed by: STUDENT IN AN ORGANIZED HEALTH CARE EDUCATION/TRAINING PROGRAM

## 2018-10-19 PROCEDURE — G8484 FLU IMMUNIZE NO ADMIN: HCPCS | Performed by: STUDENT IN AN ORGANIZED HEALTH CARE EDUCATION/TRAINING PROGRAM

## 2018-10-19 PROCEDURE — 99212 OFFICE O/P EST SF 10 MIN: CPT | Performed by: STUDENT IN AN ORGANIZED HEALTH CARE EDUCATION/TRAINING PROGRAM

## 2018-10-19 PROCEDURE — 1036F TOBACCO NON-USER: CPT | Performed by: STUDENT IN AN ORGANIZED HEALTH CARE EDUCATION/TRAINING PROGRAM

## 2018-10-19 PROCEDURE — G8427 DOCREV CUR MEDS BY ELIG CLIN: HCPCS | Performed by: STUDENT IN AN ORGANIZED HEALTH CARE EDUCATION/TRAINING PROGRAM

## 2018-10-19 NOTE — PROGRESS NOTES
apply epifix currently due to vascular status  -Dry stable eschar with no signs of infection noted, instructed pt to use adhesive bandage to cover wound and keep clean dry and intact to monitor for signs of infection. Instructed pt to report to ED if signs of infection arise.  -Instructed pt to monitor for signs of infection  -F/u in 1 wk     Fred Blanchard PGY1      I performed a history and physical examination of the patient and discussed management with the resident. I reviewed the residents note and agree with the documented findings and plan of care. Any areas of disagreement are noted on the chart. I was personally present for the key portions of any procedures. I have documented in the chart those procedures where I was not present during the key portions. I have reviewed the Podiatry Resident progress note. I agree with the chief complaint, past medical history, past surgical history, allergies, medications, social and family history as documented unless otherwise noted below. Documentation of the HPI, Physical Exam and Medical Decision Making performed by medical students or scribes is based on my personal performance of the HPI, PE and MDM. I have personally evaluated this patient and have completed at least one if not all key elements of the E/M (history, physical exam, and MDM). Additional findings are as noted.      Electronically signed by Laura Muhammad DPM on 10/22/2018 at 8:44 AM

## 2018-10-26 ENCOUNTER — HOSPITAL ENCOUNTER (OUTPATIENT)
Dept: CARDIAC CATH/INVASIVE PROCEDURES | Age: 48
Discharge: HOME | End: 2018-10-26
Attending: SURGERY | Admitting: SURGERY
Payer: COMMERCIAL

## 2018-10-26 VITALS
HEIGHT: 74 IN | SYSTOLIC BLOOD PRESSURE: 161 MMHG | HEART RATE: 62 BPM | OXYGEN SATURATION: 96 % | TEMPERATURE: 97.9 F | BODY MASS INDEX: 18.68 KG/M2 | RESPIRATION RATE: 14 BRPM | DIASTOLIC BLOOD PRESSURE: 91 MMHG | WEIGHT: 145.6 LBS

## 2018-10-26 LAB
ANION GAP SERPL CALCULATED.3IONS-SCNC: 14 MMOL/L (ref 9–17)
BUN BLDV-MCNC: 16 MG/DL (ref 6–20)
BUN/CREAT BLD: 15 (ref 9–20)
CALCIUM SERPL-MCNC: 10 MG/DL (ref 8.6–10.4)
CHLORIDE BLD-SCNC: 104 MMOL/L (ref 98–107)
CO2: 22 MMOL/L (ref 20–31)
CREAT SERPL-MCNC: 1.05 MG/DL (ref 0.7–1.2)
GFR AFRICAN AMERICAN: >60 ML/MIN
GFR NON-AFRICAN AMERICAN: >60 ML/MIN
GFR SERPL CREATININE-BSD FRML MDRD: NORMAL ML/MIN/{1.73_M2}
GFR SERPL CREATININE-BSD FRML MDRD: NORMAL ML/MIN/{1.73_M2}
GLUCOSE BLD-MCNC: 85 MG/DL (ref 70–99)
HCT VFR BLD CALC: 50.9 % (ref 41–53)
HEMOGLOBIN: 17.2 G/DL (ref 13.5–17.5)
INR BLD: 1.1
MCH RBC QN AUTO: 28.4 PG (ref 26–34)
MCHC RBC AUTO-ENTMCNC: 33.8 G/DL (ref 31–37)
MCV RBC AUTO: 84 FL (ref 80–100)
NRBC AUTOMATED: ABNORMAL PER 100 WBC
PARTIAL THROMBOPLASTIN TIME: 32.9 SEC (ref 23–31)
PDW BLD-RTO: 14.8 % (ref 11.5–14.5)
PLATELET # BLD: 1128 K/UL (ref 130–400)
PMV BLD AUTO: 7.4 FL (ref 6–12)
POTASSIUM SERPL-SCNC: 5 MMOL/L (ref 3.7–5.3)
PROTHROMBIN TIME: 11.7 SEC (ref 9.7–11.6)
RBC # BLD: 6.06 M/UL (ref 4.5–5.9)
SODIUM BLD-SCNC: 140 MMOL/L (ref 135–144)
WBC # BLD: 14.1 K/UL (ref 3.5–11)

## 2018-10-26 PROCEDURE — 7100000010 HC PHASE II RECOVERY - FIRST 15 MIN

## 2018-10-26 PROCEDURE — 36140 INTRO NDL ICATH UPR/LXTR ART: CPT | Performed by: SURGERY

## 2018-10-26 PROCEDURE — 2580000003 HC RX 258: Performed by: SURGERY

## 2018-10-26 PROCEDURE — 6360000004 HC RX CONTRAST MEDICATION

## 2018-10-26 PROCEDURE — C1769 GUIDE WIRE: HCPCS

## 2018-10-26 PROCEDURE — 36200 PLACE CATHETER IN AORTA: CPT | Performed by: SURGERY

## 2018-10-26 PROCEDURE — 75716 ARTERY X-RAYS ARMS/LEGS: CPT | Performed by: SURGERY

## 2018-10-26 PROCEDURE — C1760 CLOSURE DEV, VASC: HCPCS

## 2018-10-26 PROCEDURE — 85730 THROMBOPLASTIN TIME PARTIAL: CPT

## 2018-10-26 PROCEDURE — 85027 COMPLETE CBC AUTOMATED: CPT

## 2018-10-26 PROCEDURE — 80048 BASIC METABOLIC PNL TOTAL CA: CPT

## 2018-10-26 PROCEDURE — 85610 PROTHROMBIN TIME: CPT

## 2018-10-26 PROCEDURE — 2500000003 HC RX 250 WO HCPCS

## 2018-10-26 PROCEDURE — 6360000002 HC RX W HCPCS

## 2018-10-26 PROCEDURE — 7100000011 HC PHASE II RECOVERY - ADDTL 15 MIN

## 2018-10-26 PROCEDURE — C1725 CATH, TRANSLUMIN NON-LASER: HCPCS

## 2018-10-26 PROCEDURE — C1894 INTRO/SHEATH, NON-LASER: HCPCS

## 2018-10-26 RX ORDER — DEXTROSE AND SODIUM CHLORIDE 5; .45 G/100ML; G/100ML
INJECTION, SOLUTION INTRAVENOUS CONTINUOUS
Status: DISCONTINUED | OUTPATIENT
Start: 2018-10-26 | End: 2018-10-30 | Stop reason: HOSPADM

## 2018-10-26 RX ADMIN — DEXTROSE AND SODIUM CHLORIDE: 5; 450 INJECTION, SOLUTION INTRAVENOUS at 12:17

## 2018-10-26 ASSESSMENT — PAIN DESCRIPTION - LOCATION: LOCATION: GROIN

## 2018-10-26 ASSESSMENT — PAIN SCALES - GENERAL
PAINLEVEL_OUTOF10: 0
PAINLEVEL_OUTOF10: 4
PAINLEVEL_OUTOF10: 0
PAINLEVEL_OUTOF10: 4
PAINLEVEL_OUTOF10: 0
PAINLEVEL_OUTOF10: 4
PAINLEVEL_OUTOF10: 0
PAINLEVEL_OUTOF10: 0
PAINLEVEL_OUTOF10: 4
PAINLEVEL_OUTOF10: 0

## 2018-10-26 ASSESSMENT — PAIN DESCRIPTION - DESCRIPTORS: DESCRIPTORS: SORE

## 2018-10-26 ASSESSMENT — PAIN - FUNCTIONAL ASSESSMENT: PAIN_FUNCTIONAL_ASSESSMENT: 0-10

## 2018-10-26 ASSESSMENT — PAIN DESCRIPTION - ORIENTATION: ORIENTATION: LEFT

## 2018-10-26 NOTE — H&P
History and Physical Update    Pt Name: Taiwo Stoner  MRN: [id number]  YOB: 1970  Date of evaluation: 10/26/2018    SHANTELL ÁLVAREZ PRESENTS TODAY FOR AN ARTERIOGRAM WITH LOWER EXTREMITY RUNOFF FOR EVALUATION OF LOWER EXTREMITY  CLAUDICATION/PERIPHERAL VASCULAR DISEASE. .   [x] I have reviewed the H&P  OF 10/5/18    which meets the criteria for an Interval History and Physical note [x] I have examined  Taiwo Stoner  There are no changes to the patient who is scheduled for an ANGIOGRAM   The patient denies health changes, fever, chills, productive cough, SOB, chest pain, open sores or wounds. Vital signs: BP (!) 156/107   Pulse 71   Temp 97.7 °F (36.5 °C) (Oral)   Resp 16   Ht 6' 2\" (1.88 m)   Wt 145 lb 9.6 oz (66 kg)   SpO2 100%   BMI 18.69 kg/m²     Allergies:  Patient has no known allergies. Medications:    Prior to Admission medications    Medication Sig Start Date End Date Taking?  Authorizing Provider   atorvastatin (LIPITOR) 40 MG tablet Take 1 tablet by mouth daily 9/14/18  Yes Tara Vincent MD   amLODIPine (NORVASC) 5 MG tablet Take 1 tablet by mouth daily 9/14/18  Yes Tara Vincent MD   metoprolol tartrate (LOPRESSOR) 25 MG tablet Take 1 tablet by mouth 2 times daily 9/14/18  Yes Tara Vincent MD   albuterol sulfate HFA (PROAIR HFA) 108 (90 Base) MCG/ACT inhaler Inhale 2 puffs into the lungs every 6 hours as needed for Wheezing 9/14/18  Yes Tara Vincent MD   aspirin 81 MG tablet Take 81 mg by mouth daily   Yes Historical Provider, MD   varenicline (CHANTIX CONTINUING MONTH PAK) 1 MG tablet Take 1 tablet by mouth 2 times daily 5/18/18  Yes Tara Vincent MD   COMBIVENT RESPIMAT  MCG/ACT AERS inhaler  10/9/18   Historical Provider, MD   Umeclidinium Bromide (INCRUSE ELLIPTA) 62.5 MCG/INH AEPB Inhale 1 puff into the lungs daily 9/21/18   Tara Vincent MD   clopidogrel (PLAVIX) 75 MG tablet Take 1 tablet by mouth daily 9/14/18   Tara Vincent MD

## 2018-10-26 NOTE — OP NOTE
Operative Note  ______________________________________________________________    Patient: Baron Prasad  YOB: 1970  MRN: [id number]  Date of Procedure: 10/26/2018    Pre-Op Diagnosis: ischemic ulcer right leg  Post-Op Diagnosis: Same       Procedure:  1)  US guided access bilateral CFA  2)  Aortogram with pelvic runoff    Anesthesia:   Versed 1mg  Fentanyl 50mcg    Surgeon:  Kael Ortiz MD    Staff:      Estimated Blood Loss: 5 mL    Complications: None    Specimens:   * No specimens in log *    Implants:  * No implants in log *      Drains:      Findings:   1)  Thrombosed right common iliac, internal iliac, and external iliac arteries. Profunda femoris artery was not visualized on the right. 2)  Left common iliac stent, internal, and external iliac arteries are widely patent providing cross pelvic flow to the right leg. Indications and description of operative procedure: This is a 43-year-old male who initially presented with right leg claudication. He lives in [place] several months prior to this and had bilateral iliac stenting. Was noted that his right and left common iliac stent extended into the distal aorta and were crossed. In the past his right leg stent was run both and it was really opened twice. Reasons seen by podiatry noted to have short distance claudication and non-palpable pulses in the right leg. He had a OLESYA PVR study showing right OLESYA of 0.5 suggesting aortoiliac occlusive disease. He is taken to the cath lab and placed supine in the groins are clean and prepped in usual fashion Steri-Strips were applied. Dynamic duplex ultrasound was used to identify the right common femoral artery. Skin was locally anesthetized with lidocaine and like machine was used to access the common femoral artery. Guidewire could not pass easily and actually made a sharp turn into the SFA. Second attempt was made with the micropuncture sheath and there was no blood return.   The

## 2018-11-08 ENCOUNTER — TELEPHONE (OUTPATIENT)
Dept: INTERNAL MEDICINE | Age: 48
End: 2018-11-08

## 2018-11-27 ENCOUNTER — TELEPHONE (OUTPATIENT)
Dept: INTERNAL MEDICINE | Age: 48
End: 2018-11-27

## 2018-11-27 ENCOUNTER — HOSPITAL ENCOUNTER (OUTPATIENT)
Dept: PREADMISSION TESTING | Age: 48
Discharge: HOME | End: 2018-12-01
Payer: COMMERCIAL

## 2018-11-27 VITALS
SYSTOLIC BLOOD PRESSURE: 139 MMHG | WEIGHT: 137.13 LBS | RESPIRATION RATE: 20 BRPM | HEIGHT: 74 IN | DIASTOLIC BLOOD PRESSURE: 91 MMHG | HEART RATE: 66 BPM | BODY MASS INDEX: 17.6 KG/M2 | OXYGEN SATURATION: 99 %

## 2018-11-27 LAB
-: NORMAL
ALBUMIN SERPL-MCNC: 5.1 G/DL (ref 3.5–5.2)
ALBUMIN/GLOBULIN RATIO: ABNORMAL (ref 1–2.5)
ALP BLD-CCNC: 138 U/L (ref 40–129)
ALT SERPL-CCNC: 46 U/L (ref 5–41)
AMORPHOUS: NORMAL
ANION GAP SERPL CALCULATED.3IONS-SCNC: 19 MMOL/L (ref 9–17)
AST SERPL-CCNC: 30 U/L
BACTERIA: NORMAL
BILIRUB SERPL-MCNC: 0.97 MG/DL (ref 0.3–1.2)
BILIRUBIN URINE: NEGATIVE
BUN BLDV-MCNC: 24 MG/DL (ref 6–20)
BUN/CREAT BLD: 15 (ref 9–20)
CALCIUM SERPL-MCNC: 10.6 MG/DL (ref 8.6–10.4)
CASTS UA: NORMAL /LPF
CHLORIDE BLD-SCNC: 97 MMOL/L (ref 98–107)
CO2: 21 MMOL/L (ref 20–31)
COLOR: YELLOW
COMMENT UA: ABNORMAL
CREAT SERPL-MCNC: 1.57 MG/DL (ref 0.7–1.2)
CRYSTALS, UA: NORMAL /HPF
EKG ATRIAL RATE: 68 BPM
EKG P AXIS: 76 DEGREES
EKG P-R INTERVAL: 134 MS
EKG Q-T INTERVAL: 380 MS
EKG QRS DURATION: 98 MS
EKG QTC CALCULATION (BAZETT): 404 MS
EKG R AXIS: 84 DEGREES
EKG T AXIS: 72 DEGREES
EKG VENTRICULAR RATE: 68 BPM
EPITHELIAL CELLS UA: NORMAL /HPF (ref 0–5)
GFR AFRICAN AMERICAN: 57 ML/MIN
GFR NON-AFRICAN AMERICAN: 47 ML/MIN
GFR SERPL CREATININE-BSD FRML MDRD: ABNORMAL ML/MIN/{1.73_M2}
GFR SERPL CREATININE-BSD FRML MDRD: ABNORMAL ML/MIN/{1.73_M2}
GLUCOSE BLD-MCNC: 87 MG/DL (ref 70–99)
GLUCOSE URINE: NEGATIVE
HCT VFR BLD CALC: 54.7 % (ref 41–53)
HEMOGLOBIN: 18.4 G/DL (ref 13.5–17.5)
INR BLD: 1.1
KETONES, URINE: ABNORMAL
LEUKOCYTE ESTERASE, URINE: NEGATIVE
MCH RBC QN AUTO: 27.4 PG (ref 26–34)
MCHC RBC AUTO-ENTMCNC: 33.7 G/DL (ref 31–37)
MCV RBC AUTO: 81.4 FL (ref 80–100)
MUCUS: NORMAL
NITRITE, URINE: NEGATIVE
NRBC AUTOMATED: ABNORMAL PER 100 WBC
OTHER OBSERVATIONS UA: NORMAL
PARTIAL THROMBOPLASTIN TIME: 33.5 SEC (ref 23–31)
PDW BLD-RTO: 15.1 % (ref 11.5–14.5)
PH UA: 5.5 (ref 5–8)
PLATELET # BLD: 1206 K/UL (ref 130–400)
PMV BLD AUTO: 7.3 FL (ref 6–12)
POTASSIUM SERPL-SCNC: 4.9 MMOL/L (ref 3.7–5.3)
PROTEIN UA: ABNORMAL
PROTHROMBIN TIME: 11.6 SEC (ref 9.7–11.6)
RBC # BLD: 6.71 M/UL (ref 4.5–5.9)
RBC UA: NORMAL /HPF (ref 0–2)
RENAL EPITHELIAL, UA: NORMAL /HPF
SODIUM BLD-SCNC: 137 MMOL/L (ref 135–144)
SPECIFIC GRAVITY UA: 1.02 (ref 1–1.03)
TOTAL PROTEIN: 9 G/DL (ref 6.4–8.3)
TRICHOMONAS: NORMAL
TURBIDITY: ABNORMAL
URINE HGB: NEGATIVE
UROBILINOGEN, URINE: NORMAL
WBC # BLD: 16.1 K/UL (ref 3.5–11)
WBC UA: NORMAL /HPF (ref 0–5)
YEAST: NORMAL

## 2018-11-27 PROCEDURE — 86920 COMPATIBILITY TEST SPIN: CPT

## 2018-11-27 PROCEDURE — 86901 BLOOD TYPING SEROLOGIC RH(D): CPT

## 2018-11-27 PROCEDURE — 85730 THROMBOPLASTIN TIME PARTIAL: CPT

## 2018-11-27 PROCEDURE — 85027 COMPLETE CBC AUTOMATED: CPT

## 2018-11-27 PROCEDURE — 81001 URINALYSIS AUTO W/SCOPE: CPT

## 2018-11-27 PROCEDURE — 86900 BLOOD TYPING SEROLOGIC ABO: CPT

## 2018-11-27 PROCEDURE — 93005 ELECTROCARDIOGRAM TRACING: CPT

## 2018-11-27 PROCEDURE — 86850 RBC ANTIBODY SCREEN: CPT

## 2018-11-27 PROCEDURE — 80053 COMPREHEN METABOLIC PANEL: CPT

## 2018-11-27 PROCEDURE — 36415 COLL VENOUS BLD VENIPUNCTURE: CPT

## 2018-11-27 PROCEDURE — 85610 PROTHROMBIN TIME: CPT

## 2018-11-27 RX ORDER — CEFAZOLIN SODIUM 2 G/50ML
2 SOLUTION INTRAVENOUS ONCE
Status: CANCELLED | OUTPATIENT
Start: 2018-12-13

## 2018-11-27 NOTE — H&P
History and Physical Service   [place] [id number]    HISTORY AND PHYSICAL EXAMINATION            Date of Evaluation: 11/27/2018  Patient name:  David Tobar  MRN:   [id number]  YOB: 1970  PCP:    Courtney Angeles MD    History Obtained From:     Patient and girlfriend    History of Present Illness: This is David Tobar a 51 y.o. male who presents for a pre-admission testing appointment for an upcoming aorto bifemoral bypass by Dr. Aislinn Dong scheduled on 12/13/2018 at 55 due to right iliac artery occlusion. The patient's chief complaint is intermittent, 8/10 lower right leg pain which has progressively worsened over the past 1 1/2 years. The leg pain is aggravated by walking; and is minimally relieved with rest.  Pt denies taking pain medication. Pt denies numbness and tingling in the right leg. Bilateral lower leg swelling. The right shin has a healed ulcer. S/p angiogram with stents to bilateral legs in 10/2018. S/p right 5th toe amputation. Denies recent falls and injuries. Pt presents for surgical correction. History of CAD, COPD, infrequent palpitations, hypertension, stroke in 2013, CKD, MRSA in the toe 05/2018, PVD, and thrombocytosis. Pt has residual right sided weakness and difficulty \"remembering stuff\". Confusion has occurred for 5-10 minutes a couple times per week for the past year. Pt has non-radiating, daily, 5/10, sharp, left upper chest pain which occurs upon awakening in the morning and lasts for a few minutes. Episodes of chest pain have been occurring for the past 2 months. The last episode of chest pain was this morning. Pt denies history of a MI or cardiac catheterization with stent placement. Lightheadedness and dizziness occur with activity or rest, and lasts for a few minutes. Lightheadedness has been occurring daily for the past 2 months. Alert and oriented without apparent distress.   Denies current chest pain, dyspnea, Medications Prior to Admission:     Prior to Admission medications    Medication Sig Start Date End Date Taking?  Authorizing Provider   COMBIVENT RESPIMAT  MCG/ACT AERS inhaler  10/9/18   Historical Provider, MD   Umeclidinium Bromide (INCRUSE ELLIPTA) 62.5 MCG/INH AEPB Inhale 1 puff into the lungs daily 9/21/18   Giles Solano MD   clopidogrel (PLAVIX) 75 MG tablet Take 1 tablet by mouth daily 9/14/18   Audelia Klinefelter, MD   atorvastatin (LIPITOR) 40 MG tablet Take 1 tablet by mouth daily 9/14/18   Audelia Klinefelter, MD   amLODIPine (NORVASC) 5 MG tablet Take 1 tablet by mouth daily 9/14/18   Audelia Klinefelter, MD   metoprolol tartrate (LOPRESSOR) 25 MG tablet Take 1 tablet by mouth 2 times daily 9/14/18   Audelia Klinefelter, MD   mometasone-formoterol [facility]) 200-5 MCG/ACT inhaler Inhale 2 puffs into the lungs every 12 hours 9/14/18   Audelia Klinefelter, MD   albuterol sulfate HFA (PROAIR HFA) 108 (90 Base) MCG/ACT inhaler Inhale 2 puffs into the lungs every 6 hours as needed for Wheezing 9/14/18   Audelia Klinefelter, MD   tiotropium (Vinod Loveless) 18 MCG inhalation capsule Inhale 1 capsule into the lungs daily 9/14/18   Audelia Klinefelter, MD   Gauze Pads & Dressings (KERLIX GAUZE ROLL LARGE) MISC 30 kerlix rolls for daily dressing changes 9/5/18   Ovidio Henry DPM   Gauze Pads & Dressings (GAUZE DRESSING) 4\"X4\" PADS Please dispense 100 gauze pads for patient's daily dressing changes 9/5/18   Ovidio Henry DPM   Gauze Pads & Dressings (Lucia Prows ROLL SMALL) MISC Apply to foot 8/24/18   Patel Houston MD   sodium chloride (OCEAN NASAL SPRAY) 0.65 % nasal spray 1 spray by Nasal route as needed for Congestion 8/8/18   Patel Houston MD   aspirin 81 MG tablet Take 81 mg by mouth daily    Historical Provider, MD   varenicline (CHANTIX CONTINUING MONTH DHRUV) 1 MG tablet Take 1 tablet by mouth 2 times daily 5/18/18   Audelia Klinefelter, MD   traMADol (ULTRAM) 50 MG tablet Take 1 tablet by Nitrite, Urine NEGATIVE NEG    Leukocyte Esterase, Urine NEGATIVE NEG    Urinalysis Comments NOT REPORTED    Microscopic Urinalysis    Collection Time: 18  2:36 PM   Result Value Ref Range    -          WBC, UA 0 TO 2 0 - 5 /HPF    RBC, UA 0 TO 2 0 - 2 /HPF    Casts UA 0 TO 2 /LPF    Crystals UA NOT REPORTED NONE /HPF    Epithelial Cells UA 0 TO 2 0 - 5 /HPF    Renal Epithelial, Urine NOT REPORTED 0 /HPF    Bacteria, UA NOT REPORTED NONE    Mucus, UA NOT REPORTED NONE    Trichomonas, UA NOT REPORTED NONE    Amorphous, UA NOT REPORTED NONE    Other Observations UA NOT REPORTED NREQ    Yeast, UA NOT REPORTED NONE       Recent Labs      18   1412   HGB  18.4*   HCT  54.7*   WBC  16.1*   MCV  81.4   NA  137   K  4.9   CL  97*   CO2  21   BUN  24*   CREATININE  1.57*   GLUCOSE  87   INR  1.1   PROTIME  11.6   APTT  33.5*   AST  30   ALT  46*   LABALBU  5.1       Imaging/Diagnostics:    Vascular Lower Arterial Plethysmography Procedure 10/05/2018  Conclusions        Summary        Right OLESYA of 0.46 is consistent with severe arterial insufficiency and    suggestive of aortoiliac occlusive disease. Left OLESYA of 1.04 is within    normal limits. Exam is essentially unchanged from his previous study in   [facility] . [id number]. See paper chart. Diagnosis:      1. Right iliac artery occlusion    Plans:     1.  Aorto bifemoral bypass      SUSANA Rios - CNP  2018  4:33 PM

## 2018-11-27 NOTE — PRE-PROCEDURE INSTRUCTIONS
your surgery:      You will need to shower with warm water (not hot) and the CHG soap.  Use a clean wash cloth and a clean towel. Have clean clothes available to put on after the shower.    First wash your hair with regular shampoo. Rinse your hair and body thoroughly to remove the shampoo.  Wash your face and genital area (private parts) with your regular soap or water only. Thoroughly rinse your body with warm water from the neck down.  Turn water off to prevent rinsing the soap off too soon.  With a clean wet washcloth and half of the CHG soap in the bottle, lather your entire body from the neck down. Do not use CHG soap near your eyes or ears to avoid injury to those areas.  Wash thoroughly, paying special attention to the area where your surgery will be performed.  Wash your body gently for five (5) minutes. Avoid scrubbing your skin too hard.  Turn the water back on and rinse your body thoroughly.  Pat yourself dry with a clean, soft towel. Do not apply lotion, cream or powder.  Dress with clean freshly washed clothes. The morning of surgery:     Repeat shower following steps above - using remaining half of CHG soap in bottle. Patient Instructions:    [facility] If you are having any type of anesthesia you are to have nothing to eat or drink after midnight the night before your surgery. This includes gum, hard candy, mints, water or smoking or chewing tobacco.  The only exception to this is a small sip of water to take with any morning dose of heart, blood pressure, or seizure medications. No alcoholic beverages for 24 hours prior to surgery.  Brush your teeth but do not swallow water.  Bring your eyeglasses and case with you. No contacts are to be worn the day of surgery. You also may bring your hearing aids.      If you are on C-PAP or Bi-PAP at home and plan on staying in the hospital overnight for your surgery please bring the machine with you.    · Do not wear any jewelry or body piercings day of surgery. Also, NO lotion, perfume or deodorant to be used the day of surgery. No nail polish on the operative extremity (arm/leg surgeries)    · Do not bring any valuables such as jewelry, cash, or credit cards. If you are staying overnight with us, please bring a small bag of personal items.  Please wear loose, comfortable clothing. If you are potentially going to have a cast or brace bring clothing that will fit over them.  In case of illness - If you have cold or flu like symptoms (high fever, runny nose, sore throat, cough, etc.) rash, nausea, vomiting, loose stools, and/or recent contact with someone who has a contagious disease (chicken pox, measles, etc.) Please call your doctor before coming to the hospital.     If your child is having surgery please make arrangements for any other children to be cared for at home on the day of surgery. Other children are not permitted in recovery room and we want you to be able to spend time with the patient. If other arrangements are not available then we suggest that you have a second adult to stay in the waiting room. Day of Surgery/Procedure:    As a patient at [place] [address] you can expect quality medical and nursing care that is centered on your individual needs. Our goal is to make your surgical experience as comfortable as possible    . Transportation After Your Surgery/Procedure: You will need a friend or family member to drive you home after your procedure. Your  must be 25years of age or older and able to sign off on your discharge instructions. A taxi cab or any other form of public transportation is not acceptable. Your friend or family member must stay at the hospital throughout your procedure.     Someone must remain with you for the first 24 hours

## 2018-11-30 ENCOUNTER — OFFICE VISIT (OUTPATIENT)
Dept: INTERNAL MEDICINE | Age: 48
End: 2018-11-30
Payer: COMMERCIAL

## 2018-11-30 VITALS
DIASTOLIC BLOOD PRESSURE: 70 MMHG | HEART RATE: 57 BPM | WEIGHT: 139 LBS | BODY MASS INDEX: 17.84 KG/M2 | HEIGHT: 74 IN | SYSTOLIC BLOOD PRESSURE: 120 MMHG

## 2018-11-30 DIAGNOSIS — J44.9 CHRONIC OBSTRUCTIVE PULMONARY DISEASE, UNSPECIFIED COPD TYPE (HCC): ICD-10-CM

## 2018-11-30 DIAGNOSIS — I10 ESSENTIAL HYPERTENSION: Primary | ICD-10-CM

## 2018-11-30 DIAGNOSIS — I73.9 PVD (PERIPHERAL VASCULAR DISEASE) (HCC): ICD-10-CM

## 2018-11-30 DIAGNOSIS — N18.30 STAGE 3 CHRONIC KIDNEY DISEASE (HCC): ICD-10-CM

## 2018-11-30 PROCEDURE — 3023F SPIROM DOC REV: CPT | Performed by: INTERNAL MEDICINE

## 2018-11-30 PROCEDURE — 99211 OFF/OP EST MAY X REQ PHY/QHP: CPT | Performed by: INTERNAL MEDICINE

## 2018-11-30 PROCEDURE — G8419 CALC BMI OUT NRM PARAM NOF/U: HCPCS | Performed by: INTERNAL MEDICINE

## 2018-11-30 PROCEDURE — G8926 SPIRO NO PERF OR DOC: HCPCS | Performed by: INTERNAL MEDICINE

## 2018-11-30 PROCEDURE — G8427 DOCREV CUR MEDS BY ELIG CLIN: HCPCS | Performed by: INTERNAL MEDICINE

## 2018-11-30 PROCEDURE — 1036F TOBACCO NON-USER: CPT | Performed by: INTERNAL MEDICINE

## 2018-11-30 PROCEDURE — G8484 FLU IMMUNIZE NO ADMIN: HCPCS | Performed by: INTERNAL MEDICINE

## 2018-11-30 PROCEDURE — 99214 OFFICE O/P EST MOD 30 MIN: CPT | Performed by: INTERNAL MEDICINE

## 2018-11-30 NOTE — PATIENT INSTRUCTIONS
Return To Clinic 2/15/19. After Visit Summary given and reviewed. JF    It is very important for your care that you keep your appointment. If for some reason you are unable to keep your appointment it is equally important that you call our office at [phone] to cancel your appointment and reschedule. Failure to do so may result in your termination from our practice.

## 2018-12-12 ENCOUNTER — ANESTHESIA EVENT (OUTPATIENT)
Dept: OPERATING ROOM | Age: 48
DRG: 169 | End: 2018-12-12
Payer: COMMERCIAL

## 2018-12-13 ENCOUNTER — ANESTHESIA (OUTPATIENT)
Dept: OPERATING ROOM | Age: 48
DRG: 169 | End: 2018-12-13
Payer: COMMERCIAL

## 2018-12-13 ENCOUNTER — HOSPITAL ENCOUNTER (INPATIENT)
Age: 48
LOS: 6 days | Discharge: HOME HEALTH | DRG: 169 | End: 2018-12-19
Attending: SURGERY | Admitting: SURGERY
Payer: COMMERCIAL

## 2018-12-13 VITALS — SYSTOLIC BLOOD PRESSURE: 148 MMHG | TEMPERATURE: 95.9 F | DIASTOLIC BLOOD PRESSURE: 81 MMHG | OXYGEN SATURATION: 99 %

## 2018-12-13 DIAGNOSIS — I99.8 ISCHEMIA OF TOE: ICD-10-CM

## 2018-12-13 DIAGNOSIS — J41.0 SIMPLE CHRONIC BRONCHITIS (HCC): ICD-10-CM

## 2018-12-13 DIAGNOSIS — I73.9 PVD (PERIPHERAL VASCULAR DISEASE) (HCC): Primary | ICD-10-CM

## 2018-12-13 PROBLEM — F12.90 MARIJUANA USER: Chronic | Status: ACTIVE | Noted: 2018-12-13

## 2018-12-13 LAB
ANION GAP SERPL CALCULATED.3IONS-SCNC: 12 MMOL/L (ref 9–17)
BUN BLDV-MCNC: 24 MG/DL (ref 6–20)
BUN/CREAT BLD: 19 (ref 9–20)
CALCIUM SERPL-MCNC: 8.5 MG/DL (ref 8.6–10.4)
CHLORIDE BLD-SCNC: 108 MMOL/L (ref 98–107)
CO2: 21 MMOL/L (ref 20–31)
CREAT SERPL-MCNC: 1.26 MG/DL (ref 0.7–1.2)
FIO2: 100
GFR AFRICAN AMERICAN: >60 ML/MIN
GFR NON-AFRICAN AMERICAN: >60 ML/MIN
GFR SERPL CREATININE-BSD FRML MDRD: ABNORMAL ML/MIN/{1.73_M2}
GFR SERPL CREATININE-BSD FRML MDRD: ABNORMAL ML/MIN/{1.73_M2}
GLUCOSE BLD-MCNC: 180 MG/DL (ref 70–99)
NEGATIVE BASE EXCESS, ART: 4 (ref 0–2)
O2 DEVICE/FLOW/%: ABNORMAL
PATIENT TEMP: ABNORMAL
POC HCO3: 23.3 MMOL/L (ref 22–27)
POC O2 SATURATION: 99 %
POC PCO2 TEMP: ABNORMAL MM HG
POC PCO2: 55 MM HG (ref 32–45)
POC PH TEMP: ABNORMAL
POC PH: 7.23 (ref 7.35–7.45)
POC PO2 TEMP: ABNORMAL MM HG
POC PO2: 148 MM HG (ref 75–95)
POSITIVE BASE EXCESS, ART: ABNORMAL (ref 0–2)
POTASSIUM SERPL-SCNC: 4.6 MMOL/L (ref 3.7–5.3)
SODIUM BLD-SCNC: 141 MMOL/L (ref 135–144)
TCO2 (CALC), ART: 25 MMOL/L (ref 23–28)

## 2018-12-13 PROCEDURE — 99253 IP/OBS CNSLTJ NEW/EST LOW 45: CPT | Performed by: NURSE PRACTITIONER

## 2018-12-13 PROCEDURE — 2500000003 HC RX 250 WO HCPCS

## 2018-12-13 PROCEDURE — 3700000001 HC ADD 15 MINUTES (ANESTHESIA): Performed by: SURGERY

## 2018-12-13 PROCEDURE — 36600 WITHDRAWAL OF ARTERIAL BLOOD: CPT

## 2018-12-13 PROCEDURE — C1894 INTRO/SHEATH, NON-LASER: HCPCS | Performed by: SURGERY

## 2018-12-13 PROCEDURE — 2580000003 HC RX 258

## 2018-12-13 PROCEDURE — 2580000003 HC RX 258: Performed by: ANESTHESIOLOGY

## 2018-12-13 PROCEDURE — 3700000000 HC ANESTHESIA ATTENDED CARE: Performed by: SURGERY

## 2018-12-13 PROCEDURE — 80048 BASIC METABOLIC PNL TOTAL CA: CPT

## 2018-12-13 PROCEDURE — 7100000001 HC PACU RECOVERY - ADDTL 15 MIN: Performed by: SURGERY

## 2018-12-13 PROCEDURE — L8670 VASCULAR GRAFT, SYNTHETIC: HCPCS | Performed by: SURGERY

## 2018-12-13 PROCEDURE — 6360000002 HC RX W HCPCS

## 2018-12-13 PROCEDURE — 3600000012 HC SURGERY LEVEL 2 ADDTL 15MIN: Performed by: SURGERY

## 2018-12-13 PROCEDURE — 2500000003 HC RX 250 WO HCPCS: Performed by: SURGERY

## 2018-12-13 PROCEDURE — 2580000003 HC RX 258: Performed by: SURGERY

## 2018-12-13 PROCEDURE — 2580000003 HC RX 258: Performed by: NURSE ANESTHETIST, CERTIFIED REGISTERED

## 2018-12-13 PROCEDURE — 6360000002 HC RX W HCPCS: Performed by: ANESTHESIOLOGY

## 2018-12-13 PROCEDURE — 2500000003 HC RX 250 WO HCPCS: Performed by: NURSE ANESTHETIST, CERTIFIED REGISTERED

## 2018-12-13 PROCEDURE — 2000000000 HC ICU R&B

## 2018-12-13 PROCEDURE — 82803 BLOOD GASES ANY COMBINATION: CPT

## 2018-12-13 PROCEDURE — 04100JK BYPASS ABDOMINAL AORTA TO BILATERAL FEMORAL ARTERIES WITH SYNTHETIC SUBSTITUTE, OPEN APPROACH: ICD-10-PCS | Performed by: SURGERY

## 2018-12-13 PROCEDURE — 02HV33Z INSERTION OF INFUSION DEVICE INTO SUPERIOR VENA CAVA, PERCUTANEOUS APPROACH: ICD-10-PCS | Performed by: ANESTHESIOLOGY

## 2018-12-13 PROCEDURE — 6360000002 HC RX W HCPCS: Performed by: SURGERY

## 2018-12-13 PROCEDURE — 7100000000 HC PACU RECOVERY - FIRST 15 MIN: Performed by: SURGERY

## 2018-12-13 PROCEDURE — 6370000000 HC RX 637 (ALT 250 FOR IP): Performed by: SURGERY

## 2018-12-13 PROCEDURE — 36556 INSERT NON-TUNNEL CV CATH: CPT | Performed by: ANESTHESIOLOGY

## 2018-12-13 PROCEDURE — 2709999900 HC NON-CHARGEABLE SUPPLY: Performed by: SURGERY

## 2018-12-13 PROCEDURE — 3600000002 HC SURGERY LEVEL 2 BASE: Performed by: SURGERY

## 2018-12-13 PROCEDURE — 6360000002 HC RX W HCPCS: Performed by: NURSE ANESTHETIST, CERTIFIED REGISTERED

## 2018-12-13 DEVICE — IMPLANTABLE DEVICE: Type: IMPLANTABLE DEVICE | Site: GROIN | Status: FUNCTIONAL

## 2018-12-13 RX ORDER — FENTANYL CITRATE 50 UG/ML
INJECTION, SOLUTION INTRAMUSCULAR; INTRAVENOUS PRN
Status: DISCONTINUED | OUTPATIENT
Start: 2018-12-13 | End: 2018-12-13 | Stop reason: SDUPTHER

## 2018-12-13 RX ORDER — ATORVASTATIN CALCIUM 40 MG/1
40 TABLET, FILM COATED ORAL NIGHTLY
Status: DISCONTINUED | OUTPATIENT
Start: 2018-12-13 | End: 2018-12-19 | Stop reason: HOSPADM

## 2018-12-13 RX ORDER — DEXAMETHASONE SODIUM PHOSPHATE 10 MG/ML
4 INJECTION INTRAMUSCULAR; INTRAVENOUS
Status: DISCONTINUED | OUTPATIENT
Start: 2018-12-13 | End: 2018-12-13 | Stop reason: HOSPADM

## 2018-12-13 RX ORDER — PROPOFOL 10 MG/ML
INJECTION, EMULSION INTRAVENOUS PRN
Status: DISCONTINUED | OUTPATIENT
Start: 2018-12-13 | End: 2018-12-13 | Stop reason: SDUPTHER

## 2018-12-13 RX ORDER — HYDROMORPHONE HCL 110MG/55ML
0.5 PATIENT CONTROLLED ANALGESIA SYRINGE INTRAVENOUS EVERY 5 MIN PRN
Status: DISCONTINUED | OUTPATIENT
Start: 2018-12-13 | End: 2018-12-13 | Stop reason: HOSPADM

## 2018-12-13 RX ORDER — HYDROMORPHONE HCL 110MG/55ML
0.5 PATIENT CONTROLLED ANALGESIA SYRINGE INTRAVENOUS EVERY 5 MIN PRN
Status: COMPLETED | OUTPATIENT
Start: 2018-12-13 | End: 2018-12-13

## 2018-12-13 RX ORDER — MEPERIDINE HYDROCHLORIDE 50 MG/ML
12.5 INJECTION INTRAMUSCULAR; INTRAVENOUS; SUBCUTANEOUS EVERY 5 MIN PRN
Status: DISCONTINUED | OUTPATIENT
Start: 2018-12-13 | End: 2018-12-13 | Stop reason: HOSPADM

## 2018-12-13 RX ORDER — LABETALOL HYDROCHLORIDE 5 MG/ML
5 INJECTION, SOLUTION INTRAVENOUS EVERY 10 MIN PRN
Status: DISCONTINUED | OUTPATIENT
Start: 2018-12-13 | End: 2018-12-13 | Stop reason: HOSPADM

## 2018-12-13 RX ORDER — MORPHINE SULFATE 2 MG/ML
2 INJECTION, SOLUTION INTRAMUSCULAR; INTRAVENOUS
Status: DISCONTINUED | OUTPATIENT
Start: 2018-12-13 | End: 2018-12-13

## 2018-12-13 RX ORDER — ONDANSETRON 2 MG/ML
4 INJECTION INTRAMUSCULAR; INTRAVENOUS
Status: DISCONTINUED | OUTPATIENT
Start: 2018-12-13 | End: 2018-12-13 | Stop reason: HOSPADM

## 2018-12-13 RX ORDER — DOCUSATE SODIUM 100 MG/1
100 CAPSULE, LIQUID FILLED ORAL 2 TIMES DAILY
Status: DISCONTINUED | OUTPATIENT
Start: 2018-12-13 | End: 2018-12-19 | Stop reason: HOSPADM

## 2018-12-13 RX ORDER — DIPHENHYDRAMINE HYDROCHLORIDE 50 MG/ML
12.5 INJECTION INTRAMUSCULAR; INTRAVENOUS
Status: DISCONTINUED | OUTPATIENT
Start: 2018-12-13 | End: 2018-12-13 | Stop reason: HOSPADM

## 2018-12-13 RX ORDER — CEFAZOLIN SODIUM 2 G/50ML
2 SOLUTION INTRAVENOUS ONCE
Status: COMPLETED | OUTPATIENT
Start: 2018-12-13 | End: 2018-12-13

## 2018-12-13 RX ORDER — SODIUM CHLORIDE 0.9 % (FLUSH) 0.9 %
10 SYRINGE (ML) INJECTION EVERY 12 HOURS SCHEDULED
Status: DISCONTINUED | OUTPATIENT
Start: 2018-12-13 | End: 2018-12-19 | Stop reason: HOSPADM

## 2018-12-13 RX ORDER — SODIUM CHLORIDE 0.9 % (FLUSH) 0.9 %
10 SYRINGE (ML) INJECTION EVERY 12 HOURS SCHEDULED
Status: DISCONTINUED | OUTPATIENT
Start: 2018-12-13 | End: 2018-12-13 | Stop reason: HOSPADM

## 2018-12-13 RX ORDER — ONDANSETRON 4 MG/1
4 TABLET, ORALLY DISINTEGRATING ORAL EVERY 6 HOURS PRN
Status: DISCONTINUED | OUTPATIENT
Start: 2018-12-13 | End: 2018-12-19 | Stop reason: HOSPADM

## 2018-12-13 RX ORDER — SODIUM CHLORIDE, SODIUM LACTATE, POTASSIUM CHLORIDE, CALCIUM CHLORIDE 600; 310; 30; 20 MG/100ML; MG/100ML; MG/100ML; MG/100ML
INJECTION, SOLUTION INTRAVENOUS CONTINUOUS PRN
Status: DISCONTINUED | OUTPATIENT
Start: 2018-12-13 | End: 2018-12-13 | Stop reason: SDUPTHER

## 2018-12-13 RX ORDER — HYDRALAZINE HYDROCHLORIDE 20 MG/ML
5 INJECTION INTRAMUSCULAR; INTRAVENOUS EVERY 10 MIN PRN
Status: DISCONTINUED | OUTPATIENT
Start: 2018-12-13 | End: 2018-12-13 | Stop reason: HOSPADM

## 2018-12-13 RX ORDER — SODIUM CHLORIDE 9 MG/ML
INJECTION, SOLUTION INTRAVENOUS CONTINUOUS
Status: DISCONTINUED | OUTPATIENT
Start: 2018-12-13 | End: 2018-12-15

## 2018-12-13 RX ORDER — HEPARIN SODIUM 1000 [USP'U]/ML
INJECTION, SOLUTION INTRAVENOUS; SUBCUTANEOUS PRN
Status: DISCONTINUED | OUTPATIENT
Start: 2018-12-13 | End: 2018-12-13 | Stop reason: SDUPTHER

## 2018-12-13 RX ORDER — HYDROCODONE BITARTRATE AND ACETAMINOPHEN 5; 325 MG/1; MG/1
1 TABLET ORAL EVERY 4 HOURS PRN
Status: DISCONTINUED | OUTPATIENT
Start: 2018-12-13 | End: 2018-12-19

## 2018-12-13 RX ORDER — ASPIRIN 81 MG/1
81 TABLET ORAL DAILY
Status: DISCONTINUED | OUTPATIENT
Start: 2018-12-13 | End: 2018-12-19 | Stop reason: HOSPADM

## 2018-12-13 RX ORDER — FENTANYL CITRATE 50 UG/ML
25 INJECTION, SOLUTION INTRAMUSCULAR; INTRAVENOUS EVERY 5 MIN PRN
Status: DISCONTINUED | OUTPATIENT
Start: 2018-12-13 | End: 2018-12-13 | Stop reason: HOSPADM

## 2018-12-13 RX ORDER — ACETAMINOPHEN 325 MG/1
650 TABLET ORAL EVERY 4 HOURS PRN
Status: DISCONTINUED | OUTPATIENT
Start: 2018-12-13 | End: 2018-12-19 | Stop reason: HOSPADM

## 2018-12-13 RX ORDER — SODIUM CHLORIDE 9 MG/ML
INJECTION, SOLUTION INTRAVENOUS CONTINUOUS PRN
Status: DISCONTINUED | OUTPATIENT
Start: 2018-12-13 | End: 2018-12-13 | Stop reason: SDUPTHER

## 2018-12-13 RX ORDER — LIDOCAINE HYDROCHLORIDE 20 MG/ML
INJECTION, SOLUTION EPIDURAL; INFILTRATION; INTRACAUDAL; PERINEURAL PRN
Status: DISCONTINUED | OUTPATIENT
Start: 2018-12-13 | End: 2018-12-13 | Stop reason: SDUPTHER

## 2018-12-13 RX ORDER — SODIUM CHLORIDE 9 MG/ML
INJECTION, SOLUTION INTRAVENOUS CONTINUOUS
Status: DISCONTINUED | OUTPATIENT
Start: 2018-12-14 | End: 2018-12-13

## 2018-12-13 RX ORDER — SODIUM CHLORIDE 0.9 % (FLUSH) 0.9 %
10 SYRINGE (ML) INJECTION PRN
Status: DISCONTINUED | OUTPATIENT
Start: 2018-12-13 | End: 2018-12-19 | Stop reason: HOSPADM

## 2018-12-13 RX ORDER — LABETALOL HYDROCHLORIDE 5 MG/ML
10 INJECTION, SOLUTION INTRAVENOUS
Status: DISCONTINUED | OUTPATIENT
Start: 2018-12-13 | End: 2018-12-14

## 2018-12-13 RX ORDER — AMLODIPINE BESYLATE 5 MG/1
5 TABLET ORAL DAILY
Status: DISCONTINUED | OUTPATIENT
Start: 2018-12-13 | End: 2018-12-19 | Stop reason: HOSPADM

## 2018-12-13 RX ORDER — FAMOTIDINE 20 MG/1
20 TABLET, FILM COATED ORAL 2 TIMES DAILY
Status: DISCONTINUED | OUTPATIENT
Start: 2018-12-13 | End: 2018-12-19 | Stop reason: HOSPADM

## 2018-12-13 RX ORDER — HYDRALAZINE HYDROCHLORIDE 20 MG/ML
10 INJECTION INTRAMUSCULAR; INTRAVENOUS
Status: DISCONTINUED | OUTPATIENT
Start: 2018-12-13 | End: 2018-12-19 | Stop reason: HOSPADM

## 2018-12-13 RX ORDER — SODIUM CHLORIDE 0.9 % (FLUSH) 0.9 %
10 SYRINGE (ML) INJECTION PRN
Status: DISCONTINUED | OUTPATIENT
Start: 2018-12-13 | End: 2018-12-13 | Stop reason: HOSPADM

## 2018-12-13 RX ORDER — MIDAZOLAM HYDROCHLORIDE 1 MG/ML
INJECTION INTRAMUSCULAR; INTRAVENOUS
Status: COMPLETED
Start: 2018-12-13 | End: 2018-12-13

## 2018-12-13 RX ORDER — GLYCOPYRROLATE 1 MG/5 ML
SYRINGE (ML) INTRAVENOUS PRN
Status: DISCONTINUED | OUTPATIENT
Start: 2018-12-13 | End: 2018-12-13 | Stop reason: SDUPTHER

## 2018-12-13 RX ORDER — MORPHINE SULFATE 4 MG/ML
4 INJECTION, SOLUTION INTRAMUSCULAR; INTRAVENOUS
Status: DISCONTINUED | OUTPATIENT
Start: 2018-12-13 | End: 2018-12-13

## 2018-12-13 RX ORDER — DEXAMETHASONE SODIUM PHOSPHATE 10 MG/ML
INJECTION INTRAMUSCULAR; INTRAVENOUS PRN
Status: DISCONTINUED | OUTPATIENT
Start: 2018-12-13 | End: 2018-12-13 | Stop reason: SDUPTHER

## 2018-12-13 RX ORDER — MIDAZOLAM HYDROCHLORIDE 1 MG/ML
1 INJECTION INTRAMUSCULAR; INTRAVENOUS
Status: COMPLETED | OUTPATIENT
Start: 2018-12-13 | End: 2018-12-13

## 2018-12-13 RX ORDER — ONDANSETRON 2 MG/ML
4 INJECTION INTRAMUSCULAR; INTRAVENOUS EVERY 6 HOURS PRN
Status: DISCONTINUED | OUTPATIENT
Start: 2018-12-13 | End: 2018-12-19 | Stop reason: HOSPADM

## 2018-12-13 RX ORDER — HYDROCODONE BITARTRATE AND ACETAMINOPHEN 5; 325 MG/1; MG/1
2 TABLET ORAL EVERY 4 HOURS PRN
Status: DISCONTINUED | OUTPATIENT
Start: 2018-12-13 | End: 2018-12-19

## 2018-12-13 RX ORDER — ONDANSETRON 2 MG/ML
4 INJECTION INTRAMUSCULAR; INTRAVENOUS EVERY 6 HOURS PRN
Status: DISCONTINUED | OUTPATIENT
Start: 2018-12-13 | End: 2018-12-13

## 2018-12-13 RX ORDER — ONDANSETRON 2 MG/ML
INJECTION INTRAMUSCULAR; INTRAVENOUS PRN
Status: DISCONTINUED | OUTPATIENT
Start: 2018-12-13 | End: 2018-12-13 | Stop reason: SDUPTHER

## 2018-12-13 RX ORDER — SODIUM CHLORIDE, SODIUM LACTATE, POTASSIUM CHLORIDE, CALCIUM CHLORIDE 600; 310; 30; 20 MG/100ML; MG/100ML; MG/100ML; MG/100ML
INJECTION, SOLUTION INTRAVENOUS CONTINUOUS
Status: DISCONTINUED | OUTPATIENT
Start: 2018-12-14 | End: 2018-12-13

## 2018-12-13 RX ORDER — LIDOCAINE 40 MG/G
CREAM TOPICAL PRN
Status: DISCONTINUED | OUTPATIENT
Start: 2018-12-13 | End: 2018-12-19 | Stop reason: HOSPADM

## 2018-12-13 RX ORDER — EPHEDRINE SULFATE/0.9% NACL/PF 10MG/ML(1)
SYRINGE (ML) INTRAVENOUS PRN
Status: DISCONTINUED | OUTPATIENT
Start: 2018-12-13 | End: 2018-12-13 | Stop reason: SDUPTHER

## 2018-12-13 RX ORDER — ALBUTEROL SULFATE 90 UG/1
2 AEROSOL, METERED RESPIRATORY (INHALATION) EVERY 6 HOURS PRN
Status: DISCONTINUED | OUTPATIENT
Start: 2018-12-13 | End: 2018-12-19 | Stop reason: HOSPADM

## 2018-12-13 RX ORDER — ROCURONIUM BROMIDE 10 MG/ML
INJECTION, SOLUTION INTRAVENOUS PRN
Status: DISCONTINUED | OUTPATIENT
Start: 2018-12-13 | End: 2018-12-13 | Stop reason: SDUPTHER

## 2018-12-13 RX ORDER — LIDOCAINE HYDROCHLORIDE 10 MG/ML
1 INJECTION, SOLUTION EPIDURAL; INFILTRATION; INTRACAUDAL; PERINEURAL
Status: DISCONTINUED | OUTPATIENT
Start: 2018-12-13 | End: 2018-12-13 | Stop reason: HOSPADM

## 2018-12-13 RX ORDER — PROTAMINE SULFATE 10 MG/ML
INJECTION, SOLUTION INTRAVENOUS PRN
Status: DISCONTINUED | OUTPATIENT
Start: 2018-12-13 | End: 2018-12-13 | Stop reason: SDUPTHER

## 2018-12-13 RX ADMIN — HYDROMORPHONE HYDROCHLORIDE 0.5 MG: 2 INJECTION INTRAMUSCULAR; INTRAVENOUS; SUBCUTANEOUS at 16:21

## 2018-12-13 RX ADMIN — Medication 0.1 MG: at 14:40

## 2018-12-13 RX ADMIN — HYDROMORPHONE HYDROCHLORIDE 0.5 MG: 1 INJECTION, SOLUTION INTRAMUSCULAR; INTRAVENOUS; SUBCUTANEOUS at 11:29

## 2018-12-13 RX ADMIN — HYDROMORPHONE HYDROCHLORIDE 0.5 MG: 2 INJECTION INTRAMUSCULAR; INTRAVENOUS; SUBCUTANEOUS at 16:26

## 2018-12-13 RX ADMIN — HYDROMORPHONE HYDROCHLORIDE 0.5 MG: 2 INJECTION INTRAMUSCULAR; INTRAVENOUS; SUBCUTANEOUS at 16:31

## 2018-12-13 RX ADMIN — CEFAZOLIN SODIUM 2 G: 2 SOLUTION INTRAVENOUS at 14:15

## 2018-12-13 RX ADMIN — MORPHINE SULFATE 4 MG: 4 INJECTION INTRAVENOUS at 21:52

## 2018-12-13 RX ADMIN — HEPARIN SODIUM 3000 UNITS: 1000 INJECTION, SOLUTION INTRAVENOUS; SUBCUTANEOUS at 14:10

## 2018-12-13 RX ADMIN — CEFAZOLIN SODIUM 2 G: 1 INJECTION, POWDER, FOR SOLUTION INTRAMUSCULAR; INTRAVENOUS at 19:51

## 2018-12-13 RX ADMIN — HEPARIN SODIUM 6000 UNITS: 1000 INJECTION, SOLUTION INTRAVENOUS; SUBCUTANEOUS at 12:20

## 2018-12-13 RX ADMIN — SODIUM CHLORIDE, POTASSIUM CHLORIDE, SODIUM LACTATE AND CALCIUM CHLORIDE: 600; 310; 30; 20 INJECTION, SOLUTION INTRAVENOUS at 10:35

## 2018-12-13 RX ADMIN — MIDAZOLAM HYDROCHLORIDE 1 MG: 2 INJECTION, SOLUTION INTRAMUSCULAR; INTRAVENOUS at 17:25

## 2018-12-13 RX ADMIN — SODIUM CHLORIDE, POTASSIUM CHLORIDE, SODIUM LACTATE AND CALCIUM CHLORIDE: 600; 310; 30; 20 INJECTION, SOLUTION INTRAVENOUS at 08:56

## 2018-12-13 RX ADMIN — FENTANYL CITRATE 50 MCG: 50 INJECTION, SOLUTION INTRAMUSCULAR; INTRAVENOUS at 15:38

## 2018-12-13 RX ADMIN — HYDROMORPHONE HYDROCHLORIDE 0.5 MG: 2 INJECTION INTRAMUSCULAR; INTRAVENOUS; SUBCUTANEOUS at 17:55

## 2018-12-13 RX ADMIN — SODIUM CHLORIDE, POTASSIUM CHLORIDE, SODIUM LACTATE AND CALCIUM CHLORIDE: 600; 310; 30; 20 INJECTION, SOLUTION INTRAVENOUS at 12:44

## 2018-12-13 RX ADMIN — Medication 10 MG: at 10:18

## 2018-12-13 RX ADMIN — FENTANYL CITRATE 50 MCG: 50 INJECTION, SOLUTION INTRAMUSCULAR; INTRAVENOUS at 10:02

## 2018-12-13 RX ADMIN — Medication 10 MG: at 10:17

## 2018-12-13 RX ADMIN — FENTANYL CITRATE 50 MCG: 50 INJECTION, SOLUTION INTRAMUSCULAR; INTRAVENOUS at 10:00

## 2018-12-13 RX ADMIN — LIDOCAINE HYDROCHLORIDE 80 MG: 20 INJECTION, SOLUTION EPIDURAL; INFILTRATION; INTRACAUDAL; PERINEURAL at 10:00

## 2018-12-13 RX ADMIN — ROCURONIUM BROMIDE 25 MG: 10 INJECTION, SOLUTION INTRAVENOUS at 14:03

## 2018-12-13 RX ADMIN — HYDROMORPHONE HYDROCHLORIDE 1 MG: 1 INJECTION, SOLUTION INTRAMUSCULAR; INTRAVENOUS; SUBCUTANEOUS at 12:12

## 2018-12-13 RX ADMIN — DEXAMETHASONE SODIUM PHOSPHATE 50 MG: 10 INJECTION INTRAMUSCULAR; INTRAVENOUS at 11:05

## 2018-12-13 RX ADMIN — HEPARIN SODIUM 3000 UNITS: 1000 INJECTION, SOLUTION INTRAVENOUS; SUBCUTANEOUS at 13:19

## 2018-12-13 RX ADMIN — HYDROMORPHONE HYDROCHLORIDE 0.5 MG: 2 INJECTION INTRAMUSCULAR; INTRAVENOUS; SUBCUTANEOUS at 17:11

## 2018-12-13 RX ADMIN — MIDAZOLAM HYDROCHLORIDE 1 MG: 1 INJECTION INTRAMUSCULAR; INTRAVENOUS at 17:25

## 2018-12-13 RX ADMIN — CEFAZOLIN SODIUM 2 G: 2 SOLUTION INTRAVENOUS at 10:19

## 2018-12-13 RX ADMIN — Medication 10 ML: at 21:00

## 2018-12-13 RX ADMIN — FENTANYL CITRATE 50 MCG: 50 INJECTION, SOLUTION INTRAMUSCULAR; INTRAVENOUS at 11:05

## 2018-12-13 RX ADMIN — MORPHINE SULFATE 2 MG: 2 INJECTION, SOLUTION INTRAMUSCULAR; INTRAVENOUS at 20:07

## 2018-12-13 RX ADMIN — Medication 0.1 MG: at 15:02

## 2018-12-13 RX ADMIN — Medication 0.1 MG: at 14:53

## 2018-12-13 RX ADMIN — HYDROMORPHONE HYDROCHLORIDE 0.5 MG: 2 INJECTION INTRAMUSCULAR; INTRAVENOUS; SUBCUTANEOUS at 16:12

## 2018-12-13 RX ADMIN — HYDROMORPHONE HYDROCHLORIDE 0.5 MG: 2 INJECTION INTRAMUSCULAR; INTRAVENOUS; SUBCUTANEOUS at 16:41

## 2018-12-13 RX ADMIN — ONDANSETRON 4 MG: 2 INJECTION, SOLUTION INTRAMUSCULAR; INTRAVENOUS at 10:10

## 2018-12-13 RX ADMIN — PROPOFOL 120 MG: 10 INJECTION, EMULSION INTRAVENOUS at 10:00

## 2018-12-13 RX ADMIN — SODIUM CHLORIDE, POTASSIUM CHLORIDE, SODIUM LACTATE AND CALCIUM CHLORIDE: 600; 310; 30; 20 INJECTION, SOLUTION INTRAVENOUS at 09:58

## 2018-12-13 RX ADMIN — Medication 0.1 MG: at 11:46

## 2018-12-13 RX ADMIN — DEXAMETHASONE SODIUM PHOSPHATE 10 MG: 10 INJECTION INTRAMUSCULAR; INTRAVENOUS at 10:10

## 2018-12-13 RX ADMIN — Medication 0.2 MG: at 10:46

## 2018-12-13 RX ADMIN — PROPOFOL 50 MG: 10 INJECTION, EMULSION INTRAVENOUS at 10:01

## 2018-12-13 RX ADMIN — ROCURONIUM BROMIDE 50 MG: 10 INJECTION, SOLUTION INTRAVENOUS at 11:23

## 2018-12-13 RX ADMIN — SODIUM CHLORIDE: 9 INJECTION, SOLUTION INTRAVENOUS at 21:50

## 2018-12-13 RX ADMIN — HYDROMORPHONE HYDROCHLORIDE 0.5 MG: 1 INJECTION, SOLUTION INTRAMUSCULAR; INTRAVENOUS; SUBCUTANEOUS at 11:20

## 2018-12-13 RX ADMIN — ROCURONIUM BROMIDE 50 MG: 10 INJECTION, SOLUTION INTRAVENOUS at 10:01

## 2018-12-13 RX ADMIN — FENTANYL CITRATE 50 MCG: 50 INJECTION, SOLUTION INTRAMUSCULAR; INTRAVENOUS at 15:34

## 2018-12-13 RX ADMIN — PROTAMINE SULFATE 20 MG: 10 INJECTION, SOLUTION INTRAVENOUS at 14:42

## 2018-12-13 RX ADMIN — SODIUM CHLORIDE: 9 INJECTION, SOLUTION INTRAVENOUS at 10:45

## 2018-12-13 RX ADMIN — ONDANSETRON 4 MG: 2 INJECTION INTRAMUSCULAR; INTRAVENOUS at 19:57

## 2018-12-13 RX ADMIN — FENTANYL CITRATE 100 MCG: 50 INJECTION, SOLUTION INTRAMUSCULAR; INTRAVENOUS at 10:30

## 2018-12-13 RX ADMIN — SUGAMMADEX 200 MG: 100 INJECTION, SOLUTION INTRAVENOUS at 15:19

## 2018-12-13 ASSESSMENT — PULMONARY FUNCTION TESTS
PIF_VALUE: 14
PIF_VALUE: 17
PIF_VALUE: 16
PIF_VALUE: 15
PIF_VALUE: 16
PIF_VALUE: 17
PIF_VALUE: 16
PIF_VALUE: 15
PIF_VALUE: 16
PIF_VALUE: 17
PIF_VALUE: 15
PIF_VALUE: 16
PIF_VALUE: 17
PIF_VALUE: 16
PIF_VALUE: 14
PIF_VALUE: 14
PIF_VALUE: 16
PIF_VALUE: 16
PIF_VALUE: 14
PIF_VALUE: 14
PIF_VALUE: 16
PIF_VALUE: 17
PIF_VALUE: 17
PIF_VALUE: 18
PIF_VALUE: 17
PIF_VALUE: 12
PIF_VALUE: 14
PIF_VALUE: 19
PIF_VALUE: 15
PIF_VALUE: 17
PIF_VALUE: 17
PIF_VALUE: 1
PIF_VALUE: 14
PIF_VALUE: 16
PIF_VALUE: 17
PIF_VALUE: 14
PIF_VALUE: 17
PIF_VALUE: 17
PIF_VALUE: 14
PIF_VALUE: 3
PIF_VALUE: 16
PIF_VALUE: 14
PIF_VALUE: 17
PIF_VALUE: 16
PIF_VALUE: 17
PIF_VALUE: 14
PIF_VALUE: 1
PIF_VALUE: 15
PIF_VALUE: 17
PIF_VALUE: 14
PIF_VALUE: 16
PIF_VALUE: 15
PIF_VALUE: 16
PIF_VALUE: 15
PIF_VALUE: 14
PIF_VALUE: 15
PIF_VALUE: 17
PIF_VALUE: 3
PIF_VALUE: 3
PIF_VALUE: 17
PIF_VALUE: 0
PIF_VALUE: 22
PIF_VALUE: 17
PIF_VALUE: 14
PIF_VALUE: 16
PIF_VALUE: 17
PIF_VALUE: 14
PIF_VALUE: 16
PIF_VALUE: 15
PIF_VALUE: 17
PIF_VALUE: 15
PIF_VALUE: 16
PIF_VALUE: 17
PIF_VALUE: 15
PIF_VALUE: 1
PIF_VALUE: 15
PIF_VALUE: 14
PIF_VALUE: 16
PIF_VALUE: 17
PIF_VALUE: 16
PIF_VALUE: 16
PIF_VALUE: 15
PIF_VALUE: 17
PIF_VALUE: 16
PIF_VALUE: 17
PIF_VALUE: 17
PIF_VALUE: 15
PIF_VALUE: 1
PIF_VALUE: 17
PIF_VALUE: 14
PIF_VALUE: 16
PIF_VALUE: 16
PIF_VALUE: 17
PIF_VALUE: 14
PIF_VALUE: 17
PIF_VALUE: 17
PIF_VALUE: 15
PIF_VALUE: 15
PIF_VALUE: 14
PIF_VALUE: 17
PIF_VALUE: 17
PIF_VALUE: 14
PIF_VALUE: 16
PIF_VALUE: 16
PIF_VALUE: 15
PIF_VALUE: 14
PIF_VALUE: 17
PIF_VALUE: 17
PIF_VALUE: 15
PIF_VALUE: 16
PIF_VALUE: 17
PIF_VALUE: 15
PIF_VALUE: 16
PIF_VALUE: 15
PIF_VALUE: 15
PIF_VALUE: 16
PIF_VALUE: 17
PIF_VALUE: 15
PIF_VALUE: 3
PIF_VALUE: 17
PIF_VALUE: 14
PIF_VALUE: 14
PIF_VALUE: 17
PIF_VALUE: 16
PIF_VALUE: 17
PIF_VALUE: 16
PIF_VALUE: 14
PIF_VALUE: 15
PIF_VALUE: 17
PIF_VALUE: 16
PIF_VALUE: 15
PIF_VALUE: 17
PIF_VALUE: 17
PIF_VALUE: 16
PIF_VALUE: 15
PIF_VALUE: 17
PIF_VALUE: 14
PIF_VALUE: 17
PIF_VALUE: 17
PIF_VALUE: 16
PIF_VALUE: 17
PIF_VALUE: 16
PIF_VALUE: 17
PIF_VALUE: 16
PIF_VALUE: 15
PIF_VALUE: 16
PIF_VALUE: 17
PIF_VALUE: 17
PIF_VALUE: 14
PIF_VALUE: 17
PIF_VALUE: 17
PIF_VALUE: 16
PIF_VALUE: 16
PIF_VALUE: 17
PIF_VALUE: 18
PIF_VALUE: 15
PIF_VALUE: 15
PIF_VALUE: 16
PIF_VALUE: 2
PIF_VALUE: 17
PIF_VALUE: 1
PIF_VALUE: 14
PIF_VALUE: 3
PIF_VALUE: 17
PIF_VALUE: 16
PIF_VALUE: 16
PIF_VALUE: 15
PIF_VALUE: 14
PIF_VALUE: 14
PIF_VALUE: 16
PIF_VALUE: 16
PIF_VALUE: 15
PIF_VALUE: 16
PIF_VALUE: 17
PIF_VALUE: 17
PIF_VALUE: 16
PIF_VALUE: 14
PIF_VALUE: 14
PIF_VALUE: 15
PIF_VALUE: 15
PIF_VALUE: 14
PIF_VALUE: 16
PIF_VALUE: 17
PIF_VALUE: 16
PIF_VALUE: 17
PIF_VALUE: 15
PIF_VALUE: 16
PIF_VALUE: 17
PIF_VALUE: 16
PIF_VALUE: 14
PIF_VALUE: 16
PIF_VALUE: 15
PIF_VALUE: 17
PIF_VALUE: 17
PIF_VALUE: 14
PIF_VALUE: 17
PIF_VALUE: 14
PIF_VALUE: 14
PIF_VALUE: 16
PIF_VALUE: 15
PIF_VALUE: 17
PIF_VALUE: 17
PIF_VALUE: 11
PIF_VALUE: 17
PIF_VALUE: 17
PIF_VALUE: 14
PIF_VALUE: 17
PIF_VALUE: 17
PIF_VALUE: 14
PIF_VALUE: 15
PIF_VALUE: 22
PIF_VALUE: 17
PIF_VALUE: 14
PIF_VALUE: 16
PIF_VALUE: 17
PIF_VALUE: 15
PIF_VALUE: 16
PIF_VALUE: 0
PIF_VALUE: 16
PIF_VALUE: 14
PIF_VALUE: 17
PIF_VALUE: 16
PIF_VALUE: 14
PIF_VALUE: 16
PIF_VALUE: 17
PIF_VALUE: 15
PIF_VALUE: 0
PIF_VALUE: 17
PIF_VALUE: 15
PIF_VALUE: 17
PIF_VALUE: 15
PIF_VALUE: 17
PIF_VALUE: 15
PIF_VALUE: 17
PIF_VALUE: 14
PIF_VALUE: 17
PIF_VALUE: 14
PIF_VALUE: 14
PIF_VALUE: 15
PIF_VALUE: 17
PIF_VALUE: 16
PIF_VALUE: 15
PIF_VALUE: 14
PIF_VALUE: 15
PIF_VALUE: 3
PIF_VALUE: 17
PIF_VALUE: 15
PIF_VALUE: 15
PIF_VALUE: 16
PIF_VALUE: 15
PIF_VALUE: 16
PIF_VALUE: 16
PIF_VALUE: 17
PIF_VALUE: 15
PIF_VALUE: 16
PIF_VALUE: 16
PIF_VALUE: 14
PIF_VALUE: 16
PIF_VALUE: 17
PIF_VALUE: 17
PIF_VALUE: 15
PIF_VALUE: 17
PIF_VALUE: 14
PIF_VALUE: 15
PIF_VALUE: 16
PIF_VALUE: 16
PIF_VALUE: 15
PIF_VALUE: 14
PIF_VALUE: 16
PIF_VALUE: 16
PIF_VALUE: 14
PIF_VALUE: 14
PIF_VALUE: 16
PIF_VALUE: 15
PIF_VALUE: 1
PIF_VALUE: 17
PIF_VALUE: 17
PIF_VALUE: 15
PIF_VALUE: 1
PIF_VALUE: 0
PIF_VALUE: 14
PIF_VALUE: 17
PIF_VALUE: 16
PIF_VALUE: 3
PIF_VALUE: 15
PIF_VALUE: 16
PIF_VALUE: 14
PIF_VALUE: 16
PIF_VALUE: 17
PIF_VALUE: 15
PIF_VALUE: 18
PIF_VALUE: 14
PIF_VALUE: 14
PIF_VALUE: 16
PIF_VALUE: 17
PIF_VALUE: 14
PIF_VALUE: 17
PIF_VALUE: 15
PIF_VALUE: 15
PIF_VALUE: 14
PIF_VALUE: 16
PIF_VALUE: 16
PIF_VALUE: 17
PIF_VALUE: 16
PIF_VALUE: 17
PIF_VALUE: 14
PIF_VALUE: 15
PIF_VALUE: 16
PIF_VALUE: 16
PIF_VALUE: 15
PIF_VALUE: 14
PIF_VALUE: 14
PIF_VALUE: 17
PIF_VALUE: 16
PIF_VALUE: 15

## 2018-12-13 ASSESSMENT — PAIN SCALES - GENERAL
PAINLEVEL_OUTOF10: 10
PAINLEVEL_OUTOF10: 6
PAINLEVEL_OUTOF10: 10
PAINLEVEL_OUTOF10: 8

## 2018-12-13 ASSESSMENT — PAIN DESCRIPTION - DESCRIPTORS: DESCRIPTORS: SHOOTING

## 2018-12-13 ASSESSMENT — PAIN DESCRIPTION - ONSET: ONSET: ON-GOING

## 2018-12-13 ASSESSMENT — PAIN DESCRIPTION - ORIENTATION: ORIENTATION: RIGHT;LEFT;MID

## 2018-12-13 ASSESSMENT — PAIN DESCRIPTION - LOCATION
LOCATION: ABDOMEN
LOCATION: ABDOMEN

## 2018-12-13 ASSESSMENT — PAIN DESCRIPTION - PAIN TYPE
TYPE: SURGICAL PAIN
TYPE: SURGICAL PAIN

## 2018-12-13 ASSESSMENT — PAIN DESCRIPTION - FREQUENCY: FREQUENCY: CONTINUOUS

## 2018-12-13 NOTE — H&P
the past year. Pt has non-radiating, daily, 5/10, sharp, left upper chest pain which occurs upon awakening in the morning and lasts for a few minutes. Episodes of chest pain have been occurring for the past 2 months. The last episode of chest pain was this morning. Pt denies history of a MI or cardiac catheterization with stent placement. Lightheadedness and dizziness occur with activity or rest, and lasts for a few minutes. Lightheadedness has been occurring daily for the past 2 months. Alert and oriented without apparent distress. Denies current chest pain, dyspnea, lightheadedness, dizziness, and palpitations. Pt denies seeing a cardiologist, hematologist, or nephrologist.       Spoke with Dr. Juwan Kirkland regarding the pt's recent neurological symptoms, abnormal lab values from today, and chest pain. Dr. Juwan Kirkland instructed to call Dr. Arvella Cogan. Called Dr. Baldomero Mayberry office and spoke with Reno Schwab, Silvino Ochoa stated Dr. Arvella Cogan did not think the pt needed to be seen in the ED at this time, but wanted the pt to come to his office tomorrow. Pt stated he could not go to the office tomorrow; but had availability on Friday, 11-. Carly scheduled the pt to be seen at 1330 on 11-. Instructed pt to discuss chest pain, dizziness, lightheadedness, confusion, and rash with his Dr. Arvella Cogan. Stroke symptoms were discussed with the pt. Instructed pt to call 911 if he has stroke symptoms or worsening lightheadedness, dizziness, chest pain, or confusion. Pt and the pt's girlfriend voiced understanding.      Past Medical History:      Past Medical History        Past Medical History:   Diagnosis Date    Bandemia      CAD (coronary artery disease)      Cerebral artery occlusion with cerebral infarction ([address] 2013    COPD (chronic obstructive pulmonary disease) (HCC)      Fracture of metatarsal bone       closed non displaced left foot    Gangrene ([address]       left toe. Pt denies gangrene.     Hypertension      Kidney disease, chronic, stage III (GFR 30-59 ml/min) ([facility])      Moderate malnutrition (HCC)      MRSA (methicillin resistant staph aureus) culture positive 05/02/2018     toe    Pain, lower extremity      Peripheral vascular disease ([facility])      Thrombocytosis ([facility])              Past Surgical History:      Past Surgical History         Past Surgical History:   Procedure Laterality Date    CORONARY ANGIOPLASTY WITH STENT PLACEMENT         Pt denies    OTHER SURGICAL HISTORY   10/26/2018     angiogram    OTHER SURGICAL HISTORY         angiogram with stents in bilateral legs    TOE AMPUTATION Right       Right 5th toe    TONSILLECTOMY                Medications Prior to Admission:      Home Medications           Prior to Admission medications    Medication Sig Start Date End Date Taking?  Authorizing Provider   COMBIVENT RESPIMAT  MCG/ACT AERS inhaler   10/9/18     Historical Provider, MD   Umeclidinium Bromide (INCRUSE ELLIPTA) 62.5 MCG/INH AEPB Inhale 1 puff into the lungs daily 9/21/18     Giles Tran MD   clopidogrel (PLAVIX) 75 MG tablet Take 1 tablet by mouth daily 9/14/18     Giles Tran MD   atorvastatin (LIPITOR) 40 MG tablet Take 1 tablet by mouth daily 9/14/18     Giles Tran MD   amLODIPine (NORVASC) 5 MG tablet Take 1 tablet by mouth daily 9/14/18     Giles Tran MD   metoprolol tartrate (LOPRESSOR) 25 MG tablet Take 1 tablet by mouth 2 times daily 9/14/18     Giles Tran MD   mometasone-formoterol [facility]) 200-5 MCG/ACT inhaler Inhale 2 puffs into the lungs every 12 hours 9/14/18     Giles Tran MD   albuterol sulfate HFA (PROAIR HFA) 108 (90 Base) MCG/ACT inhaler Inhale 2 puffs into the lungs every 6 hours as needed for Wheezing 9/14/18     Taisha Mccall MD   tiotropium (Chandler Hones) 18 MCG inhalation capsule Inhale 1 capsule into the lungs daily 9/14/18     Taisha Mccall MD   Gauze Pads & Dressings (Corky List (L) 98 - 107 mmol/L     CO2 21 20 - 31 mmol/L     Anion Gap 19 (H) 9 - 17 mmol/L     Alkaline Phosphatase 138 (H) 40 - 129 U/L     ALT 46 (H) 5 - 41 U/L     AST 30 <40 U/L     Total Bilirubin 0.97 0.3 - 1.2 mg/dL     Total Protein 9.0 (H) 6.4 - 8.3 g/dL     Alb 5.1 3.5 - 5.2 g/dL     Albumin/Globulin Ratio NOT REPORTED 1.0 - 2.5     GFR Non-African American 47 (L) >60 mL/min     GFR  57 (L) >60 mL/min     GFR Comment            GFR Staging NOT REPORTED     Urinalysis, Routine     Collection Time: 11/27/18  2:36 PM   Result Value Ref Range     Color, UA YELLOW YEL     Turbidity UA SLIGHTLY CLOUDY (A) CLEAR     Glucose, Ur NEGATIVE NEG     Bilirubin Urine NEGATIVE NEG     Ketones, Urine TRACE (A) NEG     Specific Gravity, UA 1.020 1.005 - 1.030     Urine Hgb NEGATIVE NEG     pH, UA 5.5 5.0 - 8.0     Protein, UA 2+ (A) NEG     Urobilinogen, Urine Normal NORM     Nitrite, Urine NEGATIVE NEG     Leukocyte Esterase, Urine NEGATIVE NEG     Urinalysis Comments NOT REPORTED     Microscopic Urinalysis     Collection Time: 11/27/18  2:36 PM   Result Value Ref Range     -            WBC, UA 0 TO 2 0 - 5 /HPF     RBC, UA 0 TO 2 0 - 2 /HPF     Casts UA 0 TO 2 /LPF     Crystals UA NOT REPORTED NONE /HPF     Epithelial Cells UA 0 TO 2 0 - 5 /HPF     Renal Epithelial, Urine NOT REPORTED 0 /HPF     Bacteria, UA NOT REPORTED NONE     Mucus, UA NOT REPORTED NONE     Trichomonas, UA NOT REPORTED NONE     Amorphous, UA NOT REPORTED NONE     Other Observations UA NOT REPORTED NREQ     Yeast, UA NOT REPORTED NONE                Recent Labs      11/27/18   1412   HGB  18.4*   HCT  54.7*   WBC  16.1*   MCV  81.4   NA  137   K  4.9   CL  97*   CO2  21   BUN  24*   CREATININE  1.57*   GLUCOSE  87   INR  1.1   PROTIME  11.6   APTT  33.5*   AST  30   ALT  46*   LABALBU  5.1         Imaging/Diagnostics:     Vascular Lower Arterial Plethysmography Procedure 10/05/2018  Conclusions        Summary        Right OLESYA of 0.46 is Endocrine: Negative for cold intolerance, heat intolerance, polydipsia and polyuria. Genitourinary: Negative for dysuria and frequency. Musculoskeletal: Negative for  Myalgia, back pain, bone pain and arthralgias. Neurological: Negative for dizziness, weakness and headaches.      PHYSICAL EXAM:         /70 (Site: Left Upper Arm, Position: Sitting, Cuff Size: Large Adult)   Pulse 57   Ht 6' 2\" (1.88 m)   Wt 139 lb (63 kg)   BMI 17.85 kg/m²       General appearance - well appearing, not in  distress. Mental status - alert and cooperative . Head: Normocephalic, without obvious abnormality, atraumatic. Eye: PERRL, conjunctiva/corneas clear, EOM's intact. Neck - Supple, no significant adenopathy . Chest - Clear to auscultation, no wheezes, rales or rhonchi, symmetric air entry. Heart -  regular rhythm, normal S1, S2, no murmurs. Abdomen - Soft, nontender, nondistended, no masses or organomegaly. Neurological - Alert, oriented, normal speech, no focal findings or movement disorder note. Extremities -  No pedal edema, no clubbing or cyanosis.            Labs:        Chemistry               Component Value Date/Time      11/27/2018 1412     K 4.9 11/27/2018 1412     CL 97 (L) 11/27/2018 1412     CO2 21 11/27/2018 1412     BUN 24 (H) 11/27/2018 1412     CREATININE 1.57 (H) 11/27/2018 1412               Component Value Date/Time     CALCIUM 10.6 (H) 11/27/2018 1412     ALKPHOS 138 (H) 11/27/2018 1412     AST 30 11/27/2018 1412     ALT 46 (H) 11/27/2018 1412     BILITOT 0.97 11/27/2018 1412                 Recent Labs      11/27/18   1412   GLUCOSE  87            Lab Results   Component Value Date     WBC 16.1 (H) 11/27/2018     HGB 18.4 (H) 11/27/2018     HCT 54.7 (H) 11/27/2018     MCV 81.4 11/27/2018     PLT 1,206 ([facility]) 11/27/2018      No results found for: TSH        Lab Results   Component Value Date     LABA1C 4.9 08/08/2018      No results found for: MICHAEL VELEZ  No components

## 2018-12-13 NOTE — ANESTHESIA PRE PROCEDURE
for Congestion 1 Bottle 3    aspirin 81 MG tablet Take 81 mg by mouth daily      varenicline (CHANTIX CONTINUING MONTH DHRUV) 1 MG tablet Take 1 tablet by mouth 2 times daily 60 tablet 5    traMADol (ULTRAM) 50 MG tablet Take 1 tablet by mouth every 6 hours as needed for Pain for up to 7 days. . 28 tablet 0       Allergies:  No Known Allergies    Problem List:    Patient Active Problem List   Diagnosis Code    Essential hypertension I10    PVD [facility]peripheral vascular disease) [facility] I73.9    Stage 3 chronic kidney disease ([facility] N18.3    Closed nondisplaced fracture of third metatarsal bone of left foot S92.335A    Ischemia of toe I99.8    Critical lower limb ischemia I99.8    Gangrene of toe of left foot ([facility] I96    Bandemia D72.825    Thrombocytosis ([facility]) D47.3    Moderate malnutrition ([facility]) E44.0    Pain of lower extremity M79.606    Smoking F17.200    Ischemia of right lower extremity I99.8    COPD (chronic obstructive pulmonary disease) ([facility]) J44.9       Past Medical History:        Diagnosis Date    Bandemia     CAD (coronary artery disease)     Cerebral artery occlusion with cerebral infarction ([facility] 2013    COPD (chronic obstructive pulmonary disease) ([facility])     Fracture of metatarsal bone     closed non displaced left foot    Gangrene ([facility])     left toe. Pt denies gangrene.     Hypertension     Kidney disease, chronic, stage III (GFR 30-59 ml/min) ([facility])     Moderate malnutrition ([facility])     MRSA (methicillin resistant staph aureus) culture positive 05/02/2018    toe    Pain, lower extremity     Peripheral vascular disease ([facility]     Thrombocytosis ([facility])        Past Surgical History:        Procedure Laterality Date    CORONARY ANGIOPLASTY WITH STENT PLACEMENT      Pt denies    OTHER SURGICAL HISTORY  10/26/2018    angiogram    OTHER SURGICAL HISTORY      angiogram with stents in bilateral legs    TOE AMPUTATION Right     Right 5th toe    TONSILLECTOMY         Social History:    Social History   Substance Use Topics    Smoking status: Former Smoker     Packs/day: 1.00     Years: 35.00     Types: Cigars     Quit date: 5/15/2018    Smokeless tobacco: Never Used    Alcohol use No                                Counseling given: Not Answered      Vital Signs (Current): There were no vitals filed for this visit. BP Readings from Last 3 Encounters:   11/27/18 (!) 139/91   11/30/18 120/70   10/26/18 (!) 161/91       NPO Status:                                                                                 BMI:   Wt Readings from Last 3 Encounters:   11/27/18 137 lb 2 oz (62.2 kg)   11/30/18 139 lb (63 kg)   10/26/18 145 lb 9.6 oz (66 kg)     There is no height or weight on file to calculate BMI.    CBC:   Lab Results   Component Value Date    WBC 16.1 11/27/2018    RBC 6.71 11/27/2018    HGB 18.4 11/27/2018    HCT 54.7 11/27/2018    MCV 81.4 11/27/2018    RDW 15.1 11/27/2018    PLT 1,206 11/27/2018       CMP:   Lab Results   Component Value Date     11/27/2018    K 4.9 11/27/2018    CL 97 11/27/2018    CO2 21 11/27/2018    BUN 24 11/27/2018    CREATININE 1.57 11/27/2018    GFRAA 57 11/27/2018    LABGLOM 47 11/27/2018    GLUCOSE 87 11/27/2018    PROT 9.0 11/27/2018    CALCIUM 10.6 11/27/2018    BILITOT 0.97 11/27/2018    ALKPHOS 138 11/27/2018    AST 30 11/27/2018    ALT 46 11/27/2018       POC Tests: No results for input(s): POCGLU, POCNA, POCK, POCCL, POCBUN, POCHEMO, POCHCT in the last 72 hours.     Coags:   Lab Results   Component Value Date    PROTIME 11.6 11/27/2018    INR 1.1 11/27/2018    APTT 33.5 11/27/2018       HCG (If Applicable): No results found for: PREGTESTUR, PREGSERUM, HCG, HCGQUANT     ABGs: No results found for: PHART, PO2ART, MNR2IMU, ORJ5FDW, BEART, V2PGWVJF     Type & Screen (If Applicable):  No results found for: LABABO, [address]    Anesthesia Evaluation  Patient summary reviewed and Nursing notes reviewed  Airway: Mallampati: II  TM

## 2018-12-14 ENCOUNTER — APPOINTMENT (OUTPATIENT)
Dept: GENERAL RADIOLOGY | Age: 48
DRG: 169 | End: 2018-12-14
Attending: SURGERY
Payer: COMMERCIAL

## 2018-12-14 LAB
-: NORMAL
AMORPHOUS: NORMAL
ANION GAP SERPL CALCULATED.3IONS-SCNC: 12 MMOL/L (ref 9–17)
BACTERIA: NORMAL
BILIRUBIN URINE: NEGATIVE
BNP INTERPRETATION: NORMAL
BUN BLDV-MCNC: 21 MG/DL (ref 6–20)
BUN/CREAT BLD: 16 (ref 9–20)
CALCIUM SERPL-MCNC: 8.4 MG/DL (ref 8.6–10.4)
CASTS UA: NORMAL /LPF
CHLORIDE BLD-SCNC: 107 MMOL/L (ref 98–107)
CO2: 22 MMOL/L (ref 20–31)
COLOR: YELLOW
COMMENT UA: ABNORMAL
CREAT SERPL-MCNC: 1.32 MG/DL (ref 0.7–1.2)
CRYSTALS, UA: NORMAL /HPF
EKG ATRIAL RATE: 108 BPM
EKG P AXIS: 23 DEGREES
EKG P-R INTERVAL: 128 MS
EKG Q-T INTERVAL: 324 MS
EKG QRS DURATION: 88 MS
EKG QTC CALCULATION (BAZETT): 434 MS
EKG R AXIS: -16 DEGREES
EKG T AXIS: 31 DEGREES
EKG VENTRICULAR RATE: 108 BPM
EPITHELIAL CELLS UA: NORMAL /HPF (ref 0–5)
FIO2: 50
GFR AFRICAN AMERICAN: >60 ML/MIN
GFR NON-AFRICAN AMERICAN: 58 ML/MIN
GFR SERPL CREATININE-BSD FRML MDRD: ABNORMAL ML/MIN/{1.73_M2}
GFR SERPL CREATININE-BSD FRML MDRD: ABNORMAL ML/MIN/{1.73_M2}
GLUCOSE BLD-MCNC: 130 MG/DL (ref 70–99)
GLUCOSE URINE: NEGATIVE
HCT VFR BLD CALC: 45 % (ref 41–53)
HEMOGLOBIN: 15.1 G/DL (ref 13.5–17.5)
KETONES, URINE: NEGATIVE
LEUKOCYTE ESTERASE, URINE: NEGATIVE
MCH RBC QN AUTO: 27.4 PG (ref 26–34)
MCHC RBC AUTO-ENTMCNC: 33.5 G/DL (ref 31–37)
MCV RBC AUTO: 81.7 FL (ref 80–100)
MUCUS: NORMAL
NEGATIVE BASE EXCESS, ART: 2 (ref 0–2)
NITRITE, URINE: NEGATIVE
NRBC AUTOMATED: ABNORMAL PER 100 WBC
O2 DEVICE/FLOW/%: ABNORMAL
OTHER OBSERVATIONS UA: NORMAL
PATIENT TEMP: ABNORMAL
PDW BLD-RTO: 15.8 % (ref 11.5–14.5)
PH UA: 6 (ref 5–8)
PLATELET # BLD: 933 K/UL (ref 130–400)
PMV BLD AUTO: 7.1 FL (ref 6–12)
POC HCO3: 23.1 MMOL/L (ref 22–27)
POC O2 SATURATION: 83 %
POC PCO2 TEMP: ABNORMAL MM HG
POC PCO2: 40 MM HG (ref 32–45)
POC PH TEMP: ABNORMAL
POC PH: 7.37 (ref 7.35–7.45)
POC PO2 TEMP: ABNORMAL MM HG
POC PO2: 48 MM HG (ref 75–95)
POSITIVE BASE EXCESS, ART: ABNORMAL (ref 0–2)
POTASSIUM SERPL-SCNC: 5.3 MMOL/L (ref 3.7–5.3)
POTASSIUM SERPL-SCNC: 5.7 MMOL/L (ref 3.7–5.3)
PRO-BNP: 203 PG/ML
PROTEIN UA: ABNORMAL
RBC # BLD: 5.51 M/UL (ref 4.5–5.9)
RBC UA: NORMAL /HPF (ref 0–2)
RENAL EPITHELIAL, UA: NORMAL /HPF
SODIUM BLD-SCNC: 141 MMOL/L (ref 135–144)
SPECIFIC GRAVITY UA: 1.02 (ref 1–1.03)
TCO2 (CALC), ART: 24 MMOL/L (ref 23–28)
TRICHOMONAS: NORMAL
TROPONIN INTERP: NORMAL
TROPONIN T: <0.03 NG/ML
TURBIDITY: CLEAR
URINE HGB: ABNORMAL
UROBILINOGEN, URINE: NORMAL
WBC # BLD: 19.5 K/UL (ref 3.5–11)
WBC UA: NORMAL /HPF (ref 0–5)
YEAST: NORMAL

## 2018-12-14 PROCEDURE — 71045 X-RAY EXAM CHEST 1 VIEW: CPT

## 2018-12-14 PROCEDURE — 81001 URINALYSIS AUTO W/SCOPE: CPT

## 2018-12-14 PROCEDURE — 94660 CPAP INITIATION&MGMT: CPT

## 2018-12-14 PROCEDURE — 84484 ASSAY OF TROPONIN QUANT: CPT

## 2018-12-14 PROCEDURE — 94761 N-INVAS EAR/PLS OXIMETRY MLT: CPT

## 2018-12-14 PROCEDURE — 6360000002 HC RX W HCPCS: Performed by: SURGERY

## 2018-12-14 PROCEDURE — 6370000000 HC RX 637 (ALT 250 FOR IP): Performed by: SURGERY

## 2018-12-14 PROCEDURE — 6360000002 HC RX W HCPCS: Performed by: NURSE PRACTITIONER

## 2018-12-14 PROCEDURE — 93005 ELECTROCARDIOGRAM TRACING: CPT

## 2018-12-14 PROCEDURE — 2580000003 HC RX 258: Performed by: SURGERY

## 2018-12-14 PROCEDURE — 83880 ASSAY OF NATRIURETIC PEPTIDE: CPT

## 2018-12-14 PROCEDURE — 87040 BLOOD CULTURE FOR BACTERIA: CPT

## 2018-12-14 PROCEDURE — 99232 SBSQ HOSP IP/OBS MODERATE 35: CPT | Performed by: INTERNAL MEDICINE

## 2018-12-14 PROCEDURE — 2700000000 HC OXYGEN THERAPY PER DAY

## 2018-12-14 PROCEDURE — 6360000002 HC RX W HCPCS: Performed by: INTERNAL MEDICINE

## 2018-12-14 PROCEDURE — 80048 BASIC METABOLIC PNL TOTAL CA: CPT

## 2018-12-14 PROCEDURE — 94645 CONT INHLJ TX EACH ADDL HOUR: CPT

## 2018-12-14 PROCEDURE — 36600 WITHDRAWAL OF ARTERIAL BLOOD: CPT

## 2018-12-14 PROCEDURE — 2500000003 HC RX 250 WO HCPCS: Performed by: NURSE PRACTITIONER

## 2018-12-14 PROCEDURE — 94640 AIRWAY INHALATION TREATMENT: CPT

## 2018-12-14 PROCEDURE — 85027 COMPLETE CBC AUTOMATED: CPT

## 2018-12-14 PROCEDURE — 82803 BLOOD GASES ANY COMBINATION: CPT

## 2018-12-14 PROCEDURE — 36415 COLL VENOUS BLD VENIPUNCTURE: CPT

## 2018-12-14 PROCEDURE — 84132 ASSAY OF SERUM POTASSIUM: CPT

## 2018-12-14 PROCEDURE — 2000000000 HC ICU R&B

## 2018-12-14 RX ORDER — FUROSEMIDE 10 MG/ML
40 INJECTION INTRAMUSCULAR; INTRAVENOUS ONCE
Status: DISCONTINUED | OUTPATIENT
Start: 2018-12-14 | End: 2018-12-14

## 2018-12-14 RX ORDER — METOPROLOL TARTRATE 5 MG/5ML
2.5 INJECTION INTRAVENOUS EVERY 4 HOURS PRN
Status: DISCONTINUED | OUTPATIENT
Start: 2018-12-14 | End: 2018-12-14

## 2018-12-14 RX ORDER — FUROSEMIDE 10 MG/ML
20 INJECTION INTRAMUSCULAR; INTRAVENOUS ONCE
Status: COMPLETED | OUTPATIENT
Start: 2018-12-14 | End: 2018-12-14

## 2018-12-14 RX ORDER — LABETALOL HYDROCHLORIDE 5 MG/ML
5 INJECTION, SOLUTION INTRAVENOUS
Status: DISCONTINUED | OUTPATIENT
Start: 2018-12-14 | End: 2018-12-19 | Stop reason: HOSPADM

## 2018-12-14 RX ORDER — LORAZEPAM 2 MG/ML
0.5 INJECTION INTRAMUSCULAR EVERY 4 HOURS PRN
Status: DISCONTINUED | OUTPATIENT
Start: 2018-12-14 | End: 2018-12-15

## 2018-12-14 RX ORDER — BUDESONIDE 0.5 MG/2ML
0.5 INHALANT ORAL 2 TIMES DAILY
Status: DISCONTINUED | OUTPATIENT
Start: 2018-12-14 | End: 2018-12-15

## 2018-12-14 RX ORDER — LEVALBUTEROL 1.25 MG/.5ML
1.25 SOLUTION, CONCENTRATE RESPIRATORY (INHALATION) EVERY 8 HOURS PRN
Status: DISCONTINUED | OUTPATIENT
Start: 2018-12-14 | End: 2018-12-14

## 2018-12-14 RX ORDER — SODIUM CHLORIDE FOR INHALATION 0.9 %
3 VIAL, NEBULIZER (ML) INHALATION EVERY 8 HOURS PRN
Status: DISCONTINUED | OUTPATIENT
Start: 2018-12-14 | End: 2018-12-15 | Stop reason: SDUPTHER

## 2018-12-14 RX ORDER — LEVALBUTEROL 1.25 MG/.5ML
1.25 SOLUTION, CONCENTRATE RESPIRATORY (INHALATION) EVERY 4 HOURS
Status: DISCONTINUED | OUTPATIENT
Start: 2018-12-14 | End: 2018-12-15

## 2018-12-14 RX ORDER — ALBUTEROL SULFATE 2.5 MG/3ML
2.5 SOLUTION RESPIRATORY (INHALATION)
Status: DISCONTINUED | OUTPATIENT
Start: 2018-12-14 | End: 2018-12-14

## 2018-12-14 RX ORDER — METHYLPREDNISOLONE SODIUM SUCCINATE 125 MG/2ML
60 INJECTION, POWDER, LYOPHILIZED, FOR SOLUTION INTRAMUSCULAR; INTRAVENOUS ONCE
Status: COMPLETED | OUTPATIENT
Start: 2018-12-14 | End: 2018-12-14

## 2018-12-14 RX ADMIN — MOMETASONE FUROATE AND FORMOTEROL FUMARATE DIHYDRATE 2 PUFF: 200; 5 AEROSOL RESPIRATORY (INHALATION) at 19:56

## 2018-12-14 RX ADMIN — FAMOTIDINE 20 MG: 20 TABLET ORAL at 08:02

## 2018-12-14 RX ADMIN — DOCUSATE SODIUM 100 MG: 100 CAPSULE, LIQUID FILLED ORAL at 20:32

## 2018-12-14 RX ADMIN — AMLODIPINE BESYLATE 5 MG: 5 TABLET ORAL at 08:02

## 2018-12-14 RX ADMIN — HYDROMORPHONE HYDROCHLORIDE 1 MG: 1 INJECTION, SOLUTION INTRAMUSCULAR; INTRAVENOUS; SUBCUTANEOUS at 23:53

## 2018-12-14 RX ADMIN — LABETALOL HYDROCHLORIDE 5 MG: 5 INJECTION, SOLUTION INTRAVENOUS at 21:18

## 2018-12-14 RX ADMIN — HYDROCODONE BITARTRATE AND ACETAMINOPHEN 2 TABLET: 5; 325 TABLET ORAL at 09:59

## 2018-12-14 RX ADMIN — HYDROCODONE BITARTRATE AND ACETAMINOPHEN 2 TABLET: 5; 325 TABLET ORAL at 16:32

## 2018-12-14 RX ADMIN — LEVALBUTEROL 1.25 MG: 1.25 SOLUTION, CONCENTRATE RESPIRATORY (INHALATION) at 21:30

## 2018-12-14 RX ADMIN — IPRATROPIUM BROMIDE 0.5 MG: 0.5 SOLUTION RESPIRATORY (INHALATION) at 21:31

## 2018-12-14 RX ADMIN — HYDROMORPHONE HYDROCHLORIDE 1 MG: 1 INJECTION, SOLUTION INTRAMUSCULAR; INTRAVENOUS; SUBCUTANEOUS at 12:29

## 2018-12-14 RX ADMIN — TIOTROPIUM BROMIDE 18 MCG: 18 CAPSULE ORAL; RESPIRATORY (INHALATION) at 10:22

## 2018-12-14 RX ADMIN — ONDANSETRON 4 MG: 2 INJECTION INTRAMUSCULAR; INTRAVENOUS at 03:59

## 2018-12-14 RX ADMIN — HYDROMORPHONE HYDROCHLORIDE 1 MG: 1 INJECTION, SOLUTION INTRAMUSCULAR; INTRAVENOUS; SUBCUTANEOUS at 00:12

## 2018-12-14 RX ADMIN — LORAZEPAM 0.5 MG: 2 INJECTION INTRAMUSCULAR; INTRAVENOUS at 21:10

## 2018-12-14 RX ADMIN — FUROSEMIDE 20 MG: 10 INJECTION, SOLUTION INTRAMUSCULAR; INTRAVENOUS at 08:02

## 2018-12-14 RX ADMIN — LEVALBUTEROL 1.25 MG: 1.25 SOLUTION, CONCENTRATE RESPIRATORY (INHALATION) at 23:31

## 2018-12-14 RX ADMIN — ALBUTEROL SULFATE 2.5 MG: 2.5 SOLUTION RESPIRATORY (INHALATION) at 19:56

## 2018-12-14 RX ADMIN — BUDESONIDE 500 MCG: 0.5 SUSPENSION RESPIRATORY (INHALATION) at 21:31

## 2018-12-14 RX ADMIN — ASPIRIN 81 MG: 81 TABLET, COATED ORAL at 08:02

## 2018-12-14 RX ADMIN — CEFAZOLIN SODIUM 2 G: 1 INJECTION, POWDER, FOR SOLUTION INTRAMUSCULAR; INTRAVENOUS at 03:51

## 2018-12-14 RX ADMIN — METOPROLOL TARTRATE 25 MG: 25 TABLET ORAL at 20:32

## 2018-12-14 RX ADMIN — METOPROLOL TARTRATE 25 MG: 25 TABLET ORAL at 08:02

## 2018-12-14 RX ADMIN — HYDROMORPHONE HYDROCHLORIDE 1 MG: 1 INJECTION, SOLUTION INTRAMUSCULAR; INTRAVENOUS; SUBCUTANEOUS at 17:48

## 2018-12-14 RX ADMIN — METHYLPREDNISOLONE SODIUM SUCCINATE 60 MG: 125 INJECTION, POWDER, FOR SOLUTION INTRAMUSCULAR; INTRAVENOUS at 21:47

## 2018-12-14 RX ADMIN — ATORVASTATIN CALCIUM 40 MG: 40 TABLET, FILM COATED ORAL at 20:32

## 2018-12-14 RX ADMIN — HYDROMORPHONE HYDROCHLORIDE 1 MG: 1 INJECTION, SOLUTION INTRAMUSCULAR; INTRAVENOUS; SUBCUTANEOUS at 20:53

## 2018-12-14 RX ADMIN — FAMOTIDINE 20 MG: 20 TABLET ORAL at 20:32

## 2018-12-14 RX ADMIN — MOMETASONE FUROATE AND FORMOTEROL FUMARATE DIHYDRATE 2 PUFF: 200; 5 AEROSOL RESPIRATORY (INHALATION) at 10:25

## 2018-12-14 RX ADMIN — HYDROMORPHONE HYDROCHLORIDE 1 MG: 1 INJECTION, SOLUTION INTRAMUSCULAR; INTRAVENOUS; SUBCUTANEOUS at 03:51

## 2018-12-14 RX ADMIN — DOCUSATE SODIUM 100 MG: 100 CAPSULE, LIQUID FILLED ORAL at 08:02

## 2018-12-14 RX ADMIN — Medication 10 ML: at 22:02

## 2018-12-14 RX ADMIN — HYDROMORPHONE HYDROCHLORIDE 2 MG: 1 INJECTION, SOLUTION INTRAMUSCULAR; INTRAVENOUS; SUBCUTANEOUS at 08:01

## 2018-12-14 RX ADMIN — ENOXAPARIN SODIUM 40 MG: 40 INJECTION SUBCUTANEOUS at 08:01

## 2018-12-14 ASSESSMENT — PAIN DESCRIPTION - PROGRESSION
CLINICAL_PROGRESSION: NOT CHANGED
CLINICAL_PROGRESSION: GRADUALLY IMPROVING
CLINICAL_PROGRESSION: NOT CHANGED
CLINICAL_PROGRESSION: RAPIDLY WORSENING
CLINICAL_PROGRESSION: NOT CHANGED
CLINICAL_PROGRESSION: NOT CHANGED

## 2018-12-14 ASSESSMENT — PAIN DESCRIPTION - ONSET
ONSET: ON-GOING

## 2018-12-14 ASSESSMENT — PAIN DESCRIPTION - DESCRIPTORS
DESCRIPTORS: ACHING
DESCRIPTORS: ACHING;CONSTANT
DESCRIPTORS: ACHING;CONSTANT
DESCRIPTORS: ACHING
DESCRIPTORS: ACHING;CONSTANT

## 2018-12-14 ASSESSMENT — PAIN DESCRIPTION - PAIN TYPE
TYPE: ACUTE PAIN
TYPE: ACUTE PAIN
TYPE: SURGICAL PAIN
TYPE: ACUTE PAIN;SURGICAL PAIN
TYPE: SURGICAL PAIN
TYPE: ACUTE PAIN;SURGICAL PAIN
TYPE: SURGICAL PAIN

## 2018-12-14 ASSESSMENT — PAIN DESCRIPTION - LOCATION
LOCATION: ABDOMEN

## 2018-12-14 ASSESSMENT — PAIN DESCRIPTION - ORIENTATION
ORIENTATION: OTHER (COMMENT)
ORIENTATION: OTHER (COMMENT)
ORIENTATION: MID
ORIENTATION: OTHER (COMMENT)
ORIENTATION: MID

## 2018-12-14 ASSESSMENT — PAIN SCALES - GENERAL
PAINLEVEL_OUTOF10: 9
PAINLEVEL_OUTOF10: 6
PAINLEVEL_OUTOF10: 8
PAINLEVEL_OUTOF10: 10
PAINLEVEL_OUTOF10: 6
PAINLEVEL_OUTOF10: 9
PAINLEVEL_OUTOF10: 8
PAINLEVEL_OUTOF10: 10
PAINLEVEL_OUTOF10: 9

## 2018-12-14 ASSESSMENT — PAIN DESCRIPTION - FREQUENCY
FREQUENCY: CONTINUOUS

## 2018-12-14 NOTE — DISCHARGE INSTR - COC
Continuity of Care Form    Patient Name: Radha Ricketts   :  1970  MRN:  [id number]    Admit date:  2018  Discharge date:  18    Code Status Order: Full Code   Advance Directives:   Advance Care Flowsheet Documentation     Date/Time Healthcare Directive Type of Healthcare Directive Copy in [address] Agent's Name Healthcare Agent's Phone Number    18 1828  No, patient does not have an advance directive for healthcare treatment -- -- -- -- --    18 0846  No, patient does not have an advance directive for healthcare treatment -- -- -- -- --    18 0845  No, patient does not have an advance directive for healthcare treatment -- -- -- -- --          Admitting Physician:  Mojgan High MD  PCP: Cesar Stern MD    Discharging Nurse: Creta Renetta, [address] Unit/Room#: 2031/2031-01  Discharging Unit Phone Number: [phone]    Emergency Contact:   Extended Emergency Contact Information  Primary Emergency Contact: Rekha Garcia  Address: [address] Phone: [phone]  Mobile Phone: [phone]  Relation: Other    Past Surgical History:  Past Surgical History:   Procedure Laterality Date    AORTO-FEMORAL BYPASS GRAFT  2018    bifemoral bypass    AORTOFEMORAL-POPLITEAL BYPASS GRAFT N/A 2018    AORTO BIFEMORAL BYPASS  -CELLSAVER performed by Mojgan High MD at [place] [address]      Pt denies    OTHER SURGICAL HISTORY  10/26/2018    angiogram    OTHER SURGICAL HISTORY      angiogram with stents in bilateral legs    TOE AMPUTATION Right     Right 5th toe    TONSILLECTOMY         Immunization History:   Immunization History   Administered Date(s) Administered    Pneumococcal Polysaccharide ([id number]) 2018    Tdap (Boostrix, Adacel) 2018       Active Problems:  Patient Active Problem List

## 2018-12-14 NOTE — PROGRESS NOTES
[address] [facility]    Progress Note    12/14/2018    1:05 PM    Name:   Perry Madrigal  MRN:     [id number]     [place]:      [de-identified]   Room:   2031/2031-01  IP Day:  1  Admit Date:  12/13/2018  8:17 AM    PCP:   Apollo Carlson MD  Code Status:  Full Code    Subjective:     C/C: No chief complaint on file. Interval History Status:   Patient is post operative, he has chronic chest pain for the last 1 year in he says he follows with his family doctor for that, denies shortness of breath, no coughing, no other acute issues, potassium is slightly elevated    Brief History:       The patient is a 34-year-old male that was admitted postoperative aorto bifemoral bypass  by Dr. Hayley Bennett. The patient reports that prior to surgery he had progressively worsening pain over the last 1-1/2 years to his right leg that is aggravated by walking. He rates this pain and 8/10. He denies numbness or tingling to his lower extremities but does report some swelling. Postoperatively he continues to be painful. At this time his pain medications are being changed from morphine to Dilaudid in the hopes that that will be more beneficial. The patient has a history of COPD, CAD, stage III chronic kidney disease, malnutrition, smoking and leukocytosis      Review of Systems:     Constitutional:  negative for chills, fevers, sweats  Respiratory:  negative for cough, dyspnea on exertion, shortness of breath, wheezing  Cardiovascular:  negativefor chest pressure/discomfort, lower extremity edema, palpitations  Gastrointestinal:  negative for abdominal pain, constipation, diarrhea, nausea, vomiting  Neurological:  negative for dizziness, headache    Medications:      Allergies:  No Known Allergies    Current Meds:   Scheduled Meds:    aspirin  81 mg Oral Daily    atorvastatin  40 mg Oral Nightly    amLODIPine  5 mg Oral Daily    metoprolol tartrate  25 mg Oral BID    (24Hr): Intake/Output Summary (Last 24 hours) at 12/14/18 1305  Last data filed at 12/14/18 1000   Gross per 24 hour   Intake             4289 ml   Output             2550 ml   Net             1739 ml       Labs:    Hematology:  Recent Labs      12/14/18   0510   WBC  19.5*   RBC  5.51   HGB  15.1   HCT  45.0   MCV  81.7   MCH  27.4   MCHC  33.5   RDW  15.8*   PLT  933*   MPV  7.1     Chemistry:  Recent Labs      12/13/18   1930  12/14/18   0510  12/14/18   0945  12/14/18   1230   NA  141  141   --    --    K  4.6  5.7*   --   5.3   CL  108*  107   --    --    CO2  21  22   --    --    GLUCOSE  180*  130*   --    --    BUN  24*  21*   --    --    CREATININE  1.26*  1.32*   --    --    ANIONGAP  12  12   --    --    LABGLOM  >60  58*   --    --    GFRAA  >60  >60   --    --    CALCIUM  8.5*  8.4*   --    --    TROPONINT   --    --   <0.03  <0.03     No results for input(s): PROT, LABALBU, LABA1C, B8QPSKS, O9PJEHC, FT4, TSH, AST, ALT, LDH, GGT, ALKPHOS, LABGGT, BILITOT, BILIDIR, AMMONIA, AMYLASE, LIPASE, LACTATE, CHOL, HDL, LDLCHOLESTEROL, CHOLHDLRATIO, TRIG, VLDL, IGX29US, PHENYTOIN, PHENYF, URICACID, POCGLU in the last 72 hours.       Lab Results   Component Value Date/Time    SPECIAL NOT REPORTED 05/02/2018 09:12 PM     Lab Results   Component Value Date/Time    CULTURE (A) 05/02/2018 09:12 PM     METHICILLIN RESISTANT STAPHYLOCOCCUS AUREUS HEAVY GROWTH    CULTURE  05/02/2018 09:12 PM     [employer] [address] [facility], [address] [place] [address] [phone]       Lab Results   Component Value Date    POCPH 7.23 12/13/2018    POCPCO2 55 12/13/2018    POCPO2 148 12/13/2018    POCHCO3 23.3 12/13/2018    NBEA 4 12/13/2018    PBEA NOT REPORTED 12/13/2018    VGA1TSM 25 12/13/2018    TUNU8BTN 99 12/13/2018    FIO2 100.0 12/13/2018       Radiology:    Xr Chest 1 Vw    Result Date: 12/14/2018  EXAMINATION: SINGLE XRAY VIEW OF THE CHEST 12/14/2018 9:19 am COMPARISON: Two-view chest from 04/12/2019 HISTORY: [address]

## 2018-12-14 NOTE — PLAN OF CARE
[address] [place] [address]    Second Visit Note  For more detailed information please refer to the progress note of the day      12/14/2018    5:01 PM    Name:   Carol Gold  MRN:     [id number]     [place]:      [de-identified]   Room:   2031/2031-01   Day:  1  Admit Date:  12/13/2018  8:17 AM    PCP:   Conor Cochran MD  Code Status:  Full Code        Pt vitals were reviewed   New labs were reviewed   Patient was seen    Updated plan :     1. Breathing is stable  2. Troponins were negative,  3. IV fluids decreased  4.  Potassium is better        Rachel Lr MD  12/14/2018  5:01 PM

## 2018-12-14 NOTE — CONSULTS
 Kidney disease, chronic, stage III (GFR 30-59 ml/min) ([facility]) [N18.3]     Hypertension [I10]     CAD (coronary artery disease) [I25.10]     COPD (chronic obstructive pulmonary disease) ([facility] [J44.9] 10/05/2018    Moderate malnutrition ([facility] [E44.0] 05/01/2018    Thrombocytosis ([facility] [D47.3]        Plan:     1. Patient admitted per Dr. Peggy Penaloza  2. Pain medications being switched from morphine to Dilaudid to try to manage pain more efficiently  3. Encourage incentive spirometry, coughing and deep breathing  4. DVT prophylaxis  5. Antibiotics per vascular  6. Appropriate home medications resumed including those for COPD and CAD  7. Keep NPO  8. Activity per vascular  9. Monitor vital signs  10. CBC/BMP ordered for the morning. Follow platelets, wbc and creatine  11. Oxygen therapy protocol initiated  12. Maintain NG per vascular  13.  Thank you for the consult, will continue to follow    Consultations:   SUSANA Sykes - CNP  12/14/2018  1:49 AM    Copy sent to Dr. Mala Mathis MD

## 2018-12-14 NOTE — PROGRESS NOTES
Patient calls out states he needs pain medicine. Norco 2 tabs given at 1630, states when he coughs it hurst and he's trying not to cough. He says he needs the \"shot to cough. \" Explained to patient coughing may produce pain regardless of pain meds on board because he just had surgery and he has a large incision. RN gives patient dilaudid per patient request, patient states when he has \"the shot\" the \"fucking machine works\" (points at the IS) and he has no pain. He said he can't breathe without the \"shot. \" Dr Jasmin Chi notified of patient complaints.  Kian Stapleton RN

## 2018-12-15 LAB
ABSOLUTE EOS #: 0.1 K/UL (ref 0–0.4)
ABSOLUTE IMMATURE GRANULOCYTE: ABNORMAL K/UL (ref 0–0.3)
ABSOLUTE LYMPH #: 0.6 K/UL (ref 1–4.8)
ABSOLUTE MONO #: 0.9 K/UL (ref 0.2–0.8)
ANION GAP SERPL CALCULATED.3IONS-SCNC: 9 MMOL/L (ref 9–17)
BASOPHILS # BLD: 0 % (ref 0–2)
BASOPHILS ABSOLUTE: 0 K/UL (ref 0–0.2)
BUN BLDV-MCNC: 16 MG/DL (ref 6–20)
BUN/CREAT BLD: 15 (ref 9–20)
CALCIUM SERPL-MCNC: 8.5 MG/DL (ref 8.6–10.4)
CHLORIDE BLD-SCNC: 104 MMOL/L (ref 98–107)
CO2: 24 MMOL/L (ref 20–31)
CREAT SERPL-MCNC: 1.04 MG/DL (ref 0.7–1.2)
DIFFERENTIAL TYPE: ABNORMAL
EOSINOPHILS RELATIVE PERCENT: 1 % (ref 1–4)
GFR AFRICAN AMERICAN: >60 ML/MIN
GFR NON-AFRICAN AMERICAN: >60 ML/MIN
GFR SERPL CREATININE-BSD FRML MDRD: ABNORMAL ML/MIN/{1.73_M2}
GFR SERPL CREATININE-BSD FRML MDRD: ABNORMAL ML/MIN/{1.73_M2}
GLUCOSE BLD-MCNC: 128 MG/DL (ref 70–99)
HCT VFR BLD CALC: 37.2 % (ref 41–53)
HEMOGLOBIN: 12.6 G/DL (ref 13.5–17.5)
IMMATURE GRANULOCYTES: ABNORMAL %
LV EF: 60 %
LVEF MODALITY: NORMAL
LYMPHOCYTES # BLD: 3 % (ref 24–44)
MCH RBC QN AUTO: 27.8 PG (ref 26–34)
MCHC RBC AUTO-ENTMCNC: 33.8 G/DL (ref 31–37)
MCV RBC AUTO: 82.2 FL (ref 80–100)
MONOCYTES # BLD: 6 % (ref 1–7)
NRBC AUTOMATED: ABNORMAL PER 100 WBC
PDW BLD-RTO: 16.1 % (ref 11.5–14.5)
PLATELET # BLD: 728 K/UL (ref 130–400)
PLATELET ESTIMATE: ABNORMAL
PMV BLD AUTO: 6.7 FL (ref 6–12)
POTASSIUM SERPL-SCNC: 4.7 MMOL/L (ref 3.7–5.3)
PROCALCITONIN: 0.4 NG/ML
RBC # BLD: 4.52 M/UL (ref 4.5–5.9)
RBC # BLD: ABNORMAL 10*6/UL
SEG NEUTROPHILS: 90 % (ref 36–66)
SEGMENTED NEUTROPHILS ABSOLUTE COUNT: 14.8 K/UL (ref 1.8–7.7)
SODIUM BLD-SCNC: 137 MMOL/L (ref 135–144)
WBC # BLD: 16.5 K/UL (ref 3.5–11)
WBC # BLD: ABNORMAL 10*3/UL

## 2018-12-15 PROCEDURE — 85025 COMPLETE CBC W/AUTO DIFF WBC: CPT

## 2018-12-15 PROCEDURE — 94761 N-INVAS EAR/PLS OXIMETRY MLT: CPT

## 2018-12-15 PROCEDURE — 6360000002 HC RX W HCPCS: Performed by: INTERNAL MEDICINE

## 2018-12-15 PROCEDURE — 94660 CPAP INITIATION&MGMT: CPT

## 2018-12-15 PROCEDURE — 2580000003 HC RX 258: Performed by: INTERNAL MEDICINE

## 2018-12-15 PROCEDURE — 6360000002 HC RX W HCPCS: Performed by: NURSE PRACTITIONER

## 2018-12-15 PROCEDURE — 6360000002 HC RX W HCPCS: Performed by: SURGERY

## 2018-12-15 PROCEDURE — 6370000000 HC RX 637 (ALT 250 FOR IP): Performed by: SURGERY

## 2018-12-15 PROCEDURE — 84145 PROCALCITONIN (PCT): CPT

## 2018-12-15 PROCEDURE — 2000000000 HC ICU R&B

## 2018-12-15 PROCEDURE — 80048 BASIC METABOLIC PNL TOTAL CA: CPT

## 2018-12-15 PROCEDURE — 94640 AIRWAY INHALATION TREATMENT: CPT

## 2018-12-15 PROCEDURE — 2500000003 HC RX 250 WO HCPCS: Performed by: INTERNAL MEDICINE

## 2018-12-15 PROCEDURE — 2700000000 HC OXYGEN THERAPY PER DAY

## 2018-12-15 PROCEDURE — 93306 TTE W/DOPPLER COMPLETE: CPT

## 2018-12-15 PROCEDURE — 99232 SBSQ HOSP IP/OBS MODERATE 35: CPT | Performed by: INTERNAL MEDICINE

## 2018-12-15 PROCEDURE — 2580000003 HC RX 258: Performed by: SURGERY

## 2018-12-15 RX ORDER — BUDESONIDE 0.5 MG/2ML
0.5 INHALANT ORAL 2 TIMES DAILY
Status: DISCONTINUED | OUTPATIENT
Start: 2018-12-14 | End: 2018-12-16

## 2018-12-15 RX ORDER — LORAZEPAM 2 MG/ML
0.5 INJECTION INTRAMUSCULAR
Status: DISCONTINUED | OUTPATIENT
Start: 2018-12-15 | End: 2018-12-19 | Stop reason: HOSPADM

## 2018-12-15 RX ORDER — LEVALBUTEROL 1.25 MG/.5ML
1.25 SOLUTION, CONCENTRATE RESPIRATORY (INHALATION) EVERY 4 HOURS
Status: DISCONTINUED | OUTPATIENT
Start: 2018-12-14 | End: 2018-12-19 | Stop reason: HOSPADM

## 2018-12-15 RX ORDER — SODIUM CHLORIDE FOR INHALATION 0.9 %
3 VIAL, NEBULIZER (ML) INHALATION EVERY 8 HOURS PRN
Status: DISCONTINUED | OUTPATIENT
Start: 2018-12-15 | End: 2018-12-19 | Stop reason: HOSPADM

## 2018-12-15 RX ADMIN — TAZOBACTAM SODIUM AND PIPERACILLIN SODIUM 3.38 G: 375; 3 INJECTION, SOLUTION INTRAVENOUS at 09:17

## 2018-12-15 RX ADMIN — ATORVASTATIN CALCIUM 40 MG: 40 TABLET, FILM COATED ORAL at 19:46

## 2018-12-15 RX ADMIN — IPRATROPIUM BROMIDE 0.5 MG: 0.5 SOLUTION RESPIRATORY (INHALATION) at 07:27

## 2018-12-15 RX ADMIN — BUDESONIDE 500 MCG: 0.5 SUSPENSION RESPIRATORY (INHALATION) at 18:24

## 2018-12-15 RX ADMIN — LEVALBUTEROL 1.25 MG: 1.25 SOLUTION, CONCENTRATE RESPIRATORY (INHALATION) at 23:02

## 2018-12-15 RX ADMIN — LEVALBUTEROL 1.25 MG: 1.25 SOLUTION, CONCENTRATE RESPIRATORY (INHALATION) at 07:28

## 2018-12-15 RX ADMIN — HYDROMORPHONE HYDROCHLORIDE 1 MG: 1 INJECTION, SOLUTION INTRAMUSCULAR; INTRAVENOUS; SUBCUTANEOUS at 17:56

## 2018-12-15 RX ADMIN — DEXMEDETOMIDINE HYDROCHLORIDE 0.3 MCG/KG/HR: 100 INJECTION, SOLUTION INTRAVENOUS at 03:10

## 2018-12-15 RX ADMIN — ENOXAPARIN SODIUM 40 MG: 40 INJECTION SUBCUTANEOUS at 09:17

## 2018-12-15 RX ADMIN — IPRATROPIUM BROMIDE 0.5 MG: 0.5 SOLUTION RESPIRATORY (INHALATION) at 18:24

## 2018-12-15 RX ADMIN — Medication 10 ML: at 21:00

## 2018-12-15 RX ADMIN — LEVALBUTEROL 1.25 MG: 1.25 SOLUTION, CONCENTRATE RESPIRATORY (INHALATION) at 11:22

## 2018-12-15 RX ADMIN — DEXMEDETOMIDINE HYDROCHLORIDE 0.4 MCG/KG/HR: 100 INJECTION, SOLUTION INTRAVENOUS at 13:58

## 2018-12-15 RX ADMIN — LORAZEPAM 0.5 MG: 2 INJECTION INTRAMUSCULAR; INTRAVENOUS at 00:25

## 2018-12-15 RX ADMIN — LEVALBUTEROL 1.25 MG: 1.25 SOLUTION, CONCENTRATE RESPIRATORY (INHALATION) at 15:56

## 2018-12-15 RX ADMIN — DOCUSATE SODIUM 100 MG: 100 CAPSULE, LIQUID FILLED ORAL at 19:45

## 2018-12-15 RX ADMIN — METOPROLOL TARTRATE 25 MG: 25 TABLET ORAL at 19:46

## 2018-12-15 RX ADMIN — FAMOTIDINE 20 MG: 20 TABLET ORAL at 19:46

## 2018-12-15 RX ADMIN — IPRATROPIUM BROMIDE 0.5 MG: 0.5 SOLUTION RESPIRATORY (INHALATION) at 15:56

## 2018-12-15 RX ADMIN — HYDROMORPHONE HYDROCHLORIDE 2 MG: 1 INJECTION, SOLUTION INTRAMUSCULAR; INTRAVENOUS; SUBCUTANEOUS at 21:15

## 2018-12-15 RX ADMIN — LEVALBUTEROL 1.25 MG: 1.25 SOLUTION, CONCENTRATE RESPIRATORY (INHALATION) at 03:24

## 2018-12-15 RX ADMIN — LEVALBUTEROL 1.25 MG: 1.25 SOLUTION, CONCENTRATE RESPIRATORY (INHALATION) at 18:24

## 2018-12-15 RX ADMIN — TAZOBACTAM SODIUM AND PIPERACILLIN SODIUM 3.38 G: 375; 3 INJECTION, SOLUTION INTRAVENOUS at 17:08

## 2018-12-15 RX ADMIN — ASPIRIN 81 MG: 81 TABLET, COATED ORAL at 13:57

## 2018-12-15 RX ADMIN — IPRATROPIUM BROMIDE 0.5 MG: 0.5 SOLUTION RESPIRATORY (INHALATION) at 03:23

## 2018-12-15 RX ADMIN — IPRATROPIUM BROMIDE 0.5 MG: 0.5 SOLUTION RESPIRATORY (INHALATION) at 11:21

## 2018-12-15 RX ADMIN — BUDESONIDE 500 MCG: 0.5 SUSPENSION RESPIRATORY (INHALATION) at 07:27

## 2018-12-15 ASSESSMENT — PAIN DESCRIPTION - ONSET
ONSET: ON-GOING

## 2018-12-15 ASSESSMENT — PAIN DESCRIPTION - PAIN TYPE
TYPE: ACUTE PAIN

## 2018-12-15 ASSESSMENT — PAIN DESCRIPTION - ORIENTATION
ORIENTATION: MID

## 2018-12-15 ASSESSMENT — PAIN SCALES - GENERAL
PAINLEVEL_OUTOF10: 6
PAINLEVEL_OUTOF10: 7
PAINLEVEL_OUTOF10: 6
PAINLEVEL_OUTOF10: 0
PAINLEVEL_OUTOF10: 8
PAINLEVEL_OUTOF10: 0
PAINLEVEL_OUTOF10: 4
PAINLEVEL_OUTOF10: 5
PAINLEVEL_OUTOF10: 0

## 2018-12-15 ASSESSMENT — PAIN DESCRIPTION - FREQUENCY
FREQUENCY: CONTINUOUS

## 2018-12-15 ASSESSMENT — PAIN DESCRIPTION - PROGRESSION

## 2018-12-15 ASSESSMENT — PAIN DESCRIPTION - DESCRIPTORS
DESCRIPTORS: ACHING

## 2018-12-15 ASSESSMENT — PAIN DESCRIPTION - LOCATION
LOCATION: ABDOMEN

## 2018-12-15 NOTE — CONSULTS
ondansetron, HYDROmorphone **OR** HYDROmorphone  IV Drips/Infusions   dexmedetomidine (PRECEDEX) IV infusion 0.4 mcg/kg/hr (12/15/18 1016)     Home Medications  Prescriptions Prior to Admission: Umeclidinium Bromide (INCRUSE ELLIPTA) 62.5 MCG/INH AEPB, Inhale 1 puff into the lungs daily  atorvastatin (LIPITOR) 40 MG tablet, Take 1 tablet by mouth daily  amLODIPine (NORVASC) 5 MG tablet, Take 1 tablet by mouth daily  metoprolol tartrate (LOPRESSOR) 25 MG tablet, Take 1 tablet by mouth 2 times daily  mometasone-formoterol (DULERA) 200-5 MCG/ACT inhaler, Inhale 2 puffs into the lungs every 12 hours  albuterol sulfate HFA (PROAIR HFA) 108 (90 Base) MCG/ACT inhaler, Inhale 2 puffs into the lungs every 6 hours as needed for Wheezing  sodium chloride (OCEAN NASAL SPRAY) 0.65 % nasal spray, 1 spray by Nasal route as needed for Congestion  COMBIVENT RESPIMAT  MCG/ACT AERS inhaler,   clopidogrel (PLAVIX) 75 MG tablet, Take 1 tablet by mouth daily  tiotropium (SPIRIVA HANDIHALER) 18 MCG inhalation capsule, Inhale 1 capsule into the lungs daily  Gauze Pads & Dressings (KERLIX GAUZE ROLL LARGE) MISC, 30 kerlix rolls for daily dressing changes  Gauze Pads & Dressings (GAUZE DRESSING) 4\"X4\" PADS, Please dispense 100 gauze pads for patient's daily dressing changes  Gauze Pads & Dressings (KERLIX GAUZE ROLL SMALL) MISC, Apply to foot  aspirin 81 MG tablet, Take 81 mg by mouth daily  varenicline (CHANTIX CONTINUING MONTH DHRUV) 1 MG tablet, Take 1 tablet by mouth 2 times daily  traMADol (ULTRAM) 50 MG tablet, Take 1 tablet by mouth every 6 hours as needed for Pain for up to 7 days. .    Allergies    Patient has no known allergies. Social History     Social History     Social History    Marital status:      Spouse name: N/A    Number of children: N/A    Years of education: N/A     Occupational History    Not on file.      Social History Main Topics    Smoking status: Former Smoker     Packs/day: 1.00     Years: 35.00

## 2018-12-15 NOTE — FLOWSHEET NOTE
12/15/18 1120   Provider Notification   Reason for Communication Evaluate; Review case   Provider Name Dr. Jonas Diallo   Provider Notification Physician   Method of Communication Face to face   Response At bedside   Notification Time 1120     Dr. Jonas Diallo at the bedside to evaluate patient. MD updated that patient has been off bipap this AM. Oxygen weaned to 3L and patient tolerating well. MD updated that precedex drip has been weaned down but patient was trying to get out of bed. MD also updated regarding platelet count elevated. (933 on 12/14/2018 and 728 today). MD to review chart and place further orders. Will continue to monitor.

## 2018-12-15 NOTE — PROGRESS NOTES
Pt o2 sat dropped to 82 with good wave form. Pt on 4L on NC. Respiratory called to evaluate patient and give breathing treatment. Pt 02 still in the low 80s. Gustavo Stahl cnp notified. CXR ordered, respiratory increased o2 to 5L and recommended poss high flow NC (bipap would be a bad choice due to incision made in surgery yesterday). Awaiting CXR results before next intervention. Redman Sleeper in house CNP in room to evaluate pt. Pt now on high flow at 80%, cxr still pending. Dr Master Harrington consulted. Order for bipap, solumedrol, bnp,  Pulmicort, atrovent, xopenex and nacl nebulizer given. Pt now NPO. Has not eaten since surgery but has had ice chips, water, and coffee.     Electronically signed by Jerri Sever, RN on 12/14/2018 at 8:13 PM

## 2018-12-15 NOTE — FLOWSHEET NOTE
12/15/18 1500   Provider Notification   Reason for Communication Evaluate;New orders; Review case   Provider Name Dr. Ascencion Goodman   Provider Notification Physician   Method of Communication Face to face   Response At bedside   Notification Time 1500     Dr. Ascencion Goodman at the bedside to evaluate patient. MD updated regarding need for increased oxygen and precedex drip overnight. Precedex stopped at this time. All dressing removed. Incision sites cleansed with betadine. Order received to ambulate patient to the chair today. Okay for patient to receive clear liquids. If patient tolerates, diet can be advanced to full and then general tomorrow. Order received to remove central line. Will continue to monitor.

## 2018-12-16 ENCOUNTER — APPOINTMENT (OUTPATIENT)
Dept: GENERAL RADIOLOGY | Age: 48
DRG: 169 | End: 2018-12-16
Attending: SURGERY
Payer: COMMERCIAL

## 2018-12-16 PROBLEM — Z72.0 TOBACCO ABUSE: Status: ACTIVE | Noted: 2018-05-18

## 2018-12-16 LAB
ABSOLUTE EOS #: 0.4 K/UL (ref 0–0.4)
ABSOLUTE IMMATURE GRANULOCYTE: ABNORMAL K/UL (ref 0–0.3)
ABSOLUTE LYMPH #: 1.2 K/UL (ref 1–4.8)
ABSOLUTE MONO #: 1.5 K/UL (ref 0.2–0.8)
ANION GAP SERPL CALCULATED.3IONS-SCNC: 16 MMOL/L (ref 9–17)
BASOPHILS # BLD: 0 % (ref 0–2)
BASOPHILS ABSOLUTE: 0 K/UL (ref 0–0.2)
BUN BLDV-MCNC: 15 MG/DL (ref 6–20)
BUN/CREAT BLD: 14 (ref 9–20)
CALCIUM SERPL-MCNC: 8.7 MG/DL (ref 8.6–10.4)
CHLORIDE BLD-SCNC: 97 MMOL/L (ref 98–107)
CO2: 23 MMOL/L (ref 20–31)
CREAT SERPL-MCNC: 1.09 MG/DL (ref 0.7–1.2)
DIFFERENTIAL TYPE: ABNORMAL
EOSINOPHILS RELATIVE PERCENT: 3 % (ref 1–4)
GFR AFRICAN AMERICAN: >60 ML/MIN
GFR NON-AFRICAN AMERICAN: >60 ML/MIN
GFR SERPL CREATININE-BSD FRML MDRD: ABNORMAL ML/MIN/{1.73_M2}
GFR SERPL CREATININE-BSD FRML MDRD: ABNORMAL ML/MIN/{1.73_M2}
GLUCOSE BLD-MCNC: 97 MG/DL (ref 70–99)
HCT VFR BLD CALC: 37.5 % (ref 41–53)
HEMOGLOBIN: 12.9 G/DL (ref 13.5–17.5)
IMMATURE GRANULOCYTES: ABNORMAL %
LYMPHOCYTES # BLD: 8 % (ref 24–44)
MCH RBC QN AUTO: 27.8 PG (ref 26–34)
MCHC RBC AUTO-ENTMCNC: 34.5 G/DL (ref 31–37)
MCV RBC AUTO: 80.6 FL (ref 80–100)
MONOCYTES # BLD: 10 % (ref 1–7)
NRBC AUTOMATED: ABNORMAL PER 100 WBC
PDW BLD-RTO: 16 % (ref 11.5–14.5)
PLATELET # BLD: 807 K/UL (ref 130–400)
PLATELET ESTIMATE: ABNORMAL
PMV BLD AUTO: 6.5 FL (ref 6–12)
POTASSIUM SERPL-SCNC: 4.2 MMOL/L (ref 3.7–5.3)
RBC # BLD: 4.65 M/UL (ref 4.5–5.9)
RBC # BLD: ABNORMAL 10*6/UL
SEG NEUTROPHILS: 79 % (ref 36–66)
SEGMENTED NEUTROPHILS ABSOLUTE COUNT: 11.9 K/UL (ref 1.8–7.7)
SODIUM BLD-SCNC: 136 MMOL/L (ref 135–144)
WBC # BLD: 15 K/UL (ref 3.5–11)
WBC # BLD: ABNORMAL 10*3/UL

## 2018-12-16 PROCEDURE — 99232 SBSQ HOSP IP/OBS MODERATE 35: CPT | Performed by: INTERNAL MEDICINE

## 2018-12-16 PROCEDURE — 6370000000 HC RX 637 (ALT 250 FOR IP): Performed by: SURGERY

## 2018-12-16 PROCEDURE — 51702 INSERT TEMP BLADDER CATH: CPT

## 2018-12-16 PROCEDURE — 6360000002 HC RX W HCPCS: Performed by: INTERNAL MEDICINE

## 2018-12-16 PROCEDURE — 80048 BASIC METABOLIC PNL TOTAL CA: CPT

## 2018-12-16 PROCEDURE — 97110 THERAPEUTIC EXERCISES: CPT

## 2018-12-16 PROCEDURE — G8987 SELF CARE CURRENT STATUS: HCPCS

## 2018-12-16 PROCEDURE — 99255 IP/OBS CONSLTJ NEW/EST HI 80: CPT | Performed by: INTERNAL MEDICINE

## 2018-12-16 PROCEDURE — 94664 DEMO&/EVAL PT USE INHALER: CPT

## 2018-12-16 PROCEDURE — G8979 MOBILITY GOAL STATUS: HCPCS

## 2018-12-16 PROCEDURE — 97162 PT EVAL MOD COMPLEX 30 MIN: CPT

## 2018-12-16 PROCEDURE — 97530 THERAPEUTIC ACTIVITIES: CPT

## 2018-12-16 PROCEDURE — 2500000003 HC RX 250 WO HCPCS: Performed by: INTERNAL MEDICINE

## 2018-12-16 PROCEDURE — 71045 X-RAY EXAM CHEST 1 VIEW: CPT

## 2018-12-16 PROCEDURE — 87641 MR-STAPH DNA AMP PROBE: CPT

## 2018-12-16 PROCEDURE — 6370000000 HC RX 637 (ALT 250 FOR IP): Performed by: INTERNAL MEDICINE

## 2018-12-16 PROCEDURE — 85025 COMPLETE CBC W/AUTO DIFF WBC: CPT

## 2018-12-16 PROCEDURE — 97166 OT EVAL MOD COMPLEX 45 MIN: CPT

## 2018-12-16 PROCEDURE — G8988 SELF CARE GOAL STATUS: HCPCS

## 2018-12-16 PROCEDURE — G8978 MOBILITY CURRENT STATUS: HCPCS

## 2018-12-16 PROCEDURE — 36415 COLL VENOUS BLD VENIPUNCTURE: CPT

## 2018-12-16 PROCEDURE — 6360000002 HC RX W HCPCS: Performed by: SURGERY

## 2018-12-16 PROCEDURE — 94761 N-INVAS EAR/PLS OXIMETRY MLT: CPT

## 2018-12-16 PROCEDURE — 2000000000 HC ICU R&B

## 2018-12-16 PROCEDURE — 2700000000 HC OXYGEN THERAPY PER DAY

## 2018-12-16 PROCEDURE — 94640 AIRWAY INHALATION TREATMENT: CPT

## 2018-12-16 PROCEDURE — 6360000002 HC RX W HCPCS: Performed by: NURSE PRACTITIONER

## 2018-12-16 RX ADMIN — HYDROCODONE BITARTRATE AND ACETAMINOPHEN 2 TABLET: 5; 325 TABLET ORAL at 07:02

## 2018-12-16 RX ADMIN — IPRATROPIUM BROMIDE 0.5 MG: 0.5 SOLUTION RESPIRATORY (INHALATION) at 19:32

## 2018-12-16 RX ADMIN — TAZOBACTAM SODIUM AND PIPERACILLIN SODIUM 3.38 G: 375; 3 INJECTION, SOLUTION INTRAVENOUS at 08:54

## 2018-12-16 RX ADMIN — HYDROCODONE BITARTRATE AND ACETAMINOPHEN 2 TABLET: 5; 325 TABLET ORAL at 16:29

## 2018-12-16 RX ADMIN — ATORVASTATIN CALCIUM 40 MG: 40 TABLET, FILM COATED ORAL at 21:24

## 2018-12-16 RX ADMIN — LEVALBUTEROL 1.25 MG: 1.25 SOLUTION, CONCENTRATE RESPIRATORY (INHALATION) at 15:40

## 2018-12-16 RX ADMIN — LEVALBUTEROL 1.25 MG: 1.25 SOLUTION, CONCENTRATE RESPIRATORY (INHALATION) at 07:39

## 2018-12-16 RX ADMIN — ENOXAPARIN SODIUM 40 MG: 40 INJECTION SUBCUTANEOUS at 08:33

## 2018-12-16 RX ADMIN — HYDROMORPHONE HYDROCHLORIDE 1 MG: 1 INJECTION, SOLUTION INTRAMUSCULAR; INTRAVENOUS; SUBCUTANEOUS at 00:22

## 2018-12-16 RX ADMIN — DOCUSATE SODIUM 100 MG: 100 CAPSULE, LIQUID FILLED ORAL at 21:25

## 2018-12-16 RX ADMIN — HYDROCODONE BITARTRATE AND ACETAMINOPHEN 2 TABLET: 5; 325 TABLET ORAL at 11:04

## 2018-12-16 RX ADMIN — LEVALBUTEROL 1.25 MG: 1.25 SOLUTION, CONCENTRATE RESPIRATORY (INHALATION) at 03:19

## 2018-12-16 RX ADMIN — DOCUSATE SODIUM 100 MG: 100 CAPSULE, LIQUID FILLED ORAL at 08:33

## 2018-12-16 RX ADMIN — FAMOTIDINE 20 MG: 20 TABLET ORAL at 21:00

## 2018-12-16 RX ADMIN — TAZOBACTAM SODIUM AND PIPERACILLIN SODIUM 3.38 G: 375; 3 INJECTION, SOLUTION INTRAVENOUS at 00:22

## 2018-12-16 RX ADMIN — METOPROLOL TARTRATE 25 MG: 25 TABLET ORAL at 21:24

## 2018-12-16 RX ADMIN — IPRATROPIUM BROMIDE 0.5 MG: 0.5 SOLUTION RESPIRATORY (INHALATION) at 11:10

## 2018-12-16 RX ADMIN — IPRATROPIUM BROMIDE 0.5 MG: 0.5 SOLUTION RESPIRATORY (INHALATION) at 15:40

## 2018-12-16 RX ADMIN — FAMOTIDINE 20 MG: 20 TABLET ORAL at 08:33

## 2018-12-16 RX ADMIN — LEVALBUTEROL 1.25 MG: 1.25 SOLUTION, CONCENTRATE RESPIRATORY (INHALATION) at 19:32

## 2018-12-16 RX ADMIN — LEVALBUTEROL 1.25 MG: 1.25 SOLUTION, CONCENTRATE RESPIRATORY (INHALATION) at 11:09

## 2018-12-16 RX ADMIN — HYDROMORPHONE HYDROCHLORIDE 2 MG: 1 INJECTION, SOLUTION INTRAMUSCULAR; INTRAVENOUS; SUBCUTANEOUS at 04:02

## 2018-12-16 RX ADMIN — TAZOBACTAM SODIUM AND PIPERACILLIN SODIUM 3.38 G: 375; 3 INJECTION, SOLUTION INTRAVENOUS at 16:29

## 2018-12-16 RX ADMIN — IPRATROPIUM BROMIDE 0.5 MG: 0.5 SOLUTION RESPIRATORY (INHALATION) at 03:20

## 2018-12-16 RX ADMIN — BUDESONIDE 500 MCG: 0.5 SUSPENSION RESPIRATORY (INHALATION) at 07:39

## 2018-12-16 RX ADMIN — HYDROCODONE BITARTRATE AND ACETAMINOPHEN 2 TABLET: 5; 325 TABLET ORAL at 23:25

## 2018-12-16 RX ADMIN — METOPROLOL TARTRATE 25 MG: 25 TABLET ORAL at 08:33

## 2018-12-16 RX ADMIN — AMLODIPINE BESYLATE 5 MG: 5 TABLET ORAL at 08:33

## 2018-12-16 RX ADMIN — Medication 2 PUFF: at 19:32

## 2018-12-16 RX ADMIN — IPRATROPIUM BROMIDE 0.5 MG: 0.5 SOLUTION RESPIRATORY (INHALATION) at 07:39

## 2018-12-16 RX ADMIN — HYDROCODONE BITARTRATE AND ACETAMINOPHEN 2 TABLET: 5; 325 TABLET ORAL at 20:25

## 2018-12-16 RX ADMIN — ASPIRIN 81 MG: 81 TABLET, COATED ORAL at 08:33

## 2018-12-16 ASSESSMENT — PAIN DESCRIPTION - FREQUENCY
FREQUENCY: INTERMITTENT
FREQUENCY: CONTINUOUS

## 2018-12-16 ASSESSMENT — PAIN DESCRIPTION - LOCATION
LOCATION: ABDOMEN

## 2018-12-16 ASSESSMENT — PAIN DESCRIPTION - ONSET
ONSET: ON-GOING

## 2018-12-16 ASSESSMENT — PAIN DESCRIPTION - PROGRESSION
CLINICAL_PROGRESSION: NOT CHANGED
CLINICAL_PROGRESSION: GRADUALLY IMPROVING
CLINICAL_PROGRESSION: RAPIDLY IMPROVING
CLINICAL_PROGRESSION: GRADUALLY IMPROVING
CLINICAL_PROGRESSION: NOT CHANGED
CLINICAL_PROGRESSION: GRADUALLY IMPROVING
CLINICAL_PROGRESSION: GRADUALLY WORSENING
CLINICAL_PROGRESSION: GRADUALLY IMPROVING
CLINICAL_PROGRESSION: NOT CHANGED
CLINICAL_PROGRESSION: GRADUALLY WORSENING
CLINICAL_PROGRESSION: GRADUALLY IMPROVING
CLINICAL_PROGRESSION: NOT CHANGED

## 2018-12-16 ASSESSMENT — PAIN DESCRIPTION - ORIENTATION
ORIENTATION: MID

## 2018-12-16 ASSESSMENT — PAIN SCALES - GENERAL
PAINLEVEL_OUTOF10: 7
PAINLEVEL_OUTOF10: 3
PAINLEVEL_OUTOF10: 3
PAINLEVEL_OUTOF10: 7
PAINLEVEL_OUTOF10: 6
PAINLEVEL_OUTOF10: 3
PAINLEVEL_OUTOF10: 8
PAINLEVEL_OUTOF10: 7
PAINLEVEL_OUTOF10: 8
PAINLEVEL_OUTOF10: 5
PAINLEVEL_OUTOF10: 4
PAINLEVEL_OUTOF10: 8
PAINLEVEL_OUTOF10: 5
PAINLEVEL_OUTOF10: 6
PAINLEVEL_OUTOF10: 5
PAINLEVEL_OUTOF10: 5

## 2018-12-16 ASSESSMENT — PAIN DESCRIPTION - DESCRIPTORS
DESCRIPTORS: ACHING
DESCRIPTORS: ACHING;DULL
DESCRIPTORS: ACHING
DESCRIPTORS: ACHING
DESCRIPTORS: ACHING;DULL
DESCRIPTORS: ACHING
DESCRIPTORS: ACHING

## 2018-12-16 ASSESSMENT — PAIN DESCRIPTION - PAIN TYPE
TYPE: ACUTE PAIN
TYPE: SURGICAL PAIN;ACUTE PAIN
TYPE: ACUTE PAIN
TYPE: ACUTE PAIN;SURGICAL PAIN
TYPE: ACUTE PAIN
TYPE: ACUTE PAIN;SURGICAL PAIN
TYPE: ACUTE PAIN
TYPE: CHRONIC PAIN;SURGICAL PAIN
TYPE: ACUTE PAIN;SURGICAL PAIN

## 2018-12-16 NOTE — CONSULTS
_                         Today's Date: 12/16/2018  Patient Name: Perry Madrigal  Date of admission: 12/13/2018  8:17 AM  Patient's age: 51 y.o., 1970  Admission Dx: Aortoiliac occlusive disease ([facility] [I74.09]      Requesting Physician: Jesus Montoya MD    CHIEF COMPLAINT:  Status post elective Aorto- bifemoral bypass. Consult for persistent leukocytosis and thrombocytosis. History Obtained From:  patient, electronic medical record    HISTORY OF PRESENT ILLNESS:      The patient is a 51 y.o.  male who is admitted to the hospital for elective aortobifemoral bypass surgery. For the essentially patient had problems with increasing shortness of breath and hypoxia. Pulmonary has been consulted. Patient was noted to have  Persistent elevation of white blood cells and platelets. The patient states that  He was told about this problem back in November 2017 in [place]. Obviously no further workup was done. Repeated labs recently showed elevation of white blood cells and platelets. In addition he had normocytic anemia. Patient denies any fever or night sweats. No weight loss or decreased appetite. No palpable lymph nodes. Patient quit smoking 7 months ago. He was a heavy smoker 35 years. He uses marijuana. Denies alcohol drinking. Past Medical History:   has a past medical history of Bandemia; CAD (coronary artery disease); Cerebral artery occlusion with cerebral infarction [facility]); COPD (chronic obstructive pulmonary disease) ([facility] Fracture of metatarsal bone; Gangrene ([facility] Hypertension; Kidney disease, chronic, stage III (GFR 30-59 ml/min) ([address] Moderate malnutrition ([address] MRSA (methicillin resistant staph aureus) culture positive; Pain, lower extremity; Peripheral vascular disease ([address] and Thrombocytosis ([address]     Past Surgical History:   has a past surgical history that includes Coronary angioplasty with stent; other surgical history (10/26/2018); other surgical history; Tonsillectomy; Toe amputation (Right); Aorto-femoral Bypass Graft (12/13/2018); and aortofemoral-popliteal bypass graft (N/A, 12/13/2018). Family History: family history includes Diabetes in his mother; Heart Attack in his father, maternal grandmother, maternal uncle, and paternal grandfather. Social History:   reports that he quit smoking about 7 months ago. His smoking use included Cigars. He has a 35.00 pack-year smoking history. He has never used smokeless tobacco. He reports that he uses drugs, including Marijuana. He reports that he does not drink alcohol. Medications:    Prior to Admission medications    Medication Sig Start Date End Date Taking?  Authorizing Provider   Umeclidinium Bromide (INCRUSE ELLIPTA) 62.5 MCG/INH AEPB Inhale 1 puff into the lungs daily 9/21/18  Yes Giana Meenndez MD   atorvastatin (LIPITOR) 40 MG tablet Take 1 tablet by mouth daily 9/14/18  Yes Giana Menendez MD   amLODIPine (NORVASC) 5 MG tablet Take 1 tablet by mouth daily 9/14/18  Yes Giana Menendez MD   metoprolol tartrate (LOPRESSOR) 25 MG tablet Take 1 tablet by mouth 2 times daily 9/14/18  Yes Giana Menendez MD   mometasone-formoterol [facility]) 200-5 MCG/ACT inhaler Inhale 2 puffs into the lungs every 12 hours 9/14/18  Yes Giana Menendez MD   albuterol sulfate HFA (PROAIR HFA) 108 (90 Base) MCG/ACT inhaler Inhale 2 puffs into the lungs every 6 hours as needed for Wheezing 9/14/18  Yes Giana Menendez MD   sodium chloride (OCEAN NASAL SPRAY) 0.65 % nasal spray 1 spray by Nasal route as needed for Congestion 8/8/18  Yes Michael Toth MD   COMBIVENT RESPIMAT  MCG/ACT AERS inhaler  10/9/18   Historical Provider, MD   clopidogrel (PLAVIX) 75 MG tablet Take 1 tablet by mouth daily 9/14/18   Giana Menendez MD   tiotropium (SPIRIVA HANDIHALER) 18 MCG inhalation capsule Inhale 1 capsule into the lungs daily 9/14/18   MD Darlene Waite Madhav Artis MD   81 mg at 12/16/18 7754    sodium chloride (OCEAN, BABY AYR) 0.65 % nasal spray 1 spray  1 spray Nasal PRN Madhav Artis MD        lidocaine (LMX) 4 % cream   Topical PRN Madhav Artis MD        atorvastatin (LIPITOR) tablet 40 mg  40 mg Oral Nightly Madhav Artis MD   40 mg at 12/15/18 1946    amLODIPine (NORVASC) tablet 5 mg  5 mg Oral Daily Madhav Artis MD   5 mg at 12/16/18 3435    metoprolol tartrate (LOPRESSOR) tablet 25 mg  25 mg Oral BID Madhav Artis MD   25 mg at 12/16/18 8437    albuterol sulfate  (90 Base) MCG/ACT inhaler 2 puff  2 puff Inhalation Q6H PRN Madhav Artis MD        sodium chloride flush 0.9 % injection 10 mL  10 mL Intravenous 2 times per day Madhav Artis MD   10 mL at 12/15/18 2100    sodium chloride flush 0.9 % injection 10 mL  10 mL Intravenous PRN Madhav Artis MD        acetaminophen (TYLENOL) tablet 650 mg  650 mg Oral Q4H PRN Madhav Artis MD        enoxaparin (LOVENOX) injection 40 mg  40 mg Subcutaneous Daily Madhav Artis MD   40 mg at 12/16/18 0919    HYDROcodone-acetaminophen (NORCO) 5-325 MG per tablet 1 tablet  1 tablet Oral Q4H PRN Madhav Artis MD        Or    HYDROcodone-acetaminophen [facility]) 5-325 MG per tablet 2 tablet  2 tablet Oral Q4H PRN Madhav Artis MD   2 tablet at 12/16/18 1104    docusate sodium (COLACE) capsule 100 mg  100 mg Oral BID Madhav Artis MD   100 mg at 12/16/18 7951    famotidine (PEPCID) tablet 20 mg  20 mg Oral BID Madhav Artis MD   20 mg at 12/16/18 7143    hydrALAZINE (APRESOLINE) injection 10 mg  10 mg Intravenous Q1H PRN Madhav Artis MD        ondansetron (ZOFRAN-ODT) disintegrating tablet 4 mg  4 mg Oral Q6H PRN Madhav Artis MD        Or    ondansetron [facility]) injection 4 mg  4 mg Intravenous Q6H PRN Madhav Artis MD   4 mg at 12/14/18 0359    HYDROmorphone (DILAUDID) injection 1 mg  1 mg

## 2018-12-16 NOTE — PROGRESS NOTES
Vascular Surgery   Progress Note    12/16/2018 5:21 PM  Subjective:   Admit Date: 12/13/2018  PCP: Reggie Rock MD  Interval History: Feeling better. Tolerating soft diet. Diet: DIET GENERAL;    Medications:   Scheduled Meds:   mometasone-formoterol  2 puff Inhalation BID    ipratropium  0.5 mg Nebulization Q4H    levalbuterol  1.25 mg Nebulization Q4H    piperacillin-tazobactam  3.375 g Intravenous Q8H    aspirin  81 mg Oral Daily    atorvastatin  40 mg Oral Nightly    amLODIPine  5 mg Oral Daily    metoprolol tartrate  25 mg Oral BID    sodium chloride flush  10 mL Intravenous 2 times per day    enoxaparin  40 mg Subcutaneous Daily    docusate sodium  100 mg Oral BID    famotidine  20 mg Oral BID     Continuous Infusions:   dexmedetomidine (PRECEDEX) IV infusion Stopped (12/15/18 1503)         Labs:   CBC:   Recent Labs      12/14/18   0510  12/15/18   0440  12/16/18   0916   WBC  19.5*  16.5*  15.0*   HGB  15.1  12.6*  12.9*   PLT  933*  728*  807*     BMP:    Recent Labs      12/14/18   0510  12/14/18   1230  12/15/18   0440  12/16/18   0916   NA  141   --   137  136   K  5.7*  5.3  4.7  4.2   CL  107   --   104  97*   CO2  22   --   24  23   BUN  21*   --   16  15   CREATININE  1.32*   --   1.04  1.09   GLUCOSE  130*   --   128*  97     Hepatic: No results for input(s): AST, ALT, ALB, BILITOT, ALKPHOS in the last 72 hours. Troponin: Invalid input(s): TROPONIN  BNP: No results for input(s): BNP in the last 72 hours. Lipids: No results for input(s): CHOL, HDL in the last 72 hours. Invalid input(s): LDLCALCU  INR: No results for input(s): INR in the last 72 hours.     Objective:   Vitals: /86   Pulse 86   Temp 98.1 °F (36.7 °C) (Temporal)   Resp 18   Ht 6' 2\" (1.88 m)   Wt 137 lb 2 oz (62.2 kg)   SpO2 93%   BMI 17.61 kg/m²   General appearance: alert, cooperative and no distress  Mental Status: oriented to person, place and time with normal affect  Neck: good carotid pulses,

## 2018-12-16 NOTE — FLOWSHEET NOTE
12/16/18 1110   Provider Notification   Reason for Communication Review case   Provider Name Dr. Severiano Membreno   Provider Notification Physician   Method of Communication Call   Response No new orders   Notification Time 1110     Dr. Severiano Membreno notified regarding purple appearance to right foot. Pulses were found per doppler. Patient states minimal pain. Pain is noted in fifth amputated digit. Patient in the chair and feet raised. MD states to continue to monitor pulses and call with changes. No new orders at this time.

## 2018-12-16 NOTE — PROGRESS NOTES
Good  Sitting - Dynamic: Good  Standing - Static: Fair  Standing - Dynamic: Fair;-        Assessment   Body structures, Functions, Activity limitations: Decreased functional mobility ; Decreased strength;Decreased endurance;Decreased balance  Assessment: 51 y.o. male s/p Aortofemoral bypass 12/13/18 demonstrates functional deficits and impairments and would benefit from skilled PT to progress toward goals. Prognosis: Good  Decision Making: Medium Complexity  History: PVD, CAD, cerebral artery occlusion, Metatarsal fx, MRSA, coronary antioplasty with stent  Exam: rom, strength balance, functional assessment, [facility]  Clinical Presentation: stable  Patient Education: PT POC, safe mobility  Barriers to Learning: None  REQUIRES PT FOLLOW UP: Yes  Activity Tolerance  Activity Tolerance: Patient limited by pain; Patient limited by endurance         Plan   Plan  Times per week: 1-2x/day, 5-6x/week  Current Treatment Recommendations: Strengthening, Balance Training, Functional Mobility Training, Transfer Training, Gait Training, Stair training, Safety Education & Training  Safety Devices  Type of devices: Chair alarm in place, Left in chair, Nurse notified, Patient at risk for falls  Restraints  Initially in place: No    G-Code  PT G-Codes  Functional Assessment Tool Used: [facility]  Score: 12  Functional Limitation: Mobility: Walking and moving around  Mobility: Walking and Moving Around Current Status (): At least 40 percent but less than 60 percent impaired, limited or restricted  Mobility: Walking and Moving Around Goal Status ():  At least 1 percent but less than 20 percent impaired, limited or restricted  OutComes Score             Goals  Short term goals  Time Frame for Short term goals: 20 visits  Short term goal 1: Bed mob supervision  Short term goal 2: Transfers supervision  Short term goal 3: Ambulate 75 feet with 2ww and supervision  Short term goal 4: 3 steps with supervision  Short term goal

## 2018-12-16 NOTE — PROGRESS NOTES
[facility]    Progress Note    12/16/2018    12:29 PM    Name:   Nabor Marques  MRN:     [id number]     [place]:      [de-identified]   Room:   2031/2031-01   Day:  3  Admit Date:  12/13/2018  8:17 AM    PCP:   Reggie Rock MD  Code Status:  Full Code    Subjective:     C/C: No chief complaint on file. Interval History Status:   Patient had issues with low sats last night, this morning he is comfortable sitting up in chair, no chest pain, no bleeding, he has chronic leukocytosis and thrombocytosis,                      Brief History:       The patient is a 50-year-old male that was admitted postoperative aorto bifemoral bypass  by Dr. Remi Tolbert. The patient reports that prior to surgery he had progressively worsening pain over the last 1-1/2 years to his right leg that is aggravated by walking. He rates this pain and 8/10. He denies numbness or tingling to his lower extremities but does report some swelling. Postoperatively he continues to be painful. At this time his pain medications are being changed from morphine to Dilaudid in the hopes that that will be more beneficial. The patient has a history of COPD, CAD, stage III chronic kidney disease, malnutrition, smoking and leukocytosis  Patient had issues with hypoxia postoperatively. No chest pains. Pulmonology was evaluating the patient as well. Review of Systems:     Constitutional:  negative for chills, fevers, sweats  Respiratory:  negative for cough, dyspnea on exertion, shortness of breath, wheezing  Cardiovascular:  negativefor chest pressure/discomfort, lower extremity edema, palpitations  Gastrointestinal:  negative for abdominal pain, constipation, diarrhea, nausea, vomiting  Neurological:  negative for dizziness, headache    Medications:      Allergies:  No Known Allergies    Current Meds:   Scheduled Meds:    mometasone-formoterol  2 puff Inhalation BID    ipratropium  0.5 mg Results   Component Value Date/Time    CULTURE NO GROWTH 2 DAYS 12/14/2018 04:53 PM    CULTURE NO GROWTH 2 DAYS 12/14/2018 04:53 PM       Lab Results   Component Value Date    POCPH 7.37 12/14/2018    POCPCO2 40 12/14/2018    POCPO2 48 12/14/2018    POCHCO3 23.1 12/14/2018    NBEA 2 12/14/2018    PBEA NOT REPORTED 12/14/2018    WQP4UKM 24 12/14/2018    WBGX8DMF 83 12/14/2018    FIO2 50.0 12/14/2018       Radiology:    Xr Chest 1 Vw    Result Date: 12/14/2018  EXAMINATION: SINGLE XRAY VIEW OF THE CHEST 12/14/2018 9:19 am COMPARISON: Two-view chest from 04/12/2019 HISTORY: ORDERING SYSTEM PROVIDED HISTORY: chest pain TECHNOLOGIST PROVIDED HISTORY: chest pain Ordering Physician Provided Reason for Exam: sob Acuity: Acute Type of Exam: Unknown Additional signs and symptoms: n/a Relevant Medical/Surgical History: n/a Additional history of hypertension, chronic obstructive pulmonary disease, kidney disease, coronary artery disease, and MRSA. FINDINGS: New right IJ catheter with its tip in the lower SVC. Overlying ECG monitor leads and gown snaps. Prominent but unchanged cardiac silhouette, within normal limits for AP portable technique. Mediastinal structures midline unchanged. Mild basilar atelectasis and interstitial changes, with probable Kerley B-lines, best seen right base laterally. Mild cephalization of blood flow. No confluent opacity or large pleural effusion. Minimal unchanged blunting left lateral CP angle. No pneumothorax. Bones and soft tissues stable. Right IJ central line with tip lower SVC. No pneumothorax. Basilar atelectasis and mild interstitial changes, slightly greater on the right; consider crowding markings, pneumonitis or mild edema superimposed on COPD. No consolidation or large pleural effusion.      Physical Examination:        General appearance:  alert, cooperative and no distress  Mental Status:  oriented to person, place and time and normal affect  Lungs:  Bilateral air entry, diminished at bases,  Heart:  regular rate and rhythm, no murmur  Abdomen:  soft, nontender, nondistended, normal bowel sounds, no masses, hepatomegaly, splenomegaly  Extremities:  no edema, redness, tenderness in the calves  Skin:  no gross lesions, rashes, induration    Assessment:        Primary Problem  Peripheral vascular disease [facility])    Active Hospital Problems    Diagnosis Date Noted    Marijuana user [F12.90] 12/13/2018    Peripheral vascular disease ([facility] [I73.9]     Pain, lower extremity [M79.606]     Kidney disease, chronic, stage III (GFR 30-59 ml/min) ([facility] [N18.3]     Hypertension [I10]     CAD (coronary artery disease) [I25.10]     COPD (chronic obstructive pulmonary disease) ([facility] [J44.9] 10/05/2018    Moderate malnutrition ([facility] [E44.0] 05/01/2018    Thrombocytosis ([facility] [D47.3]        Plan:        1. Patient's saturations are better, continue oxygen support, pulmonology/critical care Is evaluating the patient, pt has leukocytosis, he does have chronic leukocytosis but this is more than his baseline, I had started him on Zosyn   2. Consider Lasix,   3. Creatinine is stable, it is actually better than his baseline, he needs to follow with his PCP  4.  He does have leukocytosis and thrombocytosis, this has been persistent, he will benefit from hematology evaluation   5. reviewed his family doctor's notes, patient apparently had stress test in May of this year which was normal    Iman Monsivais MD  12/16/2018  12:29 PM

## 2018-12-17 ENCOUNTER — APPOINTMENT (OUTPATIENT)
Dept: GENERAL RADIOLOGY | Age: 48
DRG: 169 | End: 2018-12-17
Attending: SURGERY
Payer: COMMERCIAL

## 2018-12-17 LAB
ABSOLUTE EOS #: 0.7 K/UL (ref 0–0.4)
ABSOLUTE IMMATURE GRANULOCYTE: ABNORMAL K/UL (ref 0–0.3)
ABSOLUTE LYMPH #: 1.1 K/UL (ref 1–4.8)
ABSOLUTE MONO #: 1.4 K/UL (ref 0.2–0.8)
ABSOLUTE RETIC #: 0.11 M/UL (ref 0.02–0.1)
ANION GAP SERPL CALCULATED.3IONS-SCNC: 16 MMOL/L (ref 9–17)
BASOPHILS # BLD: 2 % (ref 0–2)
BASOPHILS ABSOLUTE: 0.3 K/UL (ref 0–0.2)
BUN BLDV-MCNC: 15 MG/DL (ref 6–20)
BUN/CREAT BLD: 14 (ref 9–20)
CALCIUM SERPL-MCNC: 9 MG/DL (ref 8.6–10.4)
CHLORIDE BLD-SCNC: 98 MMOL/L (ref 98–107)
CO2: 25 MMOL/L (ref 20–31)
CREAT SERPL-MCNC: 1.07 MG/DL (ref 0.7–1.2)
DIFFERENTIAL TYPE: ABNORMAL
EOSINOPHILS RELATIVE PERCENT: 5 % (ref 1–4)
FOLATE: 19.4 NG/ML
GFR AFRICAN AMERICAN: >60 ML/MIN
GFR NON-AFRICAN AMERICAN: >60 ML/MIN
GFR SERPL CREATININE-BSD FRML MDRD: NORMAL ML/MIN/{1.73_M2}
GFR SERPL CREATININE-BSD FRML MDRD: NORMAL ML/MIN/{1.73_M2}
GLUCOSE BLD-MCNC: 91 MG/DL (ref 70–99)
HAPTOGLOBIN: 377 MG/DL (ref 30–200)
HCT VFR BLD CALC: 40.3 % (ref 41–53)
HEMOGLOBIN: 13.7 G/DL (ref 13.5–17.5)
IMMATURE GRANULOCYTES: ABNORMAL %
IMMATURE RETIC FRACT: ABNORMAL %
IRON SATURATION: 12 % (ref 20–55)
IRON: 27 UG/DL (ref 59–158)
LACTATE DEHYDROGENASE: 227 U/L (ref 135–225)
LYMPHOCYTES # BLD: 8 % (ref 24–44)
MCH RBC QN AUTO: 27.9 PG (ref 26–34)
MCHC RBC AUTO-ENTMCNC: 33.9 G/DL (ref 31–37)
MCV RBC AUTO: 82.3 FL (ref 80–100)
MONOCYTES # BLD: 10 % (ref 1–7)
MRSA, DNA, NASAL: NORMAL
NRBC AUTOMATED: ABNORMAL PER 100 WBC
PDW BLD-RTO: 16 % (ref 11.5–14.5)
PLATELET # BLD: 861 K/UL (ref 130–400)
PLATELET ESTIMATE: ABNORMAL
PMV BLD AUTO: 7 FL (ref 6–12)
POTASSIUM SERPL-SCNC: 4.2 MMOL/L (ref 3.7–5.3)
RBC # BLD: 4.9 M/UL (ref 4.5–5.9)
RBC # BLD: ABNORMAL 10*6/UL
RETIC %: 2.3 % (ref 0.5–2)
RETIC HEMOGLOBIN: ABNORMAL PG (ref 28.2–35.7)
SEG NEUTROPHILS: 75 % (ref 36–66)
SEGMENTED NEUTROPHILS ABSOLUTE COUNT: 11.3 K/UL (ref 1.8–7.7)
SODIUM BLD-SCNC: 139 MMOL/L (ref 135–144)
SPECIMEN DESCRIPTION: NORMAL
TOTAL IRON BINDING CAPACITY: 220 UG/DL (ref 250–450)
UNSATURATED IRON BINDING CAPACITY: 193 UG/DL (ref 112–347)
VITAMIN B-12: 663 PG/ML (ref 232–1245)
WBC # BLD: 14.8 K/UL (ref 3.5–11)
WBC # BLD: ABNORMAL 10*3/UL

## 2018-12-17 PROCEDURE — 6370000000 HC RX 637 (ALT 250 FOR IP): Performed by: FAMILY MEDICINE

## 2018-12-17 PROCEDURE — 94640 AIRWAY INHALATION TREATMENT: CPT

## 2018-12-17 PROCEDURE — 6360000002 HC RX W HCPCS: Performed by: SURGERY

## 2018-12-17 PROCEDURE — 6360000002 HC RX W HCPCS: Performed by: INTERNAL MEDICINE

## 2018-12-17 PROCEDURE — 99232 SBSQ HOSP IP/OBS MODERATE 35: CPT | Performed by: FAMILY MEDICINE

## 2018-12-17 PROCEDURE — 83010 ASSAY OF HAPTOGLOBIN QUANT: CPT

## 2018-12-17 PROCEDURE — 85045 AUTOMATED RETICULOCYTE COUNT: CPT

## 2018-12-17 PROCEDURE — 2060000000 HC ICU INTERMEDIATE R&B

## 2018-12-17 PROCEDURE — 6370000000 HC RX 637 (ALT 250 FOR IP): Performed by: INTERNAL MEDICINE

## 2018-12-17 PROCEDURE — 2580000003 HC RX 258: Performed by: SURGERY

## 2018-12-17 PROCEDURE — G8988 SELF CARE GOAL STATUS: HCPCS

## 2018-12-17 PROCEDURE — 97535 SELF CARE MNGMENT TRAINING: CPT

## 2018-12-17 PROCEDURE — 94761 N-INVAS EAR/PLS OXIMETRY MLT: CPT

## 2018-12-17 PROCEDURE — 85025 COMPLETE CBC W/AUTO DIFF WBC: CPT

## 2018-12-17 PROCEDURE — 6360000002 HC RX W HCPCS: Performed by: NURSE PRACTITIONER

## 2018-12-17 PROCEDURE — 83540 ASSAY OF IRON: CPT

## 2018-12-17 PROCEDURE — 36415 COLL VENOUS BLD VENIPUNCTURE: CPT

## 2018-12-17 PROCEDURE — 80048 BASIC METABOLIC PNL TOTAL CA: CPT

## 2018-12-17 PROCEDURE — 97530 THERAPEUTIC ACTIVITIES: CPT

## 2018-12-17 PROCEDURE — 2700000000 HC OXYGEN THERAPY PER DAY

## 2018-12-17 PROCEDURE — 99232 SBSQ HOSP IP/OBS MODERATE 35: CPT | Performed by: INTERNAL MEDICINE

## 2018-12-17 PROCEDURE — 2500000003 HC RX 250 WO HCPCS: Performed by: INTERNAL MEDICINE

## 2018-12-17 PROCEDURE — 82607 VITAMIN B-12: CPT

## 2018-12-17 PROCEDURE — 88184 FLOWCYTOMETRY/ TC 1 MARKER: CPT

## 2018-12-17 PROCEDURE — 87641 MR-STAPH DNA AMP PROBE: CPT

## 2018-12-17 PROCEDURE — 6370000000 HC RX 637 (ALT 250 FOR IP): Performed by: SURGERY

## 2018-12-17 PROCEDURE — 97110 THERAPEUTIC EXERCISES: CPT

## 2018-12-17 PROCEDURE — 71045 X-RAY EXAM CHEST 1 VIEW: CPT

## 2018-12-17 PROCEDURE — 81206 BCR/ABL1 GENE MAJOR BP: CPT

## 2018-12-17 PROCEDURE — 97116 GAIT TRAINING THERAPY: CPT

## 2018-12-17 PROCEDURE — 83615 LACTATE (LD) (LDH) ENZYME: CPT

## 2018-12-17 PROCEDURE — 51702 INSERT TEMP BLADDER CATH: CPT

## 2018-12-17 PROCEDURE — 83550 IRON BINDING TEST: CPT

## 2018-12-17 PROCEDURE — G8979 MOBILITY GOAL STATUS: HCPCS

## 2018-12-17 PROCEDURE — 88185 FLOWCYTOMETRY/TC ADD-ON: CPT

## 2018-12-17 PROCEDURE — G8987 SELF CARE CURRENT STATUS: HCPCS

## 2018-12-17 PROCEDURE — 82746 ASSAY OF FOLIC ACID SERUM: CPT

## 2018-12-17 PROCEDURE — 81270 JAK2 GENE: CPT

## 2018-12-17 PROCEDURE — G8978 MOBILITY CURRENT STATUS: HCPCS

## 2018-12-17 RX ORDER — POLYETHYLENE GLYCOL 3350 17 G/17G
17 POWDER, FOR SOLUTION ORAL 2 TIMES DAILY
Status: DISCONTINUED | OUTPATIENT
Start: 2018-12-17 | End: 2018-12-19 | Stop reason: HOSPADM

## 2018-12-17 RX ORDER — METHYLPREDNISOLONE SODIUM SUCCINATE 40 MG/ML
40 INJECTION, POWDER, LYOPHILIZED, FOR SOLUTION INTRAMUSCULAR; INTRAVENOUS EVERY 6 HOURS
Status: COMPLETED | OUTPATIENT
Start: 2018-12-17 | End: 2018-12-17

## 2018-12-17 RX ORDER — CLOPIDOGREL BISULFATE 75 MG/1
75 TABLET ORAL DAILY
Status: DISCONTINUED | OUTPATIENT
Start: 2018-12-17 | End: 2018-12-19 | Stop reason: HOSPADM

## 2018-12-17 RX ORDER — LANOLIN ALCOHOL/MO/W.PET/CERES
325 CREAM (GRAM) TOPICAL
Status: DISCONTINUED | OUTPATIENT
Start: 2018-12-18 | End: 2018-12-19 | Stop reason: HOSPADM

## 2018-12-17 RX ADMIN — IPRATROPIUM BROMIDE 0.5 MG: 0.5 SOLUTION RESPIRATORY (INHALATION) at 00:10

## 2018-12-17 RX ADMIN — HYDROCODONE BITARTRATE AND ACETAMINOPHEN 2 TABLET: 5; 325 TABLET ORAL at 08:50

## 2018-12-17 RX ADMIN — METHYLPREDNISOLONE SODIUM SUCCINATE 40 MG: 40 INJECTION, POWDER, FOR SOLUTION INTRAMUSCULAR; INTRAVENOUS at 18:24

## 2018-12-17 RX ADMIN — LORAZEPAM 0.5 MG: 2 INJECTION INTRAMUSCULAR; INTRAVENOUS at 22:52

## 2018-12-17 RX ADMIN — HYDROCODONE BITARTRATE AND ACETAMINOPHEN 2 TABLET: 5; 325 TABLET ORAL at 19:30

## 2018-12-17 RX ADMIN — ATORVASTATIN CALCIUM 40 MG: 40 TABLET, FILM COATED ORAL at 19:29

## 2018-12-17 RX ADMIN — METHYLPREDNISOLONE SODIUM SUCCINATE 40 MG: 40 INJECTION, POWDER, FOR SOLUTION INTRAMUSCULAR; INTRAVENOUS at 22:51

## 2018-12-17 RX ADMIN — IPRATROPIUM BROMIDE 0.5 MG: 0.5 SOLUTION RESPIRATORY (INHALATION) at 04:15

## 2018-12-17 RX ADMIN — FAMOTIDINE 20 MG: 20 TABLET ORAL at 08:44

## 2018-12-17 RX ADMIN — TAZOBACTAM SODIUM AND PIPERACILLIN SODIUM 3.38 G: 375; 3 INJECTION, SOLUTION INTRAVENOUS at 00:39

## 2018-12-17 RX ADMIN — HYDROCODONE BITARTRATE AND ACETAMINOPHEN 2 TABLET: 5; 325 TABLET ORAL at 22:52

## 2018-12-17 RX ADMIN — Medication 10 ML: at 08:51

## 2018-12-17 RX ADMIN — METHYLPREDNISOLONE SODIUM SUCCINATE 40 MG: 40 INJECTION, POWDER, FOR SOLUTION INTRAMUSCULAR; INTRAVENOUS at 13:35

## 2018-12-17 RX ADMIN — AMLODIPINE BESYLATE 5 MG: 5 TABLET ORAL at 08:44

## 2018-12-17 RX ADMIN — ENOXAPARIN SODIUM 40 MG: 40 INJECTION SUBCUTANEOUS at 08:44

## 2018-12-17 RX ADMIN — LEVALBUTEROL 1.25 MG: 1.25 SOLUTION, CONCENTRATE RESPIRATORY (INHALATION) at 18:30

## 2018-12-17 RX ADMIN — IPRATROPIUM BROMIDE 0.5 MG: 0.5 SOLUTION RESPIRATORY (INHALATION) at 18:30

## 2018-12-17 RX ADMIN — POLYETHYLENE GLYCOL 3350 17 G: 17 POWDER, FOR SOLUTION ORAL at 11:33

## 2018-12-17 RX ADMIN — METOPROLOL TARTRATE 25 MG: 25 TABLET ORAL at 19:29

## 2018-12-17 RX ADMIN — IPRATROPIUM BROMIDE 0.5 MG: 0.5 SOLUTION RESPIRATORY (INHALATION) at 08:01

## 2018-12-17 RX ADMIN — LEVALBUTEROL 1.25 MG: 1.25 SOLUTION, CONCENTRATE RESPIRATORY (INHALATION) at 11:27

## 2018-12-17 RX ADMIN — Medication 10 ML: at 19:31

## 2018-12-17 RX ADMIN — TAZOBACTAM SODIUM AND PIPERACILLIN SODIUM 3.38 G: 375; 3 INJECTION, SOLUTION INTRAVENOUS at 08:50

## 2018-12-17 RX ADMIN — FAMOTIDINE 20 MG: 20 TABLET ORAL at 19:29

## 2018-12-17 RX ADMIN — HYDROCODONE BITARTRATE AND ACETAMINOPHEN 2 TABLET: 5; 325 TABLET ORAL at 03:43

## 2018-12-17 RX ADMIN — LEVALBUTEROL 1.25 MG: 1.25 SOLUTION, CONCENTRATE RESPIRATORY (INHALATION) at 04:15

## 2018-12-17 RX ADMIN — Medication 2 PUFF: at 18:30

## 2018-12-17 RX ADMIN — CLOPIDOGREL 75 MG: 75 TABLET, FILM COATED ORAL at 18:23

## 2018-12-17 RX ADMIN — LEVALBUTEROL 1.25 MG: 1.25 SOLUTION, CONCENTRATE RESPIRATORY (INHALATION) at 08:02

## 2018-12-17 RX ADMIN — IPRATROPIUM BROMIDE 0.5 MG: 0.5 SOLUTION RESPIRATORY (INHALATION) at 11:26

## 2018-12-17 RX ADMIN — HYDROMORPHONE HYDROCHLORIDE 2 MG: 1 INJECTION, SOLUTION INTRAMUSCULAR; INTRAVENOUS; SUBCUTANEOUS at 09:37

## 2018-12-17 RX ADMIN — DOCUSATE SODIUM 100 MG: 100 CAPSULE, LIQUID FILLED ORAL at 19:29

## 2018-12-17 RX ADMIN — ASPIRIN 81 MG: 81 TABLET, COATED ORAL at 08:50

## 2018-12-17 RX ADMIN — LEVALBUTEROL 1.25 MG: 1.25 SOLUTION, CONCENTRATE RESPIRATORY (INHALATION) at 00:10

## 2018-12-17 RX ADMIN — HYDROCODONE BITARTRATE AND ACETAMINOPHEN 2 TABLET: 5; 325 TABLET ORAL at 15:26

## 2018-12-17 RX ADMIN — Medication 2 PUFF: at 08:03

## 2018-12-17 RX ADMIN — DOCUSATE SODIUM 100 MG: 100 CAPSULE, LIQUID FILLED ORAL at 08:44

## 2018-12-17 RX ADMIN — METOPROLOL TARTRATE 25 MG: 25 TABLET ORAL at 08:43

## 2018-12-17 ASSESSMENT — PAIN DESCRIPTION - DESCRIPTORS
DESCRIPTORS: ACHING;DISCOMFORT;PRESSURE
DESCRIPTORS: ACHING;DISCOMFORT
DESCRIPTORS: ACHING;DISCOMFORT
DESCRIPTORS: DULL
DESCRIPTORS: DULL;ACHING

## 2018-12-17 ASSESSMENT — ENCOUNTER SYMPTOMS
CONSTIPATION: 1
NAUSEA: 0
ABDOMINAL PAIN: 0
SINUS PRESSURE: 0
BLOOD IN STOOL: 0
VOMITING: 0
VOICE CHANGE: 0
SHORTNESS OF BREATH: 0
SORE THROAT: 0
WHEEZING: 0
COUGH: 0
DIARRHEA: 0

## 2018-12-17 ASSESSMENT — PAIN DESCRIPTION - PAIN TYPE
TYPE: SURGICAL PAIN
TYPE: SURGICAL PAIN;ACUTE PAIN
TYPE: SURGICAL PAIN

## 2018-12-17 ASSESSMENT — PAIN DESCRIPTION - FREQUENCY
FREQUENCY: CONTINUOUS

## 2018-12-17 ASSESSMENT — PAIN SCALES - GENERAL
PAINLEVEL_OUTOF10: 6
PAINLEVEL_OUTOF10: 6
PAINLEVEL_OUTOF10: 7
PAINLEVEL_OUTOF10: 5
PAINLEVEL_OUTOF10: 8
PAINLEVEL_OUTOF10: 5
PAINLEVEL_OUTOF10: 4
PAINLEVEL_OUTOF10: 7
PAINLEVEL_OUTOF10: 8
PAINLEVEL_OUTOF10: 7
PAINLEVEL_OUTOF10: 3
PAINLEVEL_OUTOF10: 7
PAINLEVEL_OUTOF10: 8

## 2018-12-17 ASSESSMENT — PAIN DESCRIPTION - LOCATION
LOCATION: ABDOMEN;GROIN
LOCATION: ABDOMEN
LOCATION: ABDOMEN;GROIN
LOCATION: GROIN

## 2018-12-17 ASSESSMENT — PAIN DESCRIPTION - PROGRESSION
CLINICAL_PROGRESSION: GRADUALLY IMPROVING
CLINICAL_PROGRESSION: GRADUALLY IMPROVING
CLINICAL_PROGRESSION: NOT CHANGED
CLINICAL_PROGRESSION: GRADUALLY IMPROVING
CLINICAL_PROGRESSION: NOT CHANGED
CLINICAL_PROGRESSION: GRADUALLY IMPROVING
CLINICAL_PROGRESSION: GRADUALLY IMPROVING
CLINICAL_PROGRESSION: NOT CHANGED
CLINICAL_PROGRESSION: GRADUALLY WORSENING
CLINICAL_PROGRESSION: NOT CHANGED
CLINICAL_PROGRESSION: GRADUALLY IMPROVING
CLINICAL_PROGRESSION: NOT CHANGED
CLINICAL_PROGRESSION: GRADUALLY WORSENING
CLINICAL_PROGRESSION: NOT CHANGED
CLINICAL_PROGRESSION: GRADUALLY IMPROVING

## 2018-12-17 ASSESSMENT — PAIN DESCRIPTION - ORIENTATION
ORIENTATION: MID

## 2018-12-17 ASSESSMENT — PAIN DESCRIPTION - ONSET
ONSET: ON-GOING

## 2018-12-17 NOTE — PROGRESS NOTES
Physical Therapy  Facility/Department: [facility] CVICU  Daily Treatment Note  NAME: Chidi Baca  : 1970  MRN: [id number]    Date of Service: 2018    Discharge Recommendations:  Home with assist PRN, Home with Home health PT   PT Equipment Recommendations  Equipment Needed: No    Patient Diagnosis(es): aorto-femoral bypass     has a past medical history of Bandemia; CAD (coronary artery disease); Cerebral artery occlusion with cerebral infarction [facility]); COPD (chronic obstructive pulmonary disease) ([facility] Fracture of metatarsal bone; Gangrene ([facility] [facility] Hypertension; Kidney disease, chronic, stage III (GFR 30-59 ml/min) ([facility] Moderate malnutrition ([facility] [facility] MRSA (methicillin resistant staph aureus) culture positive; Pain, lower extremity; Peripheral vascular disease ([facility] [facility] and Thrombocytosis ([facility] [facility] has a past surgical history that includes Coronary angioplasty with stent; other surgical history (10/26/2018); other surgical history; Tonsillectomy; Toe amputation (Right); Aorto-femoral Bypass Graft (2018); and aortofemoral-popliteal bypass graft (N/A, 2018). Restrictions  Restrictions/Precautions  Restrictions/Precautions: Contact Precautions, General Precautions  Position Activity Restriction  Other position/activity restrictions: IV, 3L O2 per nc, telemetry, beasley  Subjective   General  Chart Reviewed: Yes  Family / Caregiver Present: No  Subjective  Subjective: \"Coughing has made me sore\"  Pain Screening  Patient Currently in Pain: Yes  Pain Assessment  Pain Level: 8  Pain Type: Surgical pain  Pain Location: Groin  Pain Intervention(s): Repositioned; Ambulation/Increased activity  Vital Signs  Patient Currently in Pain: Yes       Orientation  Orientation  Overall Orientation Status: Within Normal Limits  Cognition   Cognition  Overall Cognitive Status: WNL  Objective   Bed mobility  Bridging: Minimal assistance  Rolling to Left: Minimal assistance  Rolling to Right: Minimal assistance  Supine to

## 2018-12-17 NOTE — PROGRESS NOTES
capsule Inhale 1 capsule into the lungs daily 9/14/18   Kaleb Hyde MD   Gauze Pads & Dressings (KERLIX GAUZE ROLL LARGE) MISC 30 kerlix rolls for daily dressing changes 9/5/18   Maylin Garcia DPM   Gauze Pads & Dressings (GAUZE DRESSING) 4\"X4\" PADS Please dispense 100 gauze pads for patient's daily dressing changes 9/5/18   Maylin Garcia DPM   Gauze Pads & Dressings [facility] GAUZE ROLL SMALL) MISC Apply to foot 8/24/18   Brian Shaw MD   aspirin 81 MG tablet Take 81 mg by mouth daily    Historical Provider, MD   varenicline (CHANTIX CONTINUING MONTH DHRUV) 1 MG tablet Take 1 tablet by mouth 2 times daily 5/18/18   Giles Abarca MD   traMADol (ULTRAM) 50 MG tablet Take 1 tablet by mouth every 6 hours as needed for Pain for up to 7 days. . 5/2/18 5/9/18  Sanju Buck DPM     Current Facility-Administered Medications   Medication Dose Route Frequency Provider Last Rate Last Dose    mometasone-formoterol (DULERA) 100-5 MCG/ACT inhaler 2 puff  2 puff Inhalation BID Lex Jeff MD   2 puff at 12/17/18 0803    LORazepam (ATIVAN) injection 0.5 mg  0.5 mg Intravenous Q2H PRN Dewayne Erichsen, APRDAWIT - CNP   0.5 mg at 12/15/18 0025    ipratropium (ATROVENT) 0.02 % nebulizer solution 0.5 mg  0.5 mg Nebulization Q4H Lex Jeff MD   0.5 mg at 12/17/18 0801    levalbuterol (Tod Ouch) nebulizer solution 1.25 mg  1.25 mg Nebulization Q4H Lex Jeff MD   1.25 mg at 12/17/18 0802    sodium chloride nebulizer 0.9 % solution 3 mL  3 mL Nebulization Q8H PRN Lex Jeff MD        dexmedetomidine (PRECEDEX) 400 mcg in sodium chloride 0.9 % 100 mL infusion  0.2 mcg/kg/hr Intravenous Continuous Lex Jeff MD   Stopped at 12/15/18 1503    piperacillin-tazobactam (ZOSYN) 3.375 g in dextrose 50 mL IVPB extended infusion (premix)  3.375 g Intravenous Q8H Kaia Hutchinson MD 12.5 mL/hr at 12/17/18 0850 3.375 g at 12/17/18 0850    labetalol (NORMODYNE;TRANDATE) injection 5 mg  5 mg Intravenous Q2H PRN SUSANA Molina CNP   5 mg at 12/14/18 2118    aspirin EC tablet 81 mg  81 mg Oral Daily Tiesha White MD   81 mg at 12/17/18 0850    sodium chloride (OCEAN, BABY AYR) 0.65 % nasal spray 1 spray  1 spray Nasal PRN Tiesha White MD        lidocaine (LMX) 4 % cream   Topical PRN Tiesha White MD        atorvastatin (LIPITOR) tablet 40 mg  40 mg Oral Nightly Tiesha White MD   40 mg at 12/16/18 2124    amLODIPine (NORVASC) tablet 5 mg  5 mg Oral Daily Tiesha White MD   5 mg at 12/17/18 0844    metoprolol tartrate (LOPRESSOR) tablet 25 mg  25 mg Oral BID Tiesha White MD   25 mg at 12/17/18 0843    albuterol sulfate  (90 Base) MCG/ACT inhaler 2 puff  2 puff Inhalation Q6H PRN Tiesha White MD        sodium chloride flush 0.9 % injection 10 mL  10 mL Intravenous 2 times per day Tiesha White MD   10 mL at 12/17/18 0851    sodium chloride flush 0.9 % injection 10 mL  10 mL Intravenous PRN Tiesha White MD        acetaminophen (TYLENOL) tablet 650 mg  650 mg Oral Q4H PRN Tiesha White MD        enoxaparin (LOVENOX) injection 40 mg  40 mg Subcutaneous Daily Tiesha White MD   40 mg at 12/17/18 0844    HYDROcodone-acetaminophen (NORCO) 5-325 MG per tablet 1 tablet  1 tablet Oral Q4H PRN Tiesha White MD        Or    HYDROcodone-acetaminophen [facility]) 5-325 MG per tablet 2 tablet  2 tablet Oral Q4H PRN Tiesha White MD   2 tablet at 12/17/18 0850    docusate sodium (COLACE) capsule 100 mg  100 mg Oral BID Tiesha White MD   100 mg at 12/17/18 0844    famotidine (PEPCID) tablet 20 mg  20 mg Oral BID Tiesha White MD   20 mg at 12/17/18 0844    hydrALAZINE (APRESOLINE) injection 10 mg  10 mg Intravenous Q1H PRN Tiesha White MD        ondansetron (ZOFRAN-ODT) disintegrating tablet 4 mg  4 mg Oral Q6H PRN Tiesha White MD        Or    ondansetron [facility]) injection 4 mg  4 mg Intravenous BP (!) 148/84   Pulse 95   Temp 97.7 °F (36.5 °C) (Temporal)   Resp 20   Ht 6' 2\" (1.88 m)   Wt 136 lb 6 oz (61.9 kg)   SpO2 95%   BMI 17.51 kg/m²    Temp (24hrs), Av.1 °F (36.7 °C), Min:97.7 °F (36.5 °C), Max:98.4 °F (36.9 °C)      General appearance - well appearing, not in pain or distress  Mental status - good mood, alert and oriented  Eyes - pupils equal and reactive, extraocular eye movements intact  Ears - bilateral TM's and external ear canals normal  Nose - normal and patent, no erythema, discharge or polyps  Mouth - mucous membranes moist, pharynx normal without lesions  Neck - supple, no significant adenopathy  Lymphatics - no palpable lymphadenopathy, no hepatosplenomegaly  Chest - clear to auscultation, no wheezes, rales or rhonchi, symmetric air entry  Heart - normal rate, regular rhythm, normal S1, S2, no murmurs, rubs, clicks or gallops  Abdomen - soft, nontender, nondistended, no masses or organomegaly  Neurological - alert, oriented, normal speech, no focal findings or movement disorder noted  Musculoskeletal - no joint tenderness, deformity or swelling. Status post recent vascular surgery as above  Extremities - peripheral pulses normal, no pedal edema, no clubbing or cyanosis  Skin - normal coloration and turgor, no rashes, no suspicious skin lesions noted           DATA:      Labs:       CBC:   Recent Labs      18   0916  18   0559   WBC  15.0*  14.8*   HGB  12.9*  13.7   HCT  37.5*  40.3*   PLT  807*  861*     BMP:   Recent Labs      18   0916  18   0559   NA  136  139   K  4.2  4.2   CO2  23  25   BUN  15  15   CREATININE  1.09  1.07   LABGLOM  >60  >60   GLUCOSE  97  91     PT/INR: No results for input(s): PROTIME, INR in the last 72 hours. APTT:No results for input(s): APTT in the last 72 hours. LIVER PROFILE:No results for input(s): AST, ALT, LABALBU in the last 72 hours.             IMPRESSION:    Primary Problem  Peripheral vascular disease

## 2018-12-17 NOTE — PROGRESS NOTES
[facility]) 2013    COPD (chronic obstructive pulmonary disease) ([facility])     Fracture of metatarsal bone     closed non displaced left foot    Gangrene ([facility])     left toe. Pt denies gangrene.  Hypertension     Kidney disease, chronic, stage III (GFR 30-59 ml/min) ([facility])     Moderate malnutrition ([facility])     MRSA (methicillin resistant staph aureus) culture positive 05/02/2018    toe    Pain, lower extremity     Peripheral vascular disease ([facility])     Thrombocytosis ([facility])       Allergies: No Known Allergies   Medications :    mometasone-formoterol 2 puff Inhalation BID   ipratropium 0.5 mg Nebulization Q4H   levalbuterol 1.25 mg Nebulization Q4H   piperacillin-tazobactam 3.375 g Intravenous Q8H   aspirin 81 mg Oral Daily   atorvastatin 40 mg Oral Nightly   amLODIPine 5 mg Oral Daily   metoprolol tartrate 25 mg Oral BID   sodium chloride flush 10 mL Intravenous 2 times per day   enoxaparin 40 mg Subcutaneous Daily   docusate sodium 100 mg Oral BID   famotidine 20 mg Oral BID       Review of Systems   Review of Systems   Constitutional: Negative for activity change, appetite change, chills, fatigue, fever and unexpected weight change. HENT: Negative for congestion, mouth sores, postnasal drip, sinus pressure, sore throat and voice change. Eyes: Negative for visual disturbance. Respiratory: Negative for cough, shortness of breath and wheezing. Cardiovascular: Negative for chest pain and palpitations. Gastrointestinal: Positive for constipation. Negative for abdominal pain, blood in stool, diarrhea, nausea and vomiting. Endocrine: Negative for polyuria. Genitourinary: Negative for difficulty urinating, dysuria, frequency and urgency. Musculoskeletal: Negative for arthralgias, joint swelling and myalgias. Neurological: Negative for dizziness, tremors, speech difficulty, light-headedness and headaches.      Objective :      Current Vitals : Temp: 97.7 °F (36.5 °C),  Pulse: 95, Resp: 20, BP: (!) 148/84, with severe arterial insufficiency and    suggestive of aortoiliac occlusive disease. Left OLESYA of 1.04 is within    normal limits. Exam is essentially unchanged from his previous study in   [facility] 2018. Clinical Course : stable  Assessment and Plan  :        1. Peripheral vascular disease with claudication - s/p aortobifemoral bypass 12/13/18. 2. Chronic kidney disease stage III - stable   3. Postoperative acute respiratory failure with hypoxia -  Had post op agitation and resp failure requiring sedation with precedex - stopped . wean as tolerated. 4. Persistent leukocytosis-on empiric Zosyn. - no signs of infection . ? Myeloproliferative disorder - hematology on board , may need Bone marrow biopsy. Follow OP . Monitor off antibiotics   5. COPD - wean off oxygen as tolerated. 6. CAD  7. Moderate malnutrition  8. Essential Thrombocytosis    Remove beasley cathter   Ambulate ad [place]   Stool softeners   Limit narcotics   Discharge Planning     Continue to monitor vitals , Intake / output ,  Cell count , HGB , Kidney function, oxygenation  as indicated . Plan and updates discussed with patient ,  answers  explained to satisfaction.    Plan discussed with Staff Adolfo Strickland RN     (Please note that portions of this note were completed with a voice recognition program. Efforts were made to edit the dictations but occasionally words are mis-transcribed.)      Yeyo Mendez MD  12/17/2018

## 2018-12-17 NOTE — PROGRESS NOTES
and awareness/assist with IV pole/lines                        Cognition  Overall Cognitive Status: Exceptions  Arousal/Alertness: Delayed responses to stimuli  Following Commands: Follows one step commands with increased time  Attention Span: Attends with cues to redirect  Memory: Decreased short term memory  Safety Judgement: Decreased awareness of need for safety  Problem Solving: Decreased awareness of errors;Assistance required to identify errors made;Assistance required to correct errors made  Insights: Decreased awareness of deficits  Initiation: Requires cues for some  Sequencing: Requires cues for some                                         Assessment   Performance deficits / Impairments: Decreased functional mobility ; Decreased ADL status; Decreased balance;Decreased endurance;Decreased strength;Decreased high-level IADLs;Decreased sensation  Assessment: Skilled OT indicated to increase I and safety awareness with ADL and functional performance to return home at [facility]. Prognosis: Good  Patient Education: OT POC, pursed lip breathing, EC/WS tech, DC recommendations, postural control, safety in function, call light use/fall prevention   REQUIRES OT FOLLOW UP: Yes  Activity Tolerance  Activity Tolerance: Patient limited by fatigue;Patient Tolerated treatment well;Patient limited by pain  Activity Tolerance: fair   Safety Devices  Type of devices: Chair alarm in place; Left in chair;Patient at risk for falls;Call light within reach;Gait belt;Nurse notified          Plan   Plan  Times per week: 4-6x/week  Times per day: Daily  Current Treatment Recommendations: Balance Training, Functional Mobility Training, Endurance Training, Safety Education & Training, Equipment Evaluation, Education, & procurement, Self-Care / ADL, Patient/Caregiver Education & Training, Home Management Training  G-Code  OT G-codes  Functional Assessment Tool Used: AM-PAC  Score: 19  Functional Limitation: Self care  Self Care Current

## 2018-12-17 NOTE — PLAN OF CARE
Problem: Falls - Risk of:  Goal: Will remain free from falls  Will remain free from falls   Outcome: Met This Shift  Patient is a fall risk during this admission. Fall risk assessment was performed. Patient is absent of falls. Bed is in the lowest position. Wheels on the bed are locked. Call light and bed side table are within reach. Clutter is removed. Patient was educated to call out when needing assistance or wanting to get out of bed. Patient offered toileting assistance during rounding. Hourly rounds have been performed. Problem: Risk for Impaired Skin Integrity  Goal: Tissue integrity - skin and mucous membranes  Structural intactness and normal physiological function of skin and  mucous membranes. Outcome: Met This Shift  See LDA for incisions . Sites remain clean and dry. Skin assessment complete. Pt able to turn per self. Area kept free from moisture. Proper nourishment and fluids encouraged, as appropriate. Will continue to monitor for additional needs and changes in skin breakdown.

## 2018-12-18 LAB
ABO/RH: NORMAL
ABSOLUTE EOS #: 0 K/UL (ref 0–0.4)
ABSOLUTE IMMATURE GRANULOCYTE: ABNORMAL K/UL (ref 0–0.3)
ABSOLUTE LYMPH #: 0.8 K/UL (ref 1–4.8)
ABSOLUTE MONO #: 1 K/UL (ref 0.2–0.8)
ANION GAP SERPL CALCULATED.3IONS-SCNC: 18 MMOL/L (ref 9–17)
ANTIBODY SCREEN: NEGATIVE
ARM BAND NUMBER: NORMAL
BASOPHILS # BLD: 0 % (ref 0–2)
BASOPHILS ABSOLUTE: 0.1 K/UL (ref 0–0.2)
BLD PROD TYP BPU: NORMAL
BUN BLDV-MCNC: 19 MG/DL (ref 6–20)
BUN/CREAT BLD: 15 (ref 9–20)
CALCIUM SERPL-MCNC: 9.6 MG/DL (ref 8.6–10.4)
CHLORIDE BLD-SCNC: 96 MMOL/L (ref 98–107)
CO2: 23 MMOL/L (ref 20–31)
CREAT SERPL-MCNC: 1.23 MG/DL (ref 0.7–1.2)
CROSSMATCH RESULT: NORMAL
DIFFERENTIAL TYPE: ABNORMAL
DISPENSE STATUS BLOOD BANK: NORMAL
EOSINOPHILS RELATIVE PERCENT: 0 % (ref 1–4)
EXPIRATION DATE: NORMAL
GFR AFRICAN AMERICAN: >60 ML/MIN
GFR NON-AFRICAN AMERICAN: >60 ML/MIN
GFR SERPL CREATININE-BSD FRML MDRD: ABNORMAL ML/MIN/{1.73_M2}
GFR SERPL CREATININE-BSD FRML MDRD: ABNORMAL ML/MIN/{1.73_M2}
GLUCOSE BLD-MCNC: 140 MG/DL (ref 70–99)
HCT VFR BLD CALC: 43.1 % (ref 41–53)
HEMOGLOBIN: 14.2 G/DL (ref 13.5–17.5)
IMMATURE GRANULOCYTES: ABNORMAL %
LYMPHOCYTES # BLD: 5 % (ref 24–44)
MCH RBC QN AUTO: 27.1 PG (ref 26–34)
MCHC RBC AUTO-ENTMCNC: 32.9 G/DL (ref 31–37)
MCV RBC AUTO: 82.3 FL (ref 80–100)
MONOCYTES # BLD: 6 % (ref 1–7)
MRSA, DNA, NASAL: NORMAL
NRBC AUTOMATED: ABNORMAL PER 100 WBC
PDW BLD-RTO: 16.1 % (ref 11.5–14.5)
PLATELET # BLD: 1247 K/UL (ref 130–400)
PLATELET ESTIMATE: ABNORMAL
PMV BLD AUTO: 7.4 FL (ref 6–12)
POTASSIUM SERPL-SCNC: 4.4 MMOL/L (ref 3.7–5.3)
RBC # BLD: 5.23 M/UL (ref 4.5–5.9)
RBC # BLD: ABNORMAL 10*6/UL
SEG NEUTROPHILS: 89 % (ref 36–66)
SEGMENTED NEUTROPHILS ABSOLUTE COUNT: 14.8 K/UL (ref 1.8–7.7)
SODIUM BLD-SCNC: 137 MMOL/L (ref 135–144)
SPECIMEN DESCRIPTION: NORMAL
SURGICAL PATHOLOGY REPORT: NORMAL
TRANSFUSION STATUS: NORMAL
UNIT DIVISION: 0
UNIT NUMBER: NORMAL
WBC # BLD: 16.7 K/UL (ref 3.5–11)
WBC # BLD: ABNORMAL 10*3/UL

## 2018-12-18 PROCEDURE — 97530 THERAPEUTIC ACTIVITIES: CPT

## 2018-12-18 PROCEDURE — 80048 BASIC METABOLIC PNL TOTAL CA: CPT

## 2018-12-18 PROCEDURE — 2060000000 HC ICU INTERMEDIATE R&B

## 2018-12-18 PROCEDURE — 6360000002 HC RX W HCPCS: Performed by: INTERNAL MEDICINE

## 2018-12-18 PROCEDURE — 2500000003 HC RX 250 WO HCPCS: Performed by: NURSE PRACTITIONER

## 2018-12-18 PROCEDURE — 97110 THERAPEUTIC EXERCISES: CPT

## 2018-12-18 PROCEDURE — 2580000003 HC RX 258: Performed by: SURGERY

## 2018-12-18 PROCEDURE — 94761 N-INVAS EAR/PLS OXIMETRY MLT: CPT

## 2018-12-18 PROCEDURE — 85025 COMPLETE CBC W/AUTO DIFF WBC: CPT

## 2018-12-18 PROCEDURE — 2580000003 HC RX 258: Performed by: INTERNAL MEDICINE

## 2018-12-18 PROCEDURE — 97116 GAIT TRAINING THERAPY: CPT

## 2018-12-18 PROCEDURE — 94640 AIRWAY INHALATION TREATMENT: CPT

## 2018-12-18 PROCEDURE — 6370000000 HC RX 637 (ALT 250 FOR IP): Performed by: SURGERY

## 2018-12-18 PROCEDURE — 99232 SBSQ HOSP IP/OBS MODERATE 35: CPT | Performed by: INTERNAL MEDICINE

## 2018-12-18 PROCEDURE — 6370000000 HC RX 637 (ALT 250 FOR IP): Performed by: FAMILY MEDICINE

## 2018-12-18 PROCEDURE — 6370000000 HC RX 637 (ALT 250 FOR IP): Performed by: INTERNAL MEDICINE

## 2018-12-18 PROCEDURE — 6360000002 HC RX W HCPCS: Performed by: NURSE PRACTITIONER

## 2018-12-18 PROCEDURE — 97530 THERAPEUTIC ACTIVITIES: CPT | Performed by: NURSE PRACTITIONER

## 2018-12-18 PROCEDURE — 99232 SBSQ HOSP IP/OBS MODERATE 35: CPT | Performed by: FAMILY MEDICINE

## 2018-12-18 PROCEDURE — 36415 COLL VENOUS BLD VENIPUNCTURE: CPT

## 2018-12-18 RX ORDER — HYDROCODONE BITARTRATE AND ACETAMINOPHEN 5; 325 MG/1; MG/1
1 TABLET ORAL EVERY 6 HOURS PRN
Qty: 18 TABLET | Refills: 0 | Status: SHIPPED | OUTPATIENT
Start: 2018-12-18 | End: 2018-12-19 | Stop reason: HOSPADM

## 2018-12-18 RX ORDER — PSEUDOEPHEDRINE HCL 30 MG
100 TABLET ORAL 2 TIMES DAILY
Qty: 30 CAPSULE | Refills: 0 | Status: SHIPPED | OUTPATIENT
Start: 2018-12-18 | End: 2019-01-31

## 2018-12-18 RX ADMIN — IPRATROPIUM BROMIDE 0.5 MG: 0.5 SOLUTION RESPIRATORY (INHALATION) at 07:34

## 2018-12-18 RX ADMIN — IPRATROPIUM BROMIDE 0.5 MG: 0.5 SOLUTION RESPIRATORY (INHALATION) at 11:59

## 2018-12-18 RX ADMIN — ATORVASTATIN CALCIUM 40 MG: 40 TABLET, FILM COATED ORAL at 20:07

## 2018-12-18 RX ADMIN — Medication 10 ML: at 09:35

## 2018-12-18 RX ADMIN — LABETALOL HYDROCHLORIDE 5 MG: 5 INJECTION, SOLUTION INTRAVENOUS at 20:57

## 2018-12-18 RX ADMIN — IRON SUCROSE 200 MG: 20 INJECTION, SOLUTION INTRAVENOUS at 13:46

## 2018-12-18 RX ADMIN — AMLODIPINE BESYLATE 5 MG: 5 TABLET ORAL at 08:47

## 2018-12-18 RX ADMIN — METOPROLOL TARTRATE 25 MG: 25 TABLET ORAL at 20:07

## 2018-12-18 RX ADMIN — LEVALBUTEROL 1.25 MG: 1.25 SOLUTION, CONCENTRATE RESPIRATORY (INHALATION) at 11:59

## 2018-12-18 RX ADMIN — LEVALBUTEROL 1.25 MG: 1.25 SOLUTION, CONCENTRATE RESPIRATORY (INHALATION) at 15:11

## 2018-12-18 RX ADMIN — POLYETHYLENE GLYCOL 3350 17 G: 17 POWDER, FOR SOLUTION ORAL at 08:48

## 2018-12-18 RX ADMIN — Medication 2 PUFF: at 20:32

## 2018-12-18 RX ADMIN — LEVALBUTEROL 1.25 MG: 1.25 SOLUTION, CONCENTRATE RESPIRATORY (INHALATION) at 07:34

## 2018-12-18 RX ADMIN — LEVALBUTEROL 1.25 MG: 1.25 SOLUTION, CONCENTRATE RESPIRATORY (INHALATION) at 23:51

## 2018-12-18 RX ADMIN — IPRATROPIUM BROMIDE 0.5 MG: 0.5 SOLUTION RESPIRATORY (INHALATION) at 20:31

## 2018-12-18 RX ADMIN — FAMOTIDINE 20 MG: 20 TABLET ORAL at 08:47

## 2018-12-18 RX ADMIN — ASPIRIN 81 MG: 81 TABLET, COATED ORAL at 08:47

## 2018-12-18 RX ADMIN — FERROUS SULFATE TAB EC 325 MG (65 MG FE EQUIVALENT) 325 MG: 325 (65 FE) TABLET DELAYED RESPONSE at 08:47

## 2018-12-18 RX ADMIN — DOCUSATE SODIUM 100 MG: 100 CAPSULE, LIQUID FILLED ORAL at 08:47

## 2018-12-18 RX ADMIN — DOCUSATE SODIUM 100 MG: 100 CAPSULE, LIQUID FILLED ORAL at 20:07

## 2018-12-18 RX ADMIN — FAMOTIDINE 20 MG: 20 TABLET ORAL at 20:07

## 2018-12-18 RX ADMIN — HYDROCODONE BITARTRATE AND ACETAMINOPHEN 2 TABLET: 5; 325 TABLET ORAL at 20:25

## 2018-12-18 RX ADMIN — IPRATROPIUM BROMIDE 0.5 MG: 0.5 SOLUTION RESPIRATORY (INHALATION) at 15:11

## 2018-12-18 RX ADMIN — IPRATROPIUM BROMIDE 0.5 MG: 0.5 SOLUTION RESPIRATORY (INHALATION) at 23:50

## 2018-12-18 RX ADMIN — LEVALBUTEROL 1.25 MG: 1.25 SOLUTION, CONCENTRATE RESPIRATORY (INHALATION) at 20:31

## 2018-12-18 RX ADMIN — Medication 10 ML: at 21:00

## 2018-12-18 RX ADMIN — HYDROCODONE BITARTRATE AND ACETAMINOPHEN 1 TABLET: 5; 325 TABLET ORAL at 12:02

## 2018-12-18 RX ADMIN — LORAZEPAM 0.5 MG: 2 INJECTION INTRAMUSCULAR; INTRAVENOUS at 05:57

## 2018-12-18 RX ADMIN — METOPROLOL TARTRATE 25 MG: 25 TABLET ORAL at 08:47

## 2018-12-18 RX ADMIN — CLOPIDOGREL 75 MG: 75 TABLET, FILM COATED ORAL at 08:47

## 2018-12-18 ASSESSMENT — PAIN DESCRIPTION - PROGRESSION
CLINICAL_PROGRESSION: GRADUALLY IMPROVING

## 2018-12-18 ASSESSMENT — PAIN DESCRIPTION - LOCATION
LOCATION: ABDOMEN
LOCATION: ABDOMEN

## 2018-12-18 ASSESSMENT — PAIN DESCRIPTION - FREQUENCY: FREQUENCY: CONTINUOUS

## 2018-12-18 ASSESSMENT — PAIN DESCRIPTION - PAIN TYPE
TYPE: SURGICAL PAIN
TYPE: SURGICAL PAIN

## 2018-12-18 ASSESSMENT — PAIN DESCRIPTION - ONSET: ONSET: ON-GOING

## 2018-12-18 ASSESSMENT — ENCOUNTER SYMPTOMS
NAUSEA: 0
VOICE CHANGE: 0
SHORTNESS OF BREATH: 0
SORE THROAT: 0
DIARRHEA: 0
COUGH: 0
CONSTIPATION: 0
SINUS PRESSURE: 0
ABDOMINAL PAIN: 0
WHEEZING: 0
VOMITING: 0
BLOOD IN STOOL: 0

## 2018-12-18 ASSESSMENT — PAIN SCALES - GENERAL
PAINLEVEL_OUTOF10: 3
PAINLEVEL_OUTOF10: 5
PAINLEVEL_OUTOF10: 6
PAINLEVEL_OUTOF10: 7
PAINLEVEL_OUTOF10: 4
PAINLEVEL_OUTOF10: 0
PAINLEVEL_OUTOF10: 5

## 2018-12-18 ASSESSMENT — PAIN DESCRIPTION - DESCRIPTORS: DESCRIPTORS: ACHING;DISCOMFORT;BURNING

## 2018-12-18 ASSESSMENT — PAIN DESCRIPTION - ORIENTATION
ORIENTATION: MID;LOWER
ORIENTATION: MID;LOWER

## 2018-12-18 NOTE — PLAN OF CARE
Problem: Falls - Risk of:  Goal: Absence of physical injury  Absence of physical injury   Outcome: Ongoing  Fall risk protocol ongoing     Problem: Risk for Impaired Skin Integrity  Goal: Tissue integrity - skin and mucous membranes  Structural intactness and normal physiological function of skin and  mucous membranes.    Outcome: Ongoing  Assess surgical sites Q1, report any changes to provider     Problem: Pain:  Goal: Control of acute pain  Control of acute pain   Outcome: Ongoing  comfort care Q1, pain medication PRN

## 2018-12-18 NOTE — PROGRESS NOTES
bilaterally, normal effort  Heart: regular rate and rhythm, no murmur,  Abdomen: soft, non-tender, non-distended, bowel sounds present all four quadrants, no masses, hepatomegaly, splenomegaly or aortic enlargement, incision clean and dry  Extremities: no edema, erythema or tenderness in the calves  Skin: no gross lesions, rashes, or induration    Assessment:   1. 51 yr old male S/P ABF bypass    Patient Active Problem List:     Essential hypertension     PVD (peripheral vascular disease) (HCC)     Stage 3 chronic kidney disease (HCC)     Closed nondisplaced fracture of third metatarsal bone of left foot     Ischemia of toe     Critical lower limb ischemia     Gangrene of toe of left foot (HCC)     Bandemia     Thrombocytosis (HCC)     Moderate malnutrition (HCC)     Pain of lower extremity     Tobacco abuse     Ischemia of right lower extremity     COPD (chronic obstructive pulmonary disease) (HCC)     Peripheral vascular disease (HCC)     Pain, lower extremity     Kidney disease, chronic, stage III (GFR 30-59 ml/min) (HCC)     Hypertension     CAD (coronary artery disease)     Marijuana user     Normocytic anemia      Plan:   1. DC home today    Electronically signed by Richard Silva MD on 12/18/2018 at 10:05 AM

## 2018-12-18 NOTE — PROGRESS NOTES
[facility]      Daily Progress Note     Admit Date: 12/13/2018  Bed/Room No.  2031/2031-01  Admitting Physician : Nerissa Rock MD  Code Status :[facility] Day:  LOS: 5 days   Chief Complaint:     Worsening leg pain on walking. Principal Problem:    Peripheral vascular disease ([facility] [address]  Active Problems: Thrombocytosis (HCC)    Moderate malnutrition (HCC)    COPD (chronic obstructive pulmonary disease) (HCC)    Pain, lower extremity    Kidney disease, chronic, stage III (GFR 30-59 ml/min) (HCC)    Hypertension    CAD (coronary artery disease)    Marijuana user    Normocytic anemia  Resolved Problems:    * No resolved hospital problems. *    Subjective : Interval History/Significant events :  12/18/18    Patient denies any pain . He remains emotional and tearful after talking to his mom. Eating and drinking . Had BM yesterday. Denies any nausea or vomiting. Was confused this am .   Vitals - Stable afebrile  Labs - persistent leucocytosis and thrombocytosis. Nursing notes , Consults notes reviewed. Overnight events and updates discussed with Nursing staff . Background History:         Curt Sampson is 51 y.o. male  Who was admitted to the hospital on 12/13/2018 for treatment of Peripheral vascular disease ([facility] [address] Patient came to emergency room with worsening leg pain on walking. Patient has known PVD and had right common iliac stent placed on 5/8/18. Patient underwent aortobifemoral bypass on 12/13/18 under general anesthesia and had postoperative respiratory failure likely secondary to underlying COPD. Patient was treated with BiPAP and systemic steroids and bronchodilator treatment. Patient also received Precedex for agitation. He continued to have pain on walking. Patient has underlying history of COPD, CAD, stage III chronic kidney disease, current nicotine use.      PMH:  Past Medical History:   Diagnosis Date    Bandemia     CAD (coronary artery disease)     Cerebral artery occlusion with cerebral infarction [facility]) 2013    COPD [facility]chronic obstructive pulmonary disease) ([facility])     Fracture of metatarsal bone     closed non displaced left foot    Gangrene (HCC)     left toe. Pt denies gangrene.  Hypertension     Kidney disease, chronic, stage III (GFR 30-59 ml/min) ([facility])     Moderate malnutrition ([facility])     MRSA (methicillin resistant staph aureus) culture positive 05/02/2018    toe    Pain, lower extremity     Peripheral vascular disease ([facility])     Thrombocytosis ([facility])       Allergies: No Known Allergies   Medications :    polyethylene glycol 17 g Oral BID   ferrous sulfate 325 mg Oral Daily with breakfast   clopidogrel 75 mg Oral Daily   mometasone-formoterol 2 puff Inhalation BID   ipratropium 0.5 mg Nebulization Q4H   levalbuterol 1.25 mg Nebulization Q4H   aspirin 81 mg Oral Daily   atorvastatin 40 mg Oral Nightly   amLODIPine 5 mg Oral Daily   metoprolol tartrate 25 mg Oral BID   sodium chloride flush 10 mL Intravenous 2 times per day   enoxaparin 40 mg Subcutaneous Daily   docusate sodium 100 mg Oral BID   famotidine 20 mg Oral BID       Review of Systems   Review of Systems   Constitutional: Negative for activity change, appetite change, chills, fatigue, fever and unexpected weight change. HENT: Negative for congestion, mouth sores, postnasal drip, sinus pressure, sore throat and voice change. Eyes: Negative for visual disturbance. Respiratory: Negative for cough, shortness of breath and wheezing. Cardiovascular: Negative for chest pain and palpitations. Gastrointestinal: Negative for abdominal pain, blood in stool, constipation, diarrhea, nausea and vomiting. Endocrine: Negative for polyuria. Genitourinary: Negative for difficulty urinating, dysuria, frequency and urgency. Musculoskeletal: Negative for arthralgias, joint swelling and myalgias.    Neurological: Negative for dizziness, tremors, speech difficulty,

## 2018-12-18 NOTE — PROGRESS NOTES
(12/13/2018); and aortofemoral-popliteal bypass graft (N/A, 12/13/2018). General Comment  Comments: Writer consulted ROZ Duran whom indicated pt. was medically stable for OT this date. Pre Treatment Pain Screening  Intervention List: Patient able to continue with treatment   Orientation  Orientation  Overall Orientation Status: Within Functional Limits  Objective             Balance  Sitting Balance: Supervision  Bed mobility  Supine to Sit: Supervision  Sit to Supine: Supervision      Additional Activities Comment  Additional Activities:   Upon writer exit, call light within reach, pt retired to bed. All lines intact and patient positioned comfortably. All patient needs addressed prior to ending therapy session. Chart reviewed prior to treatment and patient is agreeable for therapy. RN reports patient is medically stable for therapy treatment this date. Assessment   Performance deficits / Impairments: Decreased functional mobility ; Decreased ADL status; Decreased balance;Decreased endurance;Decreased strength;Decreased high-level IADLs;Decreased sensation  Prognosis: Good  Patient Education: Written and verbal edu provided on safe ADL completion techniques and tips during bathing/showering, and toileting; EC/WS techniques of pacing, posturing, body mechanics, planning and organizing tasks, avoiding fatigue, and task simplification in regards to ADLs of eating, grooming, bathing/showering, dressing, and IADLs of cooking, meal cleanup, marketing and meal planning, laundry, bed making, and housework. Written and verbal edu provided regarding fall prevention in the areas of community safety, transportation, proper footwear and clothing, reducing risk of falls, environmental modifications, importance of exercise, consequences of falling, plan if a fall occurs (\"rest and wait\" vs \"up and about\"). Patient verbalize and/or demonstrate good engagement and understanding of edu provided.       REQUIRES OT FOLLOW UP: Yes  Activity Tolerance  Activity Tolerance: Patient Tolerated treatment well  Safety Devices  Safety Devices in place: Yes  Type of devices: Patient at risk for falls;Call light within reach;Gait belt;Nurse notified; All fall risk precautions in place; Bed alarm in place; Left in bed          Plan   Plan  Times per week: 4-6x/week  Times per day: Daily  Current Treatment Recommendations: Balance Training, Functional Mobility Training, Endurance Training, Safety Education & Training, Equipment Evaluation, Education, & procurement, Self-Care / ADL, Patient/Caregiver Education & Training, Home Management Training    AM-PAC Score        AM-[facility] Inpatient Daily Activity Raw Score: 19  AM-PAC Inpatient ADL T-Scale Score : 40.22  ADL Inpatient CMS 0-100% Score: 42.8  ADL Inpatient CMS G-Code Modifier : CK    Goals  Short term goals  Time Frame for Short term goals: 10-12 sessions  Short term goal 1: Pt. will demo Mod-I with functional transfers using least restrictive device, good safety and appropriate DME prn. Short term goal 2: Pt. will demo Good standing balance using least restrictive device with functional reaching tasks during ADL setup/completion. Short term goal 3: Pt. will tolerate standing for 8-10 min for increased INDEP and activity tolerance for functional mobility and ADL tasks. Short term goal 4: Pt. will demo Mod-I with UB/LB ADL routine with least restrictive device, DME as needed and good safety. Short term goal 5: Pt. will engage in 30 min< of ther exer to improve BUE strength/activity tolerance to [facility] for increased INDEP with functional mobility and ADLs. Patient Goals   Patient goals :  \"To go home\"       Therapy Time   Individual Concurrent Group Co-treatment   Time In  2285         Time Out  1320         Minutes  [address] [facility]

## 2018-12-18 NOTE — PROGRESS NOTES
Pulmonary Critical Care Progress Note  Angela Nino MD     Patient seen for the follow up of Acute respiratory failure, and acute exacerbation COPD, history of smoking, atelectasis, peripheral vascular disease, ASCVD    Subjective:  He denies chest pain. Mild occasional cough, mostly dry. Shortness of breath is getting better. He is tolerating orals. Precedex has been weaned off. He is slightly confused. He was given Ativan last night 2 mg ×1    Examination:  Vitals: /68   Pulse 98   Temp 98.1 °F (36.7 °C) (Temporal)   Resp 17   Ht 6' 2\" (1.88 m)   Wt 136 lb 12.8 oz (62.1 kg)   SpO2 95%   BMI 17.56 kg/m²   General appearance: alert and cooperative with exam  Neck: No JVD  Lungs: clear to auscultation bilaterally and diminished breath sounds bilaterally  Heart: regular rate and rhythm, S1, S2 normal, no gallop  Abdomen: Soft, non tender, + BS  Extremities: no cyanosis or clubbing.  No significant edema    LABs:  CBC:   Recent Labs      12/17/18   0559  12/18/18   0609   WBC  14.8*  16.7*   HGB  13.7  14.2   HCT  40.3*  43.1   PLT  861*  1,247*     BMP:   Recent Labs      12/17/18   0559  12/18/18   0609   NA  139  137   K  4.2  4.4   CO2  25  23   BUN  15  19   CREATININE  1.07  1.23*   LABGLOM  >60  >60   GLUCOSE  91  140*     ABG:  Lab Results   Component Value Date    NSB0BYG 24 12/14/2018    FIO2 50.0 12/14/2018       Lab Results   Component Value Date    POCPH 7.37 12/14/2018    POCPCO2 40 12/14/2018    POCPO2 48 12/14/2018    POCHCO3 23.1 12/14/2018    NBEA 2 12/14/2018    PBEA NOT REPORTED 12/14/2018    SDB2MHX 24 12/14/2018    KHON5QKP 83 12/14/2018    FIO2 50.0 12/14/2018     Radiology:  12/17/18      Impression:  · Acute respiratory failure  · Acute exacerbation of COPD  · History of 30-pack-year smoking, quit in 3/18  · Atelectasis  · Peripheral vascular disease/ASCVD    Recommendations:  · Continue IV antibiotics  · Xopenex and Ipratropium Q 4 hours and prn  · Dulera 200  · IV Solu-Medrol

## 2018-12-19 VITALS
OXYGEN SATURATION: 90 % | TEMPERATURE: 98.2 F | HEART RATE: 63 BPM | DIASTOLIC BLOOD PRESSURE: 86 MMHG | RESPIRATION RATE: 18 BRPM | BODY MASS INDEX: 17.56 KG/M2 | HEIGHT: 74 IN | WEIGHT: 136.8 LBS | SYSTOLIC BLOOD PRESSURE: 139 MMHG

## 2018-12-19 LAB — FLOW CYTOMETRY BL: NORMAL

## 2018-12-19 PROCEDURE — 99239 HOSP IP/OBS DSCHRG MGMT >30: CPT | Performed by: FAMILY MEDICINE

## 2018-12-19 PROCEDURE — 6360000002 HC RX W HCPCS: Performed by: SURGERY

## 2018-12-19 PROCEDURE — 2700000000 HC OXYGEN THERAPY PER DAY

## 2018-12-19 PROCEDURE — 6360000002 HC RX W HCPCS: Performed by: INTERNAL MEDICINE

## 2018-12-19 PROCEDURE — 94761 N-INVAS EAR/PLS OXIMETRY MLT: CPT

## 2018-12-19 PROCEDURE — 2580000003 HC RX 258: Performed by: SURGERY

## 2018-12-19 PROCEDURE — 6370000000 HC RX 637 (ALT 250 FOR IP): Performed by: SURGERY

## 2018-12-19 PROCEDURE — 6370000000 HC RX 637 (ALT 250 FOR IP): Performed by: FAMILY MEDICINE

## 2018-12-19 PROCEDURE — 97110 THERAPEUTIC EXERCISES: CPT

## 2018-12-19 PROCEDURE — 94640 AIRWAY INHALATION TREATMENT: CPT

## 2018-12-19 PROCEDURE — 6370000000 HC RX 637 (ALT 250 FOR IP): Performed by: INTERNAL MEDICINE

## 2018-12-19 PROCEDURE — 94618 PULMONARY STRESS TESTING: CPT

## 2018-12-19 PROCEDURE — 99232 SBSQ HOSP IP/OBS MODERATE 35: CPT | Performed by: INTERNAL MEDICINE

## 2018-12-19 PROCEDURE — 97116 GAIT TRAINING THERAPY: CPT

## 2018-12-19 RX ORDER — IPRATROPIUM BROMIDE AND ALBUTEROL SULFATE 2.5; .5 MG/3ML; MG/3ML
1 SOLUTION RESPIRATORY (INHALATION) EVERY 4 HOURS PRN
Qty: 200 ML | Refills: 1 | Status: SHIPPED | OUTPATIENT
Start: 2018-12-19 | End: 2019-09-20 | Stop reason: SDUPTHER

## 2018-12-19 RX ORDER — PREDNISONE 20 MG/1
40 TABLET ORAL DAILY
Qty: 10 TABLET | Refills: 0 | Status: SHIPPED | OUTPATIENT
Start: 2018-12-19 | End: 2018-12-24

## 2018-12-19 RX ORDER — OXYCODONE HYDROCHLORIDE AND ACETAMINOPHEN 5; 325 MG/1; MG/1
1 TABLET ORAL EVERY 4 HOURS PRN
Status: DISCONTINUED | OUTPATIENT
Start: 2018-12-19 | End: 2018-12-19 | Stop reason: HOSPADM

## 2018-12-19 RX ORDER — LANOLIN ALCOHOL/MO/W.PET/CERES
325 CREAM (GRAM) TOPICAL 2 TIMES DAILY
Qty: 180 TABLET | Refills: 1 | Status: SHIPPED | OUTPATIENT
Start: 2018-12-19 | End: 2019-06-26 | Stop reason: SDUPTHER

## 2018-12-19 RX ORDER — OXYCODONE HYDROCHLORIDE AND ACETAMINOPHEN 5; 325 MG/1; MG/1
1 TABLET ORAL EVERY 8 HOURS PRN
Qty: 18 TABLET | Refills: 0 | Status: SHIPPED | OUTPATIENT
Start: 2018-12-19 | End: 2018-12-25

## 2018-12-19 RX ORDER — PREDNISONE 20 MG/1
20 TABLET ORAL 2 TIMES DAILY
Status: DISCONTINUED | OUTPATIENT
Start: 2018-12-19 | End: 2018-12-19 | Stop reason: HOSPADM

## 2018-12-19 RX ORDER — MONTELUKAST SODIUM 10 MG/1
10 TABLET ORAL NIGHTLY
Qty: 30 TABLET | Refills: 3
Start: 2018-12-19 | End: 2019-04-22 | Stop reason: SDUPTHER

## 2018-12-19 RX ORDER — MONTELUKAST SODIUM 10 MG/1
10 TABLET ORAL NIGHTLY
Status: DISCONTINUED | OUTPATIENT
Start: 2018-12-19 | End: 2018-12-19 | Stop reason: HOSPADM

## 2018-12-19 RX ADMIN — OXYCODONE AND ACETAMINOPHEN 1 TABLET: 5; 325 TABLET ORAL at 13:15

## 2018-12-19 RX ADMIN — LEVALBUTEROL 1.25 MG: 1.25 SOLUTION, CONCENTRATE RESPIRATORY (INHALATION) at 15:51

## 2018-12-19 RX ADMIN — ASPIRIN 81 MG: 81 TABLET, COATED ORAL at 08:51

## 2018-12-19 RX ADMIN — Medication 10 ML: at 08:55

## 2018-12-19 RX ADMIN — METOPROLOL TARTRATE 25 MG: 25 TABLET ORAL at 08:51

## 2018-12-19 RX ADMIN — IPRATROPIUM BROMIDE 0.5 MG: 0.5 SOLUTION RESPIRATORY (INHALATION) at 07:24

## 2018-12-19 RX ADMIN — ENOXAPARIN SODIUM 40 MG: 40 INJECTION SUBCUTANEOUS at 08:52

## 2018-12-19 RX ADMIN — FAMOTIDINE 20 MG: 20 TABLET ORAL at 08:51

## 2018-12-19 RX ADMIN — LEVALBUTEROL 1.25 MG: 1.25 SOLUTION, CONCENTRATE RESPIRATORY (INHALATION) at 07:25

## 2018-12-19 RX ADMIN — AMLODIPINE BESYLATE 5 MG: 5 TABLET ORAL at 08:51

## 2018-12-19 RX ADMIN — FERROUS SULFATE TAB EC 325 MG (65 MG FE EQUIVALENT) 325 MG: 325 (65 FE) TABLET DELAYED RESPONSE at 08:51

## 2018-12-19 RX ADMIN — HYDROCODONE BITARTRATE AND ACETAMINOPHEN 2 TABLET: 5; 325 TABLET ORAL at 09:00

## 2018-12-19 RX ADMIN — LEVALBUTEROL 1.25 MG: 1.25 SOLUTION, CONCENTRATE RESPIRATORY (INHALATION) at 11:25

## 2018-12-19 RX ADMIN — CLOPIDOGREL 75 MG: 75 TABLET, FILM COATED ORAL at 08:51

## 2018-12-19 RX ADMIN — IPRATROPIUM BROMIDE 0.5 MG: 0.5 SOLUTION RESPIRATORY (INHALATION) at 11:25

## 2018-12-19 RX ADMIN — DOCUSATE SODIUM 100 MG: 100 CAPSULE, LIQUID FILLED ORAL at 08:52

## 2018-12-19 RX ADMIN — IPRATROPIUM BROMIDE 0.5 MG: 0.5 SOLUTION RESPIRATORY (INHALATION) at 15:51

## 2018-12-19 RX ADMIN — Medication 2 PUFF: at 07:27

## 2018-12-19 ASSESSMENT — PAIN DESCRIPTION - LOCATION
LOCATION: ABDOMEN
LOCATION: ABDOMEN

## 2018-12-19 ASSESSMENT — PAIN DESCRIPTION - PROGRESSION

## 2018-12-19 ASSESSMENT — PAIN DESCRIPTION - PAIN TYPE
TYPE: SURGICAL PAIN
TYPE: SURGICAL PAIN

## 2018-12-19 ASSESSMENT — PAIN SCALES - GENERAL
PAINLEVEL_OUTOF10: 7
PAINLEVEL_OUTOF10: 5
PAINLEVEL_OUTOF10: 6
PAINLEVEL_OUTOF10: 2
PAINLEVEL_OUTOF10: 0

## 2018-12-19 ASSESSMENT — PAIN DESCRIPTION - DESCRIPTORS: DESCRIPTORS: DISCOMFORT;ACHING

## 2018-12-19 ASSESSMENT — ENCOUNTER SYMPTOMS
WHEEZING: 0
VOMITING: 0
SHORTNESS OF BREATH: 0
SINUS PRESSURE: 0
SORE THROAT: 0
ABDOMINAL PAIN: 1
BLOOD IN STOOL: 0
NAUSEA: 0
VOICE CHANGE: 0
CONSTIPATION: 1
DIARRHEA: 0
COUGH: 0

## 2018-12-19 ASSESSMENT — PAIN DESCRIPTION - FREQUENCY: FREQUENCY: CONTINUOUS

## 2018-12-19 ASSESSMENT — PAIN DESCRIPTION - ONSET: ONSET: ON-GOING

## 2018-12-19 ASSESSMENT — PAIN DESCRIPTION - ORIENTATION
ORIENTATION: MID;LOWER
ORIENTATION: MID

## 2018-12-19 NOTE — CARE COORDINATION
Spoke to Elizabeth raya at [address] [place] [address] to inform of discharge. Office will pull VALARIE from [facility]. F/U appointment scheduled with Jeanmarie Pizarro CNP in Dr. Barclay Medico office [phone] at 1pm (1st available appointment) and pt informed. Pt needs home nebulizer and home O2 at 2L for exertion. Spoke to Myesha at [facility] and scripts for nebulizer, O2, face sheet, home O2 eval and face to face note faxed to [phone]. Nebulizer and oxygen portability will be delivered to patients room.

## 2018-12-19 NOTE — FLOWSHEET NOTE
RN reviewed discharge instructions with the patient and patient's spouse at the bedside. Patient verbalizes understanding. Patient provided with nebulizer and home O2 tank.

## 2018-12-19 NOTE — PROGRESS NOTES
for PT per Severiano Din RN  Pain Screening  Patient Currently in Pain: Yes  Pain Assessment  Pain Assessment: 0-10  Pain Level: 5  Pain Type: Surgical pain  Pain Location: Abdomen  Pain Orientation: Mid  Vital Signs  Patient Currently in Pain: Yes       Orientation  Orientation  Overall Orientation Status: Within Normal Limits  Cognition      Objective      Transfers  Sit to Stand: Contact guard assistance  Stand to sit: Contact guard assistance  Stand Pivot Transfers: Contact guard assistance  Ambulation  Ambulation?: Yes  More Ambulation?: Yes  Ambulation 1  Surface: level tile  Device: Rolling Walker  Other Apparatus:  (with out O2)  Assistance: Contact guard assistance  Quality of Gait: slow gait, pt became shakey  Distance: 40 ft x 2, standing rest break when pt became shakey and then returned to the room  Comments: Per Moiz Garcia, assessed pts. SpO2 with and without O2 during ambulation. 92% at rest on 2L, ambulating without O2 85-88% in 40'  Ambulation 2  Surface - 2: level tile  Device 2: [address] [facility] 2: Contact guard assistance  Comments: Per Severiano Din RN, assessed pts. SpO2 with and without O2 during ambulation. 92% at rest on 2L, ambulating without O2 85-88% in 50',  SpO2 to 92% w/ O2 at 2L, at 200' SpO2 89%, at completion 92% w/ 2L with deep breaths     Balance  Posture: Good  Sitting - Static: Good  Sitting - Dynamic: Good  Standing - Static: Good  Standing - Dynamic: Good;-  Comments: with walker while up  Exercises  Hip Abduction: 2x10  Knee Long Arc Quad: 2x10  Ankle Pumps: 2x20                        Assessment   Body structures, Functions, Activity limitations: Decreased functional mobility ; Decreased strength;Decreased endurance;Decreased balance  Prognosis: Good  REQUIRES PT FOLLOW UP: Yes  Activity Tolerance  Activity Tolerance: Patient Tolerated treatment well     G-Code     OutComes Score                                                  AM-PAC Score  AM-PAC Inpatient Mobility Raw Score :

## 2018-12-19 NOTE — PROGRESS NOTES
Pulmonary Critical Care Progress Note  Yris Whiting MD     Patient seen for the follow up of Acute respiratory failure, and acute exacerbation COPD, history of smoking, atelectasis, peripheral vascular disease, ASCVD    Subjective:  He denies chest pain. Mild occasional cough, mostly dry. Shortness of breath is getting better. He is tolerating orals. Precedex has been weaned off. His confusion has resolved. Examination:  Vitals: /86   Pulse 63   Temp 97.3 °F (36.3 °C) (Temporal)   Resp 16   Ht 6' 2\" (1.88 m)   Wt 136 lb 12.8 oz (62.1 kg)   SpO2 94%   BMI 17.56 kg/m²   General appearance: alert and cooperative with exam  Neck: No JVD  Lungs: clear to auscultation bilaterally and diminished breath sounds bilaterally  Heart: regular rate and rhythm, S1, S2 normal, no gallop  Abdomen: Soft, non tender, + BS  Extremities: no cyanosis or clubbing.  No significant edema    LABs:  CBC:   Recent Labs      12/17/18   0559  12/18/18   0609   WBC  14.8*  16.7*   HGB  13.7  14.2   HCT  40.3*  43.1   PLT  861*  1,247*     BMP:   Recent Labs      12/17/18   0559  12/18/18   0609   NA  139  137   K  4.2  4.4   CO2  25  23   BUN  15  19   CREATININE  1.07  1.23*   LABGLOM  >60  >60   GLUCOSE  91  140*     ABG:  Lab Results   Component Value Date    ADD6IPN 24 12/14/2018    FIO2 50.0 12/14/2018       Lab Results   Component Value Date    POCPH 7.37 12/14/2018    POCPCO2 40 12/14/2018    POCPO2 48 12/14/2018    POCHCO3 23.1 12/14/2018    NBEA 2 12/14/2018    PBEA NOT REPORTED 12/14/2018    TCI0CNB 24 12/14/2018    LYQJ0JXR 83 12/14/2018    FIO2 50.0 12/14/2018     Radiology:  12/17/18      Impression:  · Acute respiratory failure  · Acute exacerbation of COPD  · History of 30-pack-year smoking, quit in 3/18  · Atelectasis  · Peripheral vascular disease/ASCVD    Recommendations:  · Xopenex and Ipratropium Q 4 hours and prn  · Dulera 200  · Prednisone taper  · Singulair  · Encourage incentive spirometry every hour while awake. · BiPAP, when necessary  · 2 liters/min via nasal cannula  · DVT prophylaxis with low molecular weight heparin  · OK for discharge planning from pulmonary stand point with follow up with pulmonary in 2-3 weeks. Plan was discussed with patient and he understands it.     Stefany Fang MD, [facility]  Pulmonary Critical Care and Sleep Medicine,  [facility]  Cell: [phone]  Office: [phone]

## 2018-12-19 NOTE — FLOWSHEET NOTE
RN notified Dr. Ian Anderson via perfect serve message that the patient's spouse is at the bedside and is requesting to speak with Dr. Ian Anderson. Awaiting response at this time.

## 2018-12-19 NOTE — PROGRESS NOTES
[facility]      Daily Progress Note     Admit Date: 12/13/2018  Bed/Room No.  2031/2031-01  Admitting Physician : Albert Barone MD  Code Status :[address] Day:  LOS: 6 days   Chief Complaint:     Worsening leg pain on walking. Principal Problem:    Peripheral vascular disease ([facility]  Active Problems: Thrombocytosis (HCC)    Moderate malnutrition (HCC)    COPD (chronic obstructive pulmonary disease) (HCC)    Pain, lower extremity    Kidney disease, chronic, stage III (GFR 30-59 ml/min) (HCC)    Hypertension    CAD (coronary artery disease)    Marijuana user    Normocytic anemia  Resolved Problems:    * No resolved hospital problems. *    Subjective : Interval History/Significant events :  12/19/18    Patient is having some abd discomfort. Passing flatus . Constipated . Remains on Oxygen supplement. denies any  cough or wheezing ,.   Vitals - Stable afebrile  Labs - persistent leucocytosis and thrombocytosis. Nursing notes , Consults notes reviewed. Overnight events and updates discussed with Nursing staff . Background History:         David Tobar is 51 y.o. male  Who was admitted to the hospital on 12/13/2018 for treatment of Peripheral vascular disease ([facility] [address] Patient came to emergency room with worsening leg pain on walking. Patient has known PVD and had right common iliac stent placed on 5/8/18. Patient underwent aortobifemoral bypass on 12/13/18 under general anesthesia and had postoperative respiratory failure likely secondary to underlying COPD. Patient was treated with BiPAP and systemic steroids and bronchodilator treatment. Patient also received Precedex for agitation. He continued to have pain on walking. Patient has underlying history of COPD, CAD, stage III chronic kidney disease, current nicotine use.      PMH:  Past Medical History:   Diagnosis Date    Bandemia     CAD (coronary artery disease)     Cerebral artery occlusion with cerebral wound with staples. Lymphadenopathy:     He has no cervical adenopathy. Neurological: He is alert and oriented to person, place, and time. No cranial nerve deficit. He exhibits normal muscle tone. Skin: Skin is warm. No rash noted. He is not diaphoretic. Psychiatric: He has a normal mood and affect. His behavior is normal.   Nursing note and vitals reviewed. Lower Extremities : No ankle Edema , No calf Tenderness     Laboratory findings:    Recent Labs      12/16/18   0916  12/17/18   0559  12/18/18   0609   WBC  15.0*  14.8*  16.7*   HGB  12.9*  13.7  14.2   HCT  37.5*  40.3*  43.1   PLT  807*  861*  1,247*     Recent Labs      12/16/18   0916  12/17/18   0559  12/18/18   0609   NA  136  139  137   K  4.2  4.2  4.4   CL  97*  98  96*   CO2  23  25  23   GLUCOSE  97  91  140*   BUN  15  15  19   CREATININE  1.09  1.07  1.23*   CALCIUM  8.7  9.0  9.6     Recent Labs      12/17/18   0559   LDH  227*          Specific Gravity, UA   Date Value Ref Range Status   12/14/2018 1.020 1.005 - 1.030 Final     Protein, UA   Date Value Ref Range Status   12/14/2018 1+ (A) NEG Final     RBC, UA   Date Value Ref Range Status   12/14/2018 2 TO 5 0 - 2 /HPF Final     Bacteria, UA   Date Value Ref Range Status   12/14/2018 NOT REPORTED NONE Final     Nitrite, Urine   Date Value Ref Range Status   12/14/2018 NEGATIVE NEG Final     WBC, UA   Date Value Ref Range Status   12/14/2018 2 TO 5 0 - 5 /HPF Final     Leukocyte Esterase, Urine   Date Value Ref Range Status   12/14/2018 NEGATIVE NEG Final     PVR 10/5/18  Right OLESYA of 0.46 is consistent with severe arterial insufficiency and    suggestive of aortoiliac occlusive disease. Left OLESYA of 1.04 is within    normal limits. Exam is essentially unchanged from his previous study in   [facility] 2018. Clinical Course : stable  Assessment and Plan  :        1. Peripheral vascular disease with claudication - s/p aortobifemoral bypass 12/13/18. Continue Lipitor , Plavix.  Abstain

## 2018-12-20 LAB
CULTURE: NORMAL
CULTURE: NORMAL
Lab: NORMAL
Lab: NORMAL
SPECIMEN DESCRIPTION: NORMAL
SPECIMEN DESCRIPTION: NORMAL
STATUS: NORMAL
STATUS: NORMAL

## 2018-12-21 ENCOUNTER — TELEPHONE (OUTPATIENT)
Dept: INTERNAL MEDICINE | Age: 48
End: 2018-12-21

## 2018-12-23 LAB
BCR-ABL QUANTITATIVE: NOT DETECTED
BCR-ABL1, MAJOR, RATIO: 0
BCR-ABL1, PERCENT: 0 %
BCR/ABL SOURCE: NORMAL
EER BCR-ABL1, MAJOR: NORMAL
V617F MUTATION, QNT: 21.3 %

## 2018-12-26 ENCOUNTER — HOSPITAL ENCOUNTER (OUTPATIENT)
Facility: MEDICAL CENTER | Age: 48
End: 2018-12-26
Payer: COMMERCIAL

## 2019-01-07 ENCOUNTER — OFFICE VISIT (OUTPATIENT)
Dept: INTERNAL MEDICINE | Age: 49
End: 2019-01-07
Payer: COMMERCIAL

## 2019-01-07 VITALS
HEART RATE: 71 BPM | DIASTOLIC BLOOD PRESSURE: 112 MMHG | HEIGHT: 74 IN | BODY MASS INDEX: 18.22 KG/M2 | SYSTOLIC BLOOD PRESSURE: 148 MMHG | WEIGHT: 142 LBS

## 2019-01-07 DIAGNOSIS — J41.0 SIMPLE CHRONIC BRONCHITIS (HCC): ICD-10-CM

## 2019-01-07 DIAGNOSIS — G47.00 INSOMNIA, UNSPECIFIED TYPE: Primary | ICD-10-CM

## 2019-01-07 DIAGNOSIS — I10 ESSENTIAL HYPERTENSION: ICD-10-CM

## 2019-01-07 DIAGNOSIS — D75.839 THROMBOCYTOSIS: ICD-10-CM

## 2019-01-07 DIAGNOSIS — I73.9 PERIPHERAL VASCULAR DISEASE (HCC): ICD-10-CM

## 2019-01-07 PROCEDURE — G8926 SPIRO NO PERF OR DOC: HCPCS | Performed by: INTERNAL MEDICINE

## 2019-01-07 PROCEDURE — 99211 OFF/OP EST MAY X REQ PHY/QHP: CPT | Performed by: INTERNAL MEDICINE

## 2019-01-07 PROCEDURE — G8427 DOCREV CUR MEDS BY ELIG CLIN: HCPCS | Performed by: INTERNAL MEDICINE

## 2019-01-07 PROCEDURE — 1036F TOBACCO NON-USER: CPT | Performed by: INTERNAL MEDICINE

## 2019-01-07 PROCEDURE — G8598 ASA/ANTIPLAT THER USED: HCPCS | Performed by: INTERNAL MEDICINE

## 2019-01-07 PROCEDURE — 3023F SPIROM DOC REV: CPT | Performed by: INTERNAL MEDICINE

## 2019-01-07 PROCEDURE — G8419 CALC BMI OUT NRM PARAM NOF/U: HCPCS | Performed by: INTERNAL MEDICINE

## 2019-01-07 PROCEDURE — G8484 FLU IMMUNIZE NO ADMIN: HCPCS | Performed by: INTERNAL MEDICINE

## 2019-01-07 PROCEDURE — 99214 OFFICE O/P EST MOD 30 MIN: CPT | Performed by: INTERNAL MEDICINE

## 2019-01-07 RX ORDER — AMLODIPINE BESYLATE 10 MG/1
10 TABLET ORAL DAILY
Qty: 30 TABLET | Refills: 2 | Status: SHIPPED | OUTPATIENT
Start: 2019-01-07 | End: 2019-03-27 | Stop reason: SDUPTHER

## 2019-01-07 RX ORDER — TRAZODONE HYDROCHLORIDE 50 MG/1
50 TABLET ORAL NIGHTLY
Qty: 30 TABLET | Refills: 2 | Status: SHIPPED | OUTPATIENT
Start: 2019-01-07 | End: 2019-03-27 | Stop reason: SDUPTHER

## 2019-01-08 ENCOUNTER — TELEPHONE (OUTPATIENT)
Dept: ONCOLOGY | Age: 49
End: 2019-01-08

## 2019-01-08 ENCOUNTER — OFFICE VISIT (OUTPATIENT)
Dept: ONCOLOGY | Age: 49
End: 2019-01-08
Payer: COMMERCIAL

## 2019-01-08 VITALS
SYSTOLIC BLOOD PRESSURE: 140 MMHG | HEART RATE: 66 BPM | BODY MASS INDEX: 17.26 KG/M2 | TEMPERATURE: 98 F | DIASTOLIC BLOOD PRESSURE: 97 MMHG | WEIGHT: 134.4 LBS

## 2019-01-08 DIAGNOSIS — D75.839 THROMBOCYTOSIS: ICD-10-CM

## 2019-01-08 DIAGNOSIS — D47.1 MYELOPROLIFERATIVE DISORDER (HCC): Primary | ICD-10-CM

## 2019-01-08 DIAGNOSIS — D64.9 NORMOCYTIC ANEMIA: ICD-10-CM

## 2019-01-08 PROCEDURE — 99211 OFF/OP EST MAY X REQ PHY/QHP: CPT

## 2019-01-08 PROCEDURE — 99214 OFFICE O/P EST MOD 30 MIN: CPT | Performed by: INTERNAL MEDICINE

## 2019-01-08 PROCEDURE — G8427 DOCREV CUR MEDS BY ELIG CLIN: HCPCS | Performed by: INTERNAL MEDICINE

## 2019-01-08 PROCEDURE — G8598 ASA/ANTIPLAT THER USED: HCPCS | Performed by: INTERNAL MEDICINE

## 2019-01-08 PROCEDURE — G8419 CALC BMI OUT NRM PARAM NOF/U: HCPCS | Performed by: INTERNAL MEDICINE

## 2019-01-08 PROCEDURE — 1036F TOBACCO NON-USER: CPT | Performed by: INTERNAL MEDICINE

## 2019-01-08 PROCEDURE — G8484 FLU IMMUNIZE NO ADMIN: HCPCS | Performed by: INTERNAL MEDICINE

## 2019-01-08 RX ORDER — HYDROXYUREA 500 MG/1
500 CAPSULE ORAL 2 TIMES DAILY
Qty: 60 CAPSULE | Refills: 3 | Status: SHIPPED | OUTPATIENT
Start: 2019-01-08 | End: 2019-10-01 | Stop reason: SDUPTHER

## 2019-01-15 ENCOUNTER — OFFICE VISIT (OUTPATIENT)
Dept: ONCOLOGY | Age: 49
End: 2019-01-15
Payer: COMMERCIAL

## 2019-01-15 ENCOUNTER — HOSPITAL ENCOUNTER (OUTPATIENT)
Dept: INFUSION THERAPY | Facility: MEDICAL CENTER | Age: 49
Discharge: HOME | End: 2019-01-15
Payer: COMMERCIAL

## 2019-01-15 ENCOUNTER — HOSPITAL ENCOUNTER (OUTPATIENT)
Facility: MEDICAL CENTER | Age: 49
End: 2019-01-15
Payer: COMMERCIAL

## 2019-01-15 ENCOUNTER — TELEPHONE (OUTPATIENT)
Dept: ONCOLOGY | Age: 49
End: 2019-01-15

## 2019-01-15 VITALS
HEART RATE: 65 BPM | BODY MASS INDEX: 17.72 KG/M2 | TEMPERATURE: 98.1 F | DIASTOLIC BLOOD PRESSURE: 89 MMHG | WEIGHT: 138 LBS | SYSTOLIC BLOOD PRESSURE: 127 MMHG | RESPIRATION RATE: 16 BRPM

## 2019-01-15 DIAGNOSIS — D47.1 MYELOPROLIFERATIVE DISORDER (HCC): Primary | ICD-10-CM

## 2019-01-15 LAB
ABSOLUTE EOS #: 0.58 K/UL (ref 0–0.4)
ABSOLUTE IMMATURE GRANULOCYTE: ABNORMAL K/UL (ref 0–0.3)
ABSOLUTE LYMPH #: 2.19 K/UL (ref 1–4.8)
ABSOLUTE MONO #: 1.02 K/UL (ref 0.2–0.8)
ABSOLUTE RETIC #: 0.11 M/UL (ref 0.02–0.1)
BASOPHILS # BLD: 1 %
BASOPHILS ABSOLUTE: 0.15 K/UL (ref 0–0.2)
DIFFERENTIAL TYPE: ABNORMAL
EOSINOPHILS RELATIVE PERCENT: 4 % (ref 1–4)
HCT VFR BLD CALC: 36.8 % (ref 41–53)
HEMOGLOBIN: 12.2 G/DL (ref 13.5–17.5)
IMMATURE GRANULOCYTES: ABNORMAL %
IMMATURE RETIC FRACT: ABNORMAL %
LYMPHOCYTES # BLD: 15 % (ref 24–44)
MCH RBC QN AUTO: 27.6 PG (ref 26–34)
MCHC RBC AUTO-ENTMCNC: 33.2 G/DL (ref 31–37)
MCV RBC AUTO: 83 FL (ref 80–100)
MONOCYTES # BLD: 7 % (ref 1–7)
NRBC AUTOMATED: ABNORMAL PER 100 WBC
PDW BLD-RTO: 18.2 % (ref 11.5–14.5)
PLATELET # BLD: 1586 K/UL (ref 130–400)
PLATELET ESTIMATE: ABNORMAL
PMV BLD AUTO: 7.5 FL (ref 6–12)
RBC # BLD: 4.43 M/UL (ref 4.5–5.9)
RBC # BLD: ABNORMAL 10*6/UL
RETIC %: 2.4 % (ref 0.5–2)
RETIC HEMOGLOBIN: ABNORMAL PG (ref 28.2–35.7)
SEG NEUTROPHILS: 73 % (ref 36–66)
SEGMENTED NEUTROPHILS ABSOLUTE COUNT: 10.66 K/UL (ref 1.8–7.7)
WBC # BLD: 14.6 K/UL (ref 3.5–11)
WBC # BLD: ABNORMAL 10*3/UL

## 2019-01-15 PROCEDURE — 99211 OFF/OP EST MAY X REQ PHY/QHP: CPT

## 2019-01-15 PROCEDURE — 88184 FLOWCYTOMETRY/ TC 1 MARKER: CPT

## 2019-01-15 PROCEDURE — 38222 DX BONE MARROW BX & ASPIR: CPT | Performed by: INTERNAL MEDICINE

## 2019-01-15 PROCEDURE — 88305 TISSUE EXAM BY PATHOLOGIST: CPT

## 2019-01-15 PROCEDURE — 88185 FLOWCYTOMETRY/TC ADD-ON: CPT

## 2019-01-15 PROCEDURE — 88264 CHROMOSOME ANALYSIS 20-25: CPT

## 2019-01-15 PROCEDURE — 85025 COMPLETE CBC W/AUTO DIFF WBC: CPT

## 2019-01-15 PROCEDURE — 88237 TISSUE CULTURE BONE MARROW: CPT

## 2019-01-15 PROCEDURE — 36415 COLL VENOUS BLD VENIPUNCTURE: CPT

## 2019-01-15 PROCEDURE — 88311 DECALCIFY TISSUE: CPT

## 2019-01-15 PROCEDURE — 88280 CHROMOSOME KARYOTYPE STUDY: CPT

## 2019-01-15 PROCEDURE — 38221 DX BONE MARROW BIOPSIES: CPT | Performed by: INTERNAL MEDICINE

## 2019-01-15 PROCEDURE — 88313 SPECIAL STAINS GROUP 2: CPT

## 2019-01-15 PROCEDURE — 85045 AUTOMATED RETICULOCYTE COUNT: CPT

## 2019-01-16 LAB — BONE MARROW REPORT: NORMAL

## 2019-01-18 LAB — FLOW CYTOMETRY, BM: NORMAL

## 2019-01-19 LAB — CHROMOSOME STUDY: NORMAL

## 2019-01-21 ENCOUNTER — TELEPHONE (OUTPATIENT)
Dept: INTERNAL MEDICINE | Age: 49
End: 2019-01-21

## 2019-01-31 ENCOUNTER — HOSPITAL ENCOUNTER (OUTPATIENT)
Facility: MEDICAL CENTER | Age: 49
End: 2019-01-31

## 2019-01-31 ENCOUNTER — HOSPITAL ENCOUNTER (OUTPATIENT)
Facility: MEDICAL CENTER | Age: 49
Discharge: HOME | End: 2019-01-31
Payer: COMMERCIAL

## 2019-01-31 ENCOUNTER — TELEPHONE (OUTPATIENT)
Dept: ONCOLOGY | Age: 49
End: 2019-01-31

## 2019-01-31 ENCOUNTER — OFFICE VISIT (OUTPATIENT)
Dept: ONCOLOGY | Age: 49
End: 2019-01-31
Payer: COMMERCIAL

## 2019-01-31 VITALS
WEIGHT: 142.3 LBS | TEMPERATURE: 97.4 F | DIASTOLIC BLOOD PRESSURE: 83 MMHG | SYSTOLIC BLOOD PRESSURE: 125 MMHG | RESPIRATION RATE: 18 BRPM | HEART RATE: 55 BPM | BODY MASS INDEX: 18.27 KG/M2

## 2019-01-31 DIAGNOSIS — D47.3 ESSENTIAL THROMBOCYTHEMIA (HCC): Primary | ICD-10-CM

## 2019-01-31 DIAGNOSIS — D64.9 NORMOCYTIC ANEMIA: ICD-10-CM

## 2019-01-31 DIAGNOSIS — I73.9 PERIPHERAL VASCULAR DISEASE (HCC): ICD-10-CM

## 2019-01-31 DIAGNOSIS — D47.3 ESSENTIAL THROMBOCYTHEMIA (HCC): ICD-10-CM

## 2019-01-31 LAB
ABSOLUTE EOS #: 0.16 K/UL (ref 0–0.4)
ABSOLUTE IMMATURE GRANULOCYTE: ABNORMAL K/UL (ref 0–0.3)
ABSOLUTE LYMPH #: 1.19 K/UL (ref 1–4.8)
ABSOLUTE MONO #: 0.32 K/UL (ref 0.2–0.8)
BASOPHILS # BLD: 1 %
BASOPHILS ABSOLUTE: 0.05 K/UL (ref 0–0.2)
DIFFERENTIAL TYPE: ABNORMAL
EOSINOPHILS RELATIVE PERCENT: 3 % (ref 1–4)
HCT VFR BLD CALC: 31.8 % (ref 41–53)
HEMOGLOBIN: 10.7 G/DL (ref 13.5–17.5)
IMMATURE GRANULOCYTES: ABNORMAL %
LYMPHOCYTES # BLD: 22 % (ref 24–44)
MCH RBC QN AUTO: 29.5 PG (ref 26–34)
MCHC RBC AUTO-ENTMCNC: 33.7 G/DL (ref 31–37)
MCV RBC AUTO: 87.3 FL (ref 80–100)
MONOCYTES # BLD: 6 % (ref 1–7)
NRBC AUTOMATED: ABNORMAL PER 100 WBC
PDW BLD-RTO: 19.5 % (ref 11.5–14.5)
PLATELET # BLD: 676 K/UL (ref 130–400)
PLATELET ESTIMATE: ABNORMAL
PMV BLD AUTO: 6.8 FL (ref 6–12)
RBC # BLD: 3.64 M/UL (ref 4.5–5.9)
RBC # BLD: ABNORMAL 10*6/UL
SEG NEUTROPHILS: 68 % (ref 36–66)
SEGMENTED NEUTROPHILS ABSOLUTE COUNT: 3.68 K/UL (ref 1.8–7.7)
WBC # BLD: 5.4 K/UL (ref 3.5–11)
WBC # BLD: ABNORMAL 10*3/UL

## 2019-01-31 PROCEDURE — G8484 FLU IMMUNIZE NO ADMIN: HCPCS | Performed by: INTERNAL MEDICINE

## 2019-01-31 PROCEDURE — G8598 ASA/ANTIPLAT THER USED: HCPCS | Performed by: INTERNAL MEDICINE

## 2019-01-31 PROCEDURE — 85025 COMPLETE CBC W/AUTO DIFF WBC: CPT

## 2019-01-31 PROCEDURE — 1036F TOBACCO NON-USER: CPT | Performed by: INTERNAL MEDICINE

## 2019-01-31 PROCEDURE — 36415 COLL VENOUS BLD VENIPUNCTURE: CPT

## 2019-01-31 PROCEDURE — 99214 OFFICE O/P EST MOD 30 MIN: CPT | Performed by: INTERNAL MEDICINE

## 2019-01-31 PROCEDURE — 99211 OFF/OP EST MAY X REQ PHY/QHP: CPT | Performed by: INTERNAL MEDICINE

## 2019-01-31 PROCEDURE — G8427 DOCREV CUR MEDS BY ELIG CLIN: HCPCS | Performed by: INTERNAL MEDICINE

## 2019-01-31 PROCEDURE — G8419 CALC BMI OUT NRM PARAM NOF/U: HCPCS | Performed by: INTERNAL MEDICINE

## 2019-02-06 ENCOUNTER — OFFICE VISIT (OUTPATIENT)
Dept: INTERNAL MEDICINE | Age: 49
End: 2019-02-06
Payer: COMMERCIAL

## 2019-02-06 VITALS
DIASTOLIC BLOOD PRESSURE: 84 MMHG | HEIGHT: 74 IN | SYSTOLIC BLOOD PRESSURE: 126 MMHG | WEIGHT: 139 LBS | BODY MASS INDEX: 17.84 KG/M2 | HEART RATE: 62 BPM

## 2019-02-06 DIAGNOSIS — I10 ESSENTIAL HYPERTENSION: ICD-10-CM

## 2019-02-06 DIAGNOSIS — I73.9 PERIPHERAL VASCULAR DISEASE (HCC): Primary | ICD-10-CM

## 2019-02-06 DIAGNOSIS — J44.9 CHRONIC OBSTRUCTIVE PULMONARY DISEASE, UNSPECIFIED COPD TYPE (HCC): ICD-10-CM

## 2019-02-06 PROCEDURE — G8419 CALC BMI OUT NRM PARAM NOF/U: HCPCS | Performed by: INTERNAL MEDICINE

## 2019-02-06 PROCEDURE — 99214 OFFICE O/P EST MOD 30 MIN: CPT | Performed by: INTERNAL MEDICINE

## 2019-02-06 PROCEDURE — G8484 FLU IMMUNIZE NO ADMIN: HCPCS | Performed by: INTERNAL MEDICINE

## 2019-02-06 PROCEDURE — G8598 ASA/ANTIPLAT THER USED: HCPCS | Performed by: INTERNAL MEDICINE

## 2019-02-06 PROCEDURE — 3023F SPIROM DOC REV: CPT | Performed by: INTERNAL MEDICINE

## 2019-02-06 PROCEDURE — G8427 DOCREV CUR MEDS BY ELIG CLIN: HCPCS | Performed by: INTERNAL MEDICINE

## 2019-02-06 PROCEDURE — 99211 OFF/OP EST MAY X REQ PHY/QHP: CPT | Performed by: INTERNAL MEDICINE

## 2019-02-06 PROCEDURE — G8926 SPIRO NO PERF OR DOC: HCPCS | Performed by: INTERNAL MEDICINE

## 2019-02-06 PROCEDURE — 1036F TOBACCO NON-USER: CPT | Performed by: INTERNAL MEDICINE

## 2019-02-06 RX ORDER — ALBUTEROL SULFATE 90 UG/1
2 AEROSOL, METERED RESPIRATORY (INHALATION) EVERY 6 HOURS PRN
Qty: 1 INHALER | Refills: 5 | Status: SHIPPED | OUTPATIENT
Start: 2019-02-06 | End: 2019-09-20 | Stop reason: SDUPTHER

## 2019-02-06 RX ORDER — IPRATROPIUM/ALBUTEROL SULFATE 20-100 MCG
1 MIST INHALER (GRAM) INHALATION EVERY 6 HOURS
Qty: 1 INHALER | Refills: 3 | Status: SHIPPED | OUTPATIENT
Start: 2019-02-06 | End: 2019-09-20 | Stop reason: SDUPTHER

## 2019-02-06 ASSESSMENT — PATIENT HEALTH QUESTIONNAIRE - PHQ9
SUM OF ALL RESPONSES TO PHQ QUESTIONS 1-9: 0
2. FEELING DOWN, DEPRESSED OR HOPELESS: 0
SUM OF ALL RESPONSES TO PHQ9 QUESTIONS 1 & 2: 0
1. LITTLE INTEREST OR PLEASURE IN DOING THINGS: 0
SUM OF ALL RESPONSES TO PHQ QUESTIONS 1-9: 0

## 2019-02-08 ENCOUNTER — HOSPITAL ENCOUNTER (OUTPATIENT)
Facility: MEDICAL CENTER | Age: 49
End: 2019-02-08
Payer: COMMERCIAL

## 2019-02-18 ENCOUNTER — TELEPHONE (OUTPATIENT)
Dept: ONCOLOGY | Age: 49
End: 2019-02-18

## 2019-02-28 ENCOUNTER — TELEPHONE (OUTPATIENT)
Dept: ONCOLOGY | Age: 49
End: 2019-02-28

## 2019-02-28 ENCOUNTER — HOSPITAL ENCOUNTER (OUTPATIENT)
Facility: MEDICAL CENTER | Age: 49
Discharge: HOME | End: 2019-02-28
Payer: COMMERCIAL

## 2019-02-28 DIAGNOSIS — D47.3 ESSENTIAL THROMBOCYTHEMIA (HCC): ICD-10-CM

## 2019-02-28 DIAGNOSIS — I73.9 PVD (PERIPHERAL VASCULAR DISEASE) (HCC): ICD-10-CM

## 2019-02-28 LAB
ABSOLUTE EOS #: 0.13 K/UL (ref 0–0.4)
ABSOLUTE IMMATURE GRANULOCYTE: ABNORMAL K/UL (ref 0–0.3)
ABSOLUTE LYMPH #: 1.36 K/UL (ref 1–4.8)
ABSOLUTE MONO #: 0.22 K/UL (ref 0.2–0.8)
BASOPHILS # BLD: 2 % (ref 0–2)
BASOPHILS ABSOLUTE: 0.09 K/UL (ref 0–0.2)
DIFFERENTIAL TYPE: ABNORMAL
EOSINOPHILS RELATIVE PERCENT: 3 % (ref 1–4)
HCT VFR BLD CALC: 30.1 % (ref 41–53)
HEMOGLOBIN: 10.5 G/DL (ref 13.5–17.5)
IMMATURE GRANULOCYTES: ABNORMAL %
LYMPHOCYTES # BLD: 31 % (ref 24–44)
MCH RBC QN AUTO: 34.1 PG (ref 26–34)
MCHC RBC AUTO-ENTMCNC: 34.9 G/DL (ref 31–37)
MCV RBC AUTO: 97.5 FL (ref 80–100)
MONOCYTES # BLD: 5 % (ref 1–7)
MORPHOLOGY: ABNORMAL
NRBC AUTOMATED: ABNORMAL PER 100 WBC
PDW BLD-RTO: 34.9 % (ref 11.5–14.5)
PLATELET # BLD: 336 K/UL (ref 130–400)
PLATELET ESTIMATE: ABNORMAL
PMV BLD AUTO: 6.1 FL (ref 6–12)
RBC # BLD: 3.09 M/UL (ref 4.5–5.9)
RBC # BLD: ABNORMAL 10*6/UL
SEG NEUTROPHILS: 59 % (ref 36–66)
SEGMENTED NEUTROPHILS ABSOLUTE COUNT: 2.6 K/UL (ref 1.8–7.7)
WBC # BLD: 4.4 K/UL (ref 3.5–11)
WBC # BLD: ABNORMAL 10*3/UL

## 2019-02-28 PROCEDURE — 85025 COMPLETE CBC W/AUTO DIFF WBC: CPT

## 2019-02-28 PROCEDURE — 36415 COLL VENOUS BLD VENIPUNCTURE: CPT

## 2019-02-28 RX ORDER — CLOPIDOGREL BISULFATE 75 MG/1
TABLET ORAL
Qty: 90 TABLET | Refills: 1 | Status: SHIPPED | OUTPATIENT
Start: 2019-02-28 | End: 2019-09-16 | Stop reason: SDUPTHER

## 2019-03-04 ENCOUNTER — HOSPITAL ENCOUNTER (OUTPATIENT)
Facility: MEDICAL CENTER | Age: 49
End: 2019-03-04
Payer: COMMERCIAL

## 2019-03-17 ENCOUNTER — TELEPHONE (OUTPATIENT)
Dept: INTERNAL MEDICINE CLINIC | Age: 49
End: 2019-03-17

## 2019-03-27 DIAGNOSIS — G47.00 INSOMNIA, UNSPECIFIED TYPE: ICD-10-CM

## 2019-03-27 DIAGNOSIS — I10 ESSENTIAL HYPERTENSION: ICD-10-CM

## 2019-03-27 RX ORDER — TRAZODONE HYDROCHLORIDE 50 MG/1
TABLET ORAL
Qty: 90 TABLET | Refills: 2 | Status: SHIPPED | OUTPATIENT
Start: 2019-03-27 | End: 2019-09-20 | Stop reason: SDUPTHER

## 2019-03-27 RX ORDER — AMLODIPINE BESYLATE 10 MG/1
TABLET ORAL
Qty: 90 TABLET | Refills: 2 | Status: SHIPPED | OUTPATIENT
Start: 2019-03-27 | End: 2019-09-20 | Stop reason: SDUPTHER

## 2019-04-02 ENCOUNTER — TELEPHONE (OUTPATIENT)
Dept: ONCOLOGY | Age: 49
End: 2019-04-02

## 2019-04-02 ENCOUNTER — HOSPITAL ENCOUNTER (OUTPATIENT)
Facility: MEDICAL CENTER | Age: 49
Discharge: HOME | End: 2019-04-02
Payer: COMMERCIAL

## 2019-04-02 ENCOUNTER — OFFICE VISIT (OUTPATIENT)
Dept: ONCOLOGY | Age: 49
End: 2019-04-02
Payer: COMMERCIAL

## 2019-04-02 VITALS
SYSTOLIC BLOOD PRESSURE: 120 MMHG | TEMPERATURE: 97.9 F | WEIGHT: 140.7 LBS | HEART RATE: 61 BPM | DIASTOLIC BLOOD PRESSURE: 87 MMHG | RESPIRATION RATE: 18 BRPM | BODY MASS INDEX: 18.06 KG/M2

## 2019-04-02 DIAGNOSIS — D47.3 ESSENTIAL THROMBOCYTHEMIA (HCC): Primary | ICD-10-CM

## 2019-04-02 DIAGNOSIS — D47.3 ESSENTIAL THROMBOCYTHEMIA (HCC): ICD-10-CM

## 2019-04-02 LAB
ABSOLUTE EOS #: 0.08 K/UL (ref 0–0.4)
ABSOLUTE IMMATURE GRANULOCYTE: ABNORMAL K/UL (ref 0–0.3)
ABSOLUTE LYMPH #: 1.16 K/UL (ref 1–4.8)
ABSOLUTE MONO #: 0.4 K/UL (ref 0.2–0.8)
BASOPHILS # BLD: 1 % (ref 0–2)
BASOPHILS ABSOLUTE: 0.04 K/UL (ref 0–0.2)
DIFFERENTIAL TYPE: ABNORMAL
EOSINOPHILS RELATIVE PERCENT: 2 % (ref 1–4)
HCT VFR BLD CALC: 24.6 % (ref 41–53)
HEMOGLOBIN: 8.9 G/DL (ref 13.5–17.5)
IMMATURE GRANULOCYTES: ABNORMAL %
LYMPHOCYTES # BLD: 29 % (ref 24–44)
MCH RBC QN AUTO: 39.3 PG (ref 26–34)
MCHC RBC AUTO-ENTMCNC: 36.3 G/DL (ref 31–37)
MCV RBC AUTO: 108.1 FL (ref 80–100)
MONOCYTES # BLD: 10 % (ref 1–7)
MORPHOLOGY: ABNORMAL
NRBC AUTOMATED: ABNORMAL PER 100 WBC
PDW BLD-RTO: 35.7 % (ref 11.5–14.5)
PLATELET # BLD: 256 K/UL (ref 130–400)
PLATELET ESTIMATE: ABNORMAL
PMV BLD AUTO: 5.9 FL (ref 6–12)
RBC # BLD: 2.28 M/UL (ref 4.5–5.9)
RBC # BLD: ABNORMAL 10*6/UL
SEG NEUTROPHILS: 58 % (ref 36–66)
SEGMENTED NEUTROPHILS ABSOLUTE COUNT: 2.32 K/UL (ref 1.8–7.7)
WBC # BLD: 4 K/UL (ref 3.5–11)
WBC # BLD: ABNORMAL 10*3/UL

## 2019-04-02 PROCEDURE — 85025 COMPLETE CBC W/AUTO DIFF WBC: CPT

## 2019-04-02 PROCEDURE — 99211 OFF/OP EST MAY X REQ PHY/QHP: CPT

## 2019-04-02 PROCEDURE — 36415 COLL VENOUS BLD VENIPUNCTURE: CPT

## 2019-04-02 PROCEDURE — 1036F TOBACCO NON-USER: CPT | Performed by: INTERNAL MEDICINE

## 2019-04-02 PROCEDURE — G8419 CALC BMI OUT NRM PARAM NOF/U: HCPCS | Performed by: INTERNAL MEDICINE

## 2019-04-02 PROCEDURE — G8427 DOCREV CUR MEDS BY ELIG CLIN: HCPCS | Performed by: INTERNAL MEDICINE

## 2019-04-02 PROCEDURE — 99214 OFFICE O/P EST MOD 30 MIN: CPT | Performed by: INTERNAL MEDICINE

## 2019-04-02 PROCEDURE — G8598 ASA/ANTIPLAT THER USED: HCPCS | Performed by: INTERNAL MEDICINE

## 2019-04-02 RX ORDER — HYDROXYUREA 500 MG/1
500 CAPSULE ORAL DAILY
Qty: 60 CAPSULE | Refills: 1 | Status: SHIPPED | OUTPATIENT
Start: 2019-04-02 | End: 2019-06-12 | Stop reason: SDUPTHER

## 2019-04-02 NOTE — TELEPHONE ENCOUNTER
SHANTE[facility] ARRIVES AMBULATORY FOR MD VISIT  DR Truett Cooks INTO SEE PATIENT  ORDERS RECEIVED  MAKE HYDREA ONCE DAILY RV 2 MONTHS W/CDP  LABS CDP 06/12/19  MD VISIT 06/12/19 @3PM  AVS PRINTED AND GIVEN TO PATIENT WITH INSTRUCTIONS  PATIENT DISCHARGED AMBULATORY

## 2019-04-02 NOTE — PROGRESS NOTES
_      Chief Complaint   Patient presents with    Follow-up     review status of disease    Fatigue    Other     burning feeling in right foot    Numbness     fingers    Other     fingers looks wrinkled and purplish.  pain is a 5 all the time with periods of 10.  Anorexia     doesn't eat much anymore    Pain     abdomen and sides,  self side the worse     DIAGNOSIS:       Chronic persistent leukocytosis  Chronic persistent thrombocytosis  Positive JAK2 gene mutation. Myeloproliferative disorder. Essential thrombocythemia. Chronic normocytic anemia  Peripheral vascular disease  Chronic tobacco abuse  Chronic marijuana abuse  Iron deficiency. Peripheral vascular disease. CURRENT THERAPY:         Hydrea  ASA    BRIEF CASE HISTORY:  The patient is a 51 y.o.  male who is admitted to the hospital for elective aortobifemoral bypass surgery. For the essentially patient had problems with increasing shortness of breath and hypoxia. Pulmonary has been consulted. Patient was noted to have  Persistent elevation of white blood cells and platelets. The patient states that  He was told about this problem back in November 2017 in [place]. Obviously no further workup was done. Repeated labs recently showed elevation of white blood cells and platelets. In addition he had normocytic anemia. Patient denies any fever or night sweats. No weight loss or decreased appetite. No palpable lymph nodes. Patient quit smoking 7 months ago. He was a heavy smoker 35 years. He uses marijuana. Denies alcohol drinking. INTERIM HISTORY:         The patient is seen for follow up thrombocytosis. BM test showed essential thrombocytosis. Started on hydroxyurea. Tolerated well. No side effects. Clinically stable. No chest pain. No headache. No pain in the lower extremities. No nausea or vomiting. No fever. No bleeding.        Past Medical History:   has a past medical history of Bandemia, CAD (coronary artery disease), Cerebral artery occlusion with cerebral infarction ([facility] COPD (chronic obstructive pulmonary disease) ([facility] [address] Fracture of metatarsal bone, Gangrene ([facility] Hypertension, Kidney disease, chronic, stage III (GFR 30-59 ml/min) ([facility]), Moderate malnutrition ([facility]), MRSA (methicillin resistant staph aureus) culture positive, Pain, lower extremity, Peripheral vascular disease ([facility] and Thrombocytosis ([facility] Past Surgical History:   has a past surgical history that includes Coronary angioplasty with stent; other surgical history (10/26/2018); other surgical history; Tonsillectomy; Toe amputation (Right); Aorto-femoral Bypass Graft (12/13/2018); and aortofemoral-popliteal bypass graft (N/A, 12/13/2018). Family History: family history includes Diabetes in his mother; Heart Attack in his father, maternal grandmother, maternal uncle, and paternal grandfather. Social History:   reports that he quit smoking about 10 months ago. His smoking use included cigars. He has a 35.00 pack-year smoking history. He has never used smokeless tobacco. He reports that he has current or past drug history. Drug: Marijuana. He reports that he does not drink alcohol. Medications:    Prior to Admission medications    Medication Sig Start Date End Date Taking?  Authorizing Provider   hydroxyurea (HYDREA) 500 MG chemo capsule Take 1 capsule by mouth daily 4/2/19  Yes Stephan Conn MD   traZODone (DESYREL) 50 MG tablet TAKE 1 TABLET BY MOUTH EVERY DAY AT NIGHT 3/27/19  Yes Prashanth Ruiz MD   amLODIPine (NORVASC) 10 MG tablet TAKE 1 TABLET BY MOUTH EVERY DAY 3/27/19  Yes Prashanth Ruiz MD   clopidogrel (PLAVIX) 75 MG tablet TAKE 1 TABLET BY MOUTH EVERY DAY 2/28/19  Yes Prashanth Ruiz MD   COMBIVENT RESPIMAT  MCG/ACT AERS inhaler Inhale 1 puff into the lungs every 6 hours 2/6/19  Yes Prashanth Ruiz MD   albuterol sulfate HFA (VENTOLIN HFA) 108 (90 Base) MCG/ACT inhaler Inhale 2 puffs into the lungs every 6 hours as needed for Wheezing 2/6/19  Yes Rafita Gibson MD   hydroxyurea (HYDREA) 500 MG chemo capsule Take 1 capsule by mouth 2 times daily 1/8/19  Yes Romayne Codding, MD   ipratropium-albuterol (DUONEB) 0.5-2.5 (3) MG/3ML SOLN nebulizer solution Take 3 mLs by nebulization every 4 hours as needed for Shortness of Breath 12/19/18  Yes Alphonso Salmon MD   ferrous sulfate 325 (65 Fe) MG EC tablet Take 1 tablet by mouth 2 times daily 12/19/18  Yes Alphonso Salmon MD   montelukast (SINGULAIR) 10 MG tablet Take 1 tablet by mouth nightly 12/19/18  Yes Loi Parkinson MD   atorvastatin (LIPITOR) 40 MG tablet Take 1 tablet by mouth daily 9/14/18  Yes Rafita Gibson MD   metoprolol tartrate (LOPRESSOR) 25 MG tablet Take 1 tablet by mouth 2 times daily 9/14/18  Yes Rafita Gibson MD   mometasone-formoterol [facility]) 200-5 MCG/ACT inhaler Inhale 2 puffs into the lungs every 12 hours 9/14/18  Yes Rafita Gibson MD   Gauze Pads & Dressings ([place]) [address] [facility] 30 kerlix rolls for daily dressing changes 9/5/18  Yes Radha Chapman DPM   Gauze Pads & Dressings (GAUZE DRESSING) 4\"X4\" PADS Please dispense 100 gauze pads for patient's daily dressing changes 9/5/18  Yes Radha Chapman DPM   Gauze Pads & Dressings ([place] ROLL SMALL) [address] [facility] Apply to foot 8/24/18  Yes Tammy Hager MD   sodium chloride ([place] NASAL SPRAY) 0.65 % nasal spray 1 spray by Nasal route as needed for Congestion 8/8/18  Yes Tammy Hager MD   aspirin 81 MG tablet Take 81 mg by mouth daily   Yes Historical Provider, MD     Current Outpatient Medications   Medication Sig Dispense Refill    hydroxyurea (HYDREA) 500 MG chemo capsule Take 1 capsule by mouth daily 60 capsule 1    traZODone (DESYREL) 50 MG tablet TAKE 1 TABLET BY MOUTH EVERY DAY AT NIGHT 90 tablet 2    amLODIPine (NORVASC) 10 MG tablet TAKE 1 TABLET BY MOUTH EVERY DAY 90 tablet 2    clopidogrel (PLAVIX) 75 MG tablet TAKE 1 TABLET BY MOUTH EVERY DAY 90 tablet 1    COMBIVENT RESPIMAT  MCG/ACT AERS inhaler Inhale 1 puff into the lungs every 6 hours 1 Inhaler 3    albuterol sulfate HFA (VENTOLIN HFA) 108 (90 Base) MCG/ACT inhaler Inhale 2 puffs into the lungs every 6 hours as needed for Wheezing 1 Inhaler 5    hydroxyurea (HYDREA) 500 MG chemo capsule Take 1 capsule by mouth 2 times daily 60 capsule 3    ipratropium-albuterol (DUONEB) 0.5-2.5 (3) MG/3ML SOLN nebulizer solution Take 3 mLs by nebulization every 4 hours as needed for Shortness of Breath 200 mL 1    ferrous sulfate 325 (65 Fe) MG EC tablet Take 1 tablet by mouth 2 times daily 180 tablet 1    montelukast (SINGULAIR) 10 MG tablet Take 1 tablet by mouth nightly 30 tablet 3    atorvastatin (LIPITOR) 40 MG tablet Take 1 tablet by mouth daily 90 tablet 1    metoprolol tartrate (LOPRESSOR) 25 MG tablet Take 1 tablet by mouth 2 times daily 180 tablet 1    mometasone-formoterol (DULERA) 200-5 MCG/ACT inhaler Inhale 2 puffs into the lungs every 12 hours 3 Inhaler 5    Gauze Pads & Dressings (KERLIX GAUZE ROLL LARGE) MISC 30 kerlix rolls for daily dressing changes 30 each 2    Gauze Pads & Dressings (GAUZE DRESSING) 4\"X4\" PADS Please dispense 100 gauze pads for patient's daily dressing changes 100 each 1    Gauze Pads & Dressings ([place]ER[place] GAUZE ROLL SMALL) MISC Apply to foot 96 each 1    sodium chloride (OCEAN NASAL SPRAY) 0.65 % nasal spray 1 spray by Nasal route as needed for Congestion 1 Bottle 3    aspirin 81 MG tablet Take 81 mg by mouth daily       Current Facility-Administered Medications   Medication Dose Route Frequency Provider Last Rate Last Dose    lidocaine (LMX) 4 % cream   Topical PRN Fernando Mcmanus DPM           Allergies:  Patient has no known allergies. REVIEW OF SYSTEMS:      · General: No weakness or fatigue. No unanticipated weight loss or decreased appetite. No fever or chills.    · Eyes: No blurred vision, eye pain or double vision. · Ears: No hearing problems or drainage. No tinnitus. · Throat: No sore throat, problems with swallowing or dysphagia. · Respiratory: No cough, sputum or hemoptysis. Positive for shortness of breath. No pleuritic chest pain. · Cardiovascular: No chest pain, orthopnea or PND. No lower extremity edema. No palpitation. · Gastrointestinal: No problems with swallowing. No abdominal pain or bloating. No nausea or vomiting. No diarrhea or constipation. No GI bleeding. · Genitourinary: No dysuria, hematuria, frequency or urgency. · Musculoskeletal: No muscle aches or pains. No limitation of movement. No back pain. No gait disturbance, No joint complaints. · Dermatologic: No skin rashes or pruritus. No skin lesions or discolorations. · Psychiatric: No depression, anxiety, or stress or signs of schizophrenia. No change in mood or affect. · Hematologic: No history of bleeding tendency. No bruises or ecchymosis. No history of clotting problems. · Infectious disease: No fever, chills or frequent infections. · Endocrine: No problems with obesity. No polydipsia or polyuria. No temperature intolerance. · Neurologic: No headaches or dizziness. No weakness or numbness of the extremities. No changes in balance, coordination,  memory, mentation, behavior. · Allergic/Immunologic: No nasal congestion or hives. No repeated infections.        PHYSICAL EXAM:      /87   Pulse 61   Temp 97.9 °F (36.6 °C) (Oral)   Resp 18   Wt 140 lb 11.2 oz (63.8 kg)   BMI 18.06 kg/m²    Temp (24hrs), Av.9 °F (36.6 °C), Min:97.3 °F (36.3 °C), Max:98.6 °F (37 °C)      General appearance - well appearing, not in pain or distress  Mental status - good mood, alert and oriented  Eyes - pupils equal and reactive, extraocular eye movements intact  Ears - bilateral TM's and external ear canals normal  Nose - normal and patent, no erythema, discharge or polyps  Mouth - mucous membranes moist, pharynx normal without patients with myeloproliferative neoplasms. Methodology:        BM test 1/15/19:  Final Diagnosis   PERIPHERAL BLOOD:   -SEVERE THROMBOCYTOSIS. -MILD NEUTROPHILIA. -MILD EOSINOPHILIA. -MILD NORMOCYTIC ANEMIA. BONE MARROW BIOPSY, ASPIRATE SMEAR AND CLOT SECTION:   -MILDLY HYPOCELLULAR HEMATOPOIETIC MARROW WITH MILD RELATIVE   MYELOID HYPERPLASIA/ ERYTHROID HYPOPLASIA, ENLARGED AND INCREASED   MEGAKARYOCYTES, AND IRON DEPLETION. -COMPATIBLE WITH MYELOPROLIFERATIVE NEOPLASM ((ESSENTIAL   THROMBOCYTHEMIA). COMMENT:  THE PATIENT HAS MARKED PERSISTENT THROMBOCYTOSIS AND MILD   NEUTROPHILIA.  BCR-ABL BY RT-PCR IS NEGATIVE.  JAK2 V617F MUTATION IS   POSITIVE.  THE BONE MARROW HAS INCREASED MEGAKARYOCYTES WHICH ARE   ENLARGED AND HAVE HYPERLOBATE NUCLEI.  THE FINDINGS ARE COMPATIBLE   WITH MYELOPROLIFERATIVE NEOPLASM (ESSENTIAL THROMBOCYTHEMIA) .          IMPRESSION:    Chronic persistent leukocytosis  Chronic persistent thrombocytosis  Positive JAK2 gene mutation. Myeloproliferative disorder. Essential thrombocythemia. Chronic normocytic anemia  Peripheral vascular disease  Chronic tobacco abuse  Chronic marijuana abuse  Iron deficiency. Peripheral vascular disease. RECOMMENDATIONS:  1. I reviewed the labs as above and discussed with the patient. I explained results of BM test. I explained to the patient the nature of this hematologic problem. I explained the significance of these abnormalities in layman language. 2. Myeloproliferative disorder. Explained the nature of essential thrombocythemia. 3. Patient had very good response to hydroxyurea. However he started having anemia. 4. We will continue Hydrea. Adjusted dose to be once daily. 5. Further recommendations will be based on results and response to Hydrea. 6. With patient's history of chronic tobacco abuse, he was expected to have erythrocytosis. On the contrary patient is having a normocytic anemia. He has iron deficiency.   He

## 2019-04-21 DIAGNOSIS — I10 ESSENTIAL HYPERTENSION: ICD-10-CM

## 2019-04-22 DIAGNOSIS — J44.9 CHRONIC OBSTRUCTIVE PULMONARY DISEASE, UNSPECIFIED COPD TYPE (HCC): ICD-10-CM

## 2019-04-22 RX ORDER — MONTELUKAST SODIUM 10 MG/1
10 TABLET ORAL NIGHTLY
Qty: 30 TABLET | Refills: 3 | Status: SHIPPED | OUTPATIENT
Start: 2019-04-22 | End: 2019-08-13 | Stop reason: SDUPTHER

## 2019-04-22 NOTE — TELEPHONE ENCOUNTER
Refill requested.     Last visit: 2/6/19  Last Med refill: 2/14/19  Does patient have enough medication for 72 hours: No: last filled on 2/14/19    Next Visit Date:  Future Appointments   Date Time Provider Ezequiel Booker   5/7/2019  1:15 PM Rafita Gibson MD [facility] IM [facility]   6/12/2019  3:00 [place] [address] [facility] Maintenance   Topic Date Due    HIV screen  08/10/1984    Flu vaccine (Season Ended) 09/01/2019    Potassium monitoring  12/18/2019    Creatinine monitoring  12/18/2019    Lipid screen  05/02/2023    DTaP/Tdap/Td vaccine (2 - Td) 05/09/2028    Pneumococcal 0-64 years Vaccine  Completed       Hemoglobin A1C (%)   Date Value   08/08/2018 4.9   05/02/2018 4.8             ( goal A1C is < 7)   No results found for: LABMICR  LDL Cholesterol (mg/dL)   Date Value   05/02/2018 41       (goal LDL is <100)   AST (U/L)   Date Value   11/27/2018 30     ALT (U/L)   Date Value   11/27/2018 46 (H)     BUN (mg/dL)   Date Value   12/18/2018 19     BP Readings from Last 3 Encounters:   04/02/19 120/87   02/06/19 126/84   01/31/19 125/83          (goal 120/80)    All Future Testing planned in [facility]  Lab Frequency Next Occurrence   HIV Screen Once 88/56/3119   Basic Metabolic Panel Once 65/40/7568   CBC Auto Differential                 Patient Active Problem List:     Essential hypertension     PVD (peripheral vascular disease) ([facility] [address]     Stage 3 chronic kidney disease (HCC)     Closed nondisplaced fracture of third metatarsal bone of left foot     Ischemia of toe     Critical lower limb ischemia     Gangrene of toe of left foot (HCC)     Bandemia     Thrombocytosis (HCC)     Moderate malnutrition (HCC)     Pain of lower extremity     Tobacco abuse     Ischemia of right lower extremity     COPD (chronic obstructive pulmonary disease) (HCC)     Peripheral vascular disease (HCC)     Pain, lower extremity     Kidney disease, chronic, stage III (GFR 30-59 ml/min) ([facility] [address] Hypertension     CAD (coronary artery disease)     Marijuana user     Normocytic anemia

## 2019-04-23 DIAGNOSIS — J44.9 CHRONIC OBSTRUCTIVE PULMONARY DISEASE, UNSPECIFIED COPD TYPE (HCC): ICD-10-CM

## 2019-04-23 RX ORDER — IPRATROPIUM/ALBUTEROL SULFATE 20-100 MCG
MIST INHALER (GRAM) INHALATION
Qty: 12 G | Refills: 2 | Status: SHIPPED | OUTPATIENT
Start: 2019-04-23 | End: 2019-06-12 | Stop reason: SDUPTHER

## 2019-04-23 NOTE — TELEPHONE ENCOUNTER
combivent pending for refill     Health Maintenance   Topic Date Due    HIV screen  08/10/1984    Flu vaccine (Season Ended) 09/01/2019    Potassium monitoring  12/18/2019    Creatinine monitoring  12/18/2019    Lipid screen  05/02/2023    DTaP/Tdap/Td vaccine (2 - Td) 05/09/2028    Pneumococcal 0-64 years Vaccine  Completed             (applicable per patient's age: Cancer Screenings, Depression Screening, Fall Risk Screening, Immunizations)    Hemoglobin A1C (%)   Date Value   08/08/2018 4.9   05/02/2018 4.8     LDL Cholesterol (mg/dL)   Date Value   05/02/2018 41     AST (U/L)   Date Value   11/27/2018 30     ALT (U/L)   Date Value   11/27/2018 46 (H)     BUN (mg/dL)   Date Value   12/18/2018 19      (goal A1C is < 7)   (goal LDL is <100) need 30-50% reduction from baseline     BP Readings from Last 3 Encounters:   04/02/19 120/87   02/06/19 126/84   01/31/19 125/83    (goal /80)      All Future Testing planned in [facility]:  Lab Frequency Next Occurrence   HIV Screen Once 43/33/1478   Basic Metabolic Panel Once 00/01/6869   CBC Auto Differential         Next Visit Date:  Future Appointments   Date Time Provider Ezequiel Booker   5/7/2019  1:15 PM Rashmi Islas MD [facility] IM [facility]   6/12/2019  3:00 PM Barry Gann MD [facility]            Patient Active Problem List:     Essential hypertension     PVD (peripheral vascular disease) ([address]     Stage 3 chronic kidney disease ([address]     Closed nondisplaced fracture of third metatarsal bone of left foot     Ischemia of toe     Critical lower limb ischemia     Gangrene of toe of left foot (HCC)     Bandemia     Thrombocytosis (HCC)     Moderate malnutrition (HCC)     Pain of lower extremity     Tobacco abuse     Ischemia of right lower extremity     COPD (chronic obstructive pulmonary disease) (HCC)     Peripheral vascular disease (HCC)     Pain, lower extremity     Kidney disease, chronic, stage III (GFR 30-59 ml/min) ([address] Hypertension     CAD (coronary artery disease)     Marijuana user     Normocytic anemia

## 2019-05-09 DIAGNOSIS — I73.9 PVD (PERIPHERAL VASCULAR DISEASE) (HCC): ICD-10-CM

## 2019-05-09 RX ORDER — ATORVASTATIN CALCIUM 40 MG/1
TABLET, FILM COATED ORAL
Qty: 90 TABLET | Refills: 1 | Status: SHIPPED | OUTPATIENT
Start: 2019-05-09 | End: 2019-09-20 | Stop reason: SDUPTHER

## 2019-05-09 NOTE — TELEPHONE ENCOUNTER
Leonardo request for atorvastatin    Next Visit Date:  Future Appointments   Date Time Provider Ezequiel Booker   6/12/2019  3:00 PM [place] [address] [facility] Maintenance   Topic Date Due    HIV screen  08/10/1984    Flu vaccine (Season Ended) 09/01/2019    Potassium monitoring  12/18/2019    Creatinine monitoring  12/18/2019    Lipid screen  05/02/2023    DTaP/Tdap/Td vaccine (2 - Td) 05/09/2028    Pneumococcal 0-64 years Vaccine  Completed             (applicable per patient's age: Cancer Screenings, Depression Screening, Fall Risk Screening, Immunizations)    Hemoglobin A1C (%)   Date Value   08/08/2018 4.9   05/02/2018 4.8     LDL Cholesterol (mg/dL)   Date Value   05/02/2018 41     AST (U/L)   Date Value   11/27/2018 30     ALT (U/L)   Date Value   11/27/2018 46 (H)     BUN (mg/dL)   Date Value   12/18/2018 19      (goal A1C is < 7)   (goal LDL is <100) need 30-50% reduction from baseline     BP Readings from Last 3 Encounters:   04/02/19 120/87   02/06/19 126/84   01/31/19 125/83    (goal /80)      All Future Testing planned in [facility]:  Lab Frequency Next Occurrence   HIV Screen Once 44/25/6376   Basic Metabolic Panel Once 48/77/1861   CBC Auto Differential              Patient Active Problem List:     Essential hypertension     PVD (peripheral vascular disease) (HCC)     Stage 3 chronic kidney disease (HCC)     Closed nondisplaced fracture of third metatarsal bone of left foot     Ischemia of toe     Critical lower limb ischemia     Gangrene of toe of left foot (HCC)     Bandemia     Thrombocytosis (HCC)     Moderate malnutrition (HCC)     Pain of lower extremity     Tobacco abuse     Ischemia of right lower extremity     COPD (chronic obstructive pulmonary disease) (HCC)     Peripheral vascular disease (HCC)     Pain, lower extremity     Kidney disease, chronic, stage III (GFR 30-59 ml/min) (HCC)     Hypertension     CAD (coronary artery disease)     Marijuana user     Normocytic anemia

## 2019-06-07 DIAGNOSIS — D47.3 ESSENTIAL THROMBOCYTHEMIA (HCC): Primary | ICD-10-CM

## 2019-06-11 ENCOUNTER — HOSPITAL ENCOUNTER (OUTPATIENT)
Facility: MEDICAL CENTER | Age: 49
End: 2019-06-11
Payer: COMMERCIAL

## 2019-06-12 ENCOUNTER — HOSPITAL ENCOUNTER (OUTPATIENT)
Facility: MEDICAL CENTER | Age: 49
Discharge: HOME | End: 2019-06-12
Payer: COMMERCIAL

## 2019-06-12 ENCOUNTER — TELEPHONE (OUTPATIENT)
Dept: ONCOLOGY | Age: 49
End: 2019-06-12

## 2019-06-12 ENCOUNTER — OFFICE VISIT (OUTPATIENT)
Dept: ONCOLOGY | Age: 49
End: 2019-06-12
Payer: COMMERCIAL

## 2019-06-12 VITALS
TEMPERATURE: 98.8 F | HEART RATE: 56 BPM | DIASTOLIC BLOOD PRESSURE: 78 MMHG | BODY MASS INDEX: 17.97 KG/M2 | WEIGHT: 140 LBS | SYSTOLIC BLOOD PRESSURE: 130 MMHG

## 2019-06-12 DIAGNOSIS — D47.3 ESSENTIAL THROMBOCYTHEMIA (HCC): Primary | ICD-10-CM

## 2019-06-12 DIAGNOSIS — D72.825 BANDEMIA: ICD-10-CM

## 2019-06-12 DIAGNOSIS — D64.9 NORMOCYTIC ANEMIA: ICD-10-CM

## 2019-06-12 DIAGNOSIS — D47.3 ESSENTIAL THROMBOCYTHEMIA (HCC): ICD-10-CM

## 2019-06-12 LAB
ABSOLUTE EOS #: 0.15 K/UL (ref 0–0.44)
ABSOLUTE IMMATURE GRANULOCYTE: 0 K/UL (ref 0–0.3)
ABSOLUTE LYMPH #: 1.69 K/UL (ref 1.1–3.7)
ABSOLUTE MONO #: 0.62 K/UL (ref 0.1–1.2)
BASOPHILS # BLD: 2 % (ref 0–2)
BASOPHILS ABSOLUTE: 0.15 K/UL (ref 0–0.2)
DIFFERENTIAL TYPE: ABNORMAL
EOSINOPHILS RELATIVE PERCENT: 2 % (ref 1–4)
HCT VFR BLD CALC: 37.3 % (ref 40.7–50.3)
HEMOGLOBIN: 12.5 G/DL (ref 13–17)
IMMATURE GRANULOCYTES: 0 %
LYMPHOCYTES # BLD: 22 % (ref 24–43)
MCH RBC QN AUTO: 42.8 PG (ref 25.2–33.5)
MCHC RBC AUTO-ENTMCNC: 33.5 G/DL (ref 28.4–34.8)
MCV RBC AUTO: 127.7 FL (ref 82.6–102.9)
MONOCYTES # BLD: 8 % (ref 3–12)
MORPHOLOGY: ABNORMAL
NRBC AUTOMATED: 0 PER 100 WBC
PDW BLD-RTO: 12.3 % (ref 11.8–14.4)
PLATELET # BLD: 392 K/UL (ref 138–453)
PLATELET ESTIMATE: ABNORMAL
PMV BLD AUTO: 8.9 FL (ref 8.1–13.5)
RBC # BLD: 2.92 M/UL (ref 4.21–5.77)
RBC # BLD: ABNORMAL 10*6/UL
SEG NEUTROPHILS: 66 % (ref 36–65)
SEGMENTED NEUTROPHILS ABSOLUTE COUNT: 5.09 K/UL (ref 1.5–8.1)
WBC # BLD: 7.7 K/UL (ref 3.5–11.3)
WBC # BLD: ABNORMAL 10*3/UL

## 2019-06-12 PROCEDURE — G8427 DOCREV CUR MEDS BY ELIG CLIN: HCPCS | Performed by: INTERNAL MEDICINE

## 2019-06-12 PROCEDURE — G8598 ASA/ANTIPLAT THER USED: HCPCS | Performed by: INTERNAL MEDICINE

## 2019-06-12 PROCEDURE — 1036F TOBACCO NON-USER: CPT | Performed by: INTERNAL MEDICINE

## 2019-06-12 PROCEDURE — G8419 CALC BMI OUT NRM PARAM NOF/U: HCPCS | Performed by: INTERNAL MEDICINE

## 2019-06-12 PROCEDURE — 85025 COMPLETE CBC W/AUTO DIFF WBC: CPT

## 2019-06-12 PROCEDURE — 99211 OFF/OP EST MAY X REQ PHY/QHP: CPT | Performed by: INTERNAL MEDICINE

## 2019-06-12 PROCEDURE — 99214 OFFICE O/P EST MOD 30 MIN: CPT | Performed by: INTERNAL MEDICINE

## 2019-06-12 PROCEDURE — 36415 COLL VENOUS BLD VENIPUNCTURE: CPT

## 2019-06-12 RX ORDER — HYDROXYUREA 500 MG/1
500 CAPSULE ORAL DAILY
Qty: 60 CAPSULE | Refills: 2 | Status: SHIPPED | OUTPATIENT
Start: 2019-06-12 | End: 2019-10-01 | Stop reason: SDUPTHER

## 2019-06-12 NOTE — TELEPHONE ENCOUNTER
SHANTELL ARRIVES AMBULATORY FOR MD VISIT  DR Truett Cooks IN TO SEE PATIENT  ORDERS RECEIVED  SCRIPT SENT TO PATIENT'S PHARMACY  RV 3-4 MONTHS W/ LABS  LABS CDP 10/1/19  MD VISIT 10/1/19 @ 1:20PM  AVS PRINTED AND GIVEN TO PATIENT W/ INSTRUCTIONS  PATIENT DISCHARGED AMBULATORY

## 2019-06-12 NOTE — PROGRESS NOTES
_      Chief Complaint   Patient presents with    Follow-up     Review status of disease     Pain     left side of abdomen / pokey feeling pain/ since December 2018    Pain     left side of neck / sore /stiff / started a couple days     Discuss Labs     DIAGNOSIS:       Chronic persistent leukocytosis  Chronic persistent thrombocytosis  Positive JAK2 gene mutation. Myeloproliferative disorder. Essential thrombocythemia. Chronic normocytic anemia  Peripheral vascular disease  Chronic tobacco abuse  Chronic marijuana abuse  Iron deficiency. Peripheral vascular disease. CURRENT THERAPY:         Hydrea  ASA    BRIEF CASE HISTORY:  The patient is a 51 y.o.  male who is admitted to the hospital for elective aortobifemoral bypass surgery. For the essentially patient had problems with increasing shortness of breath and hypoxia. Pulmonary has been consulted. Patient was noted to have  Persistent elevation of white blood cells and platelets. The patient states that  He was told about this problem back in November 2017 in [place]. Obviously no further workup was done. Repeated labs recently showed elevation of white blood cells and platelets. In addition he had normocytic anemia. Patient denies any fever or night sweats. No weight loss or decreased appetite. No palpable lymph nodes. Patient quit smoking 7 months ago. He was a heavy smoker 35 years. He uses marijuana. Denies alcohol drinking. INTERIM HISTORY:         The patient is seen for follow up thrombocytosis. BM test showed essential thrombocytosis. Started on hydroxyurea. Tolerated well. No side effects. Clinically stable. No chest pain. No headache. No pain in the lower extremities. No nausea or vomiting. No fever. No bleeding.        Past Medical History:   has a past medical history of Bandemia, CAD (coronary artery disease), Cerebral artery occlusion with cerebral infarction [facility]), COPD (chronic obstructive pulmonary disease) ([facility] [address] Fracture of metatarsal bone, Gangrene ([facility] [address] Hypertension, Kidney disease, chronic, stage III (GFR 30-59 ml/min) ([facility]), Moderate malnutrition ([facility]), MRSA (methicillin resistant staph aureus) culture positive, Pain, lower extremity, Peripheral vascular disease ([facility] [address] and Thrombocytosis ([facility] [address] Past Surgical History:   has a past surgical history that includes Coronary angioplasty with stent; other surgical history (10/26/2018); other surgical history; Tonsillectomy; Toe amputation (Right); Aorto-femoral Bypass Graft (12/13/2018); and aortofemoral-popliteal bypass graft (N/A, 12/13/2018). Family History: family history includes Diabetes in his mother; Heart Attack in his father, maternal grandmother, maternal uncle, and paternal grandfather. Social History:   reports that he quit smoking about 12 months ago. His smoking use included cigars. He has a 35.00 pack-year smoking history. He has never used smokeless tobacco. He reports that he has current or past drug history. Drug: Marijuana. He reports that he does not drink alcohol. Medications:    Prior to Admission medications    Medication Sig Start Date End Date Taking?  Authorizing Provider   hydroxyurea (HYDREA) 500 MG chemo capsule Take 1 capsule by mouth daily 6/12/19  Yes Stephan Conn MD   atorvastatin (LIPITOR) 40 MG tablet TAKE 1 TABLET BY MOUTH EVERY DAY 5/9/19  Yes Christina Giordano MD   metoprolol tartrate (LOPRESSOR) 25 MG tablet TAKE 1 TABLET BY MOUTH TWICE A DAY 4/22/19  Yes Christina Giordano MD   montelukast (SINGULAIR) 10 MG tablet Take 1 tablet by mouth nightly 4/22/19  Yes Christina Giordano MD   traZODone (DESYREL) 50 MG tablet TAKE 1 TABLET BY MOUTH EVERY DAY AT NIGHT 3/27/19  Yes Christina Giordano MD   amLODIPine (NORVASC) 10 MG tablet TAKE 1 TABLET BY MOUTH EVERY DAY 3/27/19  Yes Christina Giordano MD   clopidogrel (PLAVIX) 75 MG tablet TAKE 1 TABLET BY MOUTH EVERY DAY 2/28/19  Yes Lily Becerra MD   COMBIVENT RESPIMAT  MCG/ACT AERS inhaler Inhale 1 puff into the lungs every 6 hours 2/6/19  Yes Lily Becerra MD   albuterol sulfate HFA (VENTOLIN HFA) 108 (90 Base) MCG/ACT inhaler Inhale 2 puffs into the lungs every 6 hours as needed for Wheezing 2/6/19  Yes Lily Becerra MD   hydroxyurea (HYDREA) 500 MG chemo capsule Take 1 capsule by mouth 2 times daily 1/8/19  Yes Shilpa German MD   ipratropium-albuterol (DUONEB) 0.5-2.5 (3) MG/3ML SOLN nebulizer solution Take 3 mLs by nebulization every 4 hours as needed for Shortness of Breath 12/19/18  Yes Francesca Knapp MD   ferrous sulfate 325 (65 Fe) MG EC tablet Take 1 tablet by mouth 2 times daily 12/19/18  Yes Francesca Knapp MD   mometasone-formoterol [facility]) 200-5 MCG/ACT inhaler Inhale 2 puffs into the lungs every 12 hours 9/14/18  Yes Lily Becerra MD   sodium chloride (OCEAN NASAL SPRAY) 0.65 % nasal spray 1 spray by Nasal route as needed for Congestion 8/8/18  Yes Kristal Stinson MD   aspirin 81 MG tablet Take 81 mg by mouth daily   Yes Historical MD Zuleyma     Current Outpatient Medications   Medication Sig Dispense Refill    hydroxyurea (HYDREA) 500 MG chemo capsule Take 1 capsule by mouth daily 60 capsule 2    atorvastatin (LIPITOR) 40 MG tablet TAKE 1 TABLET BY MOUTH EVERY DAY 90 tablet 1    metoprolol tartrate (LOPRESSOR) 25 MG tablet TAKE 1 TABLET BY MOUTH TWICE A  tablet 1    montelukast (SINGULAIR) 10 MG tablet Take 1 tablet by mouth nightly 30 tablet 3    traZODone (DESYREL) 50 MG tablet TAKE 1 TABLET BY MOUTH EVERY DAY AT NIGHT 90 tablet 2    amLODIPine (NORVASC) 10 MG tablet TAKE 1 TABLET BY MOUTH EVERY DAY 90 tablet 2    clopidogrel (PLAVIX) 75 MG tablet TAKE 1 TABLET BY MOUTH EVERY DAY 90 tablet 1    COMBIVENT RESPIMAT  MCG/ACT AERS inhaler Inhale 1 puff into the lungs every 6 hours 1 Inhaler 3    albuterol sulfate HFA (VENTOLIN HFA) 108 (90 Base) MCG/ACT inhaler Inhale 2 puffs into the lungs every 6 hours as needed for Wheezing 1 Inhaler 5    hydroxyurea (HYDREA) 500 MG chemo capsule Take 1 capsule by mouth 2 times daily 60 capsule 3    ipratropium-albuterol (DUONEB) 0.5-2.5 (3) MG/3ML SOLN nebulizer solution Take 3 mLs by nebulization every 4 hours as needed for Shortness of Breath 200 mL 1    ferrous sulfate 325 (65 Fe) MG EC tablet Take 1 tablet by mouth 2 times daily 180 tablet 1    mometasone-formoterol (DULERA) 200-5 MCG/ACT inhaler Inhale 2 puffs into the lungs every 12 hours 3 Inhaler 5    sodium chloride (OCEAN NASAL SPRAY) 0.65 % nasal spray 1 spray by Nasal route as needed for Congestion 1 Bottle 3    aspirin 81 MG tablet Take 81 mg by mouth daily       Current Facility-Administered Medications   Medication Dose Route Frequency Provider Last Rate Last Dose    lidocaine (LMX) 4 % cream   Topical PRN Clara Chauhan DPM           Allergies:  Patient has no known allergies. REVIEW OF SYSTEMS:      · General: No weakness or fatigue. No unanticipated weight loss or decreased appetite. No fever or chills. · Eyes: No blurred vision, eye pain or double vision. · Ears: No hearing problems or drainage. No tinnitus. · Throat: No sore throat, problems with swallowing or dysphagia. · Respiratory: No cough, sputum or hemoptysis. Positive for shortness of breath. No pleuritic chest pain. · Cardiovascular: No chest pain, orthopnea or PND. No lower extremity edema. No palpitation. · Gastrointestinal: No problems with swallowing. No abdominal pain or bloating. No nausea or vomiting. No diarrhea or constipation. No GI bleeding. · Genitourinary: No dysuria, hematuria, frequency or urgency. · Musculoskeletal: No muscle aches or pains. No limitation of movement. No back pain. No gait disturbance, No joint complaints. · Dermatologic: No skin rashes or pruritus. No skin lesions or discolorations.    · Psychiatric: No depression, anxiety, or stress or signs of schizophrenia. No change in mood or affect. · Hematologic: No history of bleeding tendency. No bruises or ecchymosis. No history of clotting problems. · Infectious disease: No fever, chills or frequent infections. · Endocrine: No problems with obesity. No polydipsia or polyuria. No temperature intolerance. · Neurologic: No headaches or dizziness. No weakness or numbness of the extremities. No changes in balance, coordination,  memory, mentation, behavior. · Allergic/Immunologic: No nasal congestion or hives. No repeated infections. PHYSICAL EXAM:      /78   Pulse 56   Temp 98.8 °F (37.1 °C)   Wt 140 lb (63.5 kg) Comment: patient documented  BMI 17.97 kg/m²    Temp (24hrs), Av.9 °F (36.6 °C), Min:97.3 °F (36.3 °C), Max:98.6 °F (37 °C)      General appearance - well appearing, not in pain or distress  Mental status - good mood, alert and oriented  Eyes - pupils equal and reactive, extraocular eye movements intact  Ears - bilateral TM's and external ear canals normal  Nose - normal and patent, no erythema, discharge or polyps  Mouth - mucous membranes moist, pharynx normal without lesions  Neck - supple, no significant adenopathy  Lymphatics - no palpable lymphadenopathy, no hepatosplenomegaly  Chest - clear to auscultation, no wheezes, rales or rhonchi, symmetric air entry  Heart - normal rate, regular rhythm, normal S1, S2, no murmurs, rubs, clicks or gallops  Abdomen - soft, nontender, nondistended, no masses or organomegaly  Neurological - alert, oriented, normal speech, no focal findings or movement disorder noted  Musculoskeletal - no joint tenderness, deformity or swelling.   Status post recent vascular surgery as above  Extremities - peripheral pulses normal, no pedal edema, no clubbing or cyanosis  Skin - normal coloration and turgor, no rashes, no suspicious skin lesions noted           DATA:      Labs:     Lab Results   Component Value Date    WBC 7.7 2019

## 2019-06-26 RX ORDER — LANOLIN ALCOHOL/MO/W.PET/CERES
325 CREAM (GRAM) TOPICAL 2 TIMES DAILY
Qty: 180 TABLET | Refills: 1 | Status: SHIPPED | OUTPATIENT
Start: 2019-06-26 | End: 2019-09-20 | Stop reason: SDUPTHER

## 2019-06-26 NOTE — TELEPHONE ENCOUNTER
E-scribing request for IRon tabs. Pt has future appt.      Pt is on wait list.     Health Maintenance   Topic Date Due    HIV screen  08/10/1984    Flu vaccine (Season Ended) 09/01/2019    Potassium monitoring  12/18/2019    Creatinine monitoring  12/18/2019    Lipid screen  05/02/2023    DTaP/Tdap/Td vaccine (2 - Td) 05/09/2028    Pneumococcal 0-64 years Vaccine  Completed             (applicable per patient's age: Cancer Screenings, Depression Screening, Fall Risk Screening, Immunizations)    Hemoglobin A1C (%)   Date Value   08/08/2018 4.9   05/02/2018 4.8     LDL Cholesterol (mg/dL)   Date Value   05/02/2018 41     AST (U/L)   Date Value   11/27/2018 30     ALT (U/L)   Date Value   11/27/2018 46 (H)     BUN (mg/dL)   Date Value   12/18/2018 19      (goal A1C is < 7)   (goal LDL is <100) need 30-50% reduction from baseline     BP Readings from Last 3 Encounters:   06/12/19 130/78   04/02/19 120/87   02/06/19 126/84    (goal /80)      All Future Testing planned in [facility]:  Lab Frequency Next Occurrence   HIV Screen Once 60/61/8526   Basic Metabolic Panel Once 56/33/5226   CBC Auto Differential         Next Visit Date:  Future Appointments   Date Time Provider Ezequiel Booker   10/1/2019  1:20 PM Kelly Sampson MD [facility]            Patient Active Problem List:     Essential hypertension     PVD (peripheral vascular disease) ([facility] [address]     Stage 3 chronic kidney disease ([facility] [address]     Closed nondisplaced fracture of third metatarsal bone of left foot     Ischemia of toe     Critical lower limb ischemia     Gangrene of toe of left foot (HCC)     Bandemia     Thrombocytosis (HCC)     Moderate malnutrition (HCC)     Pain of lower extremity     Tobacco abuse     Ischemia of right lower extremity     COPD (chronic obstructive pulmonary disease) (HCC)     Peripheral vascular disease (HCC)     Pain, lower extremity     Kidney disease, chronic, stage III (GFR 30-59 ml/min) (HCC)     Hypertension CAD (coronary artery disease)     Marijuana user     Normocytic anemia

## 2019-08-13 RX ORDER — MONTELUKAST SODIUM 10 MG/1
10 TABLET ORAL NIGHTLY
Qty: 30 TABLET | Refills: 3 | Status: SHIPPED | OUTPATIENT
Start: 2019-08-13 | End: 2019-09-20 | Stop reason: SDUPTHER

## 2019-09-16 DIAGNOSIS — I73.9 PVD (PERIPHERAL VASCULAR DISEASE) (HCC): ICD-10-CM

## 2019-09-16 RX ORDER — CLOPIDOGREL BISULFATE 75 MG/1
TABLET ORAL
Qty: 90 TABLET | Refills: 0 | Status: SHIPPED | OUTPATIENT
Start: 2019-09-16 | End: 2019-09-20 | Stop reason: SDUPTHER

## 2019-09-20 ENCOUNTER — OFFICE VISIT (OUTPATIENT)
Dept: INTERNAL MEDICINE | Age: 49
End: 2019-09-20
Payer: COMMERCIAL

## 2019-09-20 VITALS
DIASTOLIC BLOOD PRESSURE: 88 MMHG | SYSTOLIC BLOOD PRESSURE: 127 MMHG | HEART RATE: 65 BPM | BODY MASS INDEX: 17.51 KG/M2 | HEIGHT: 74 IN | WEIGHT: 136.4 LBS

## 2019-09-20 DIAGNOSIS — K43.2 INCISIONAL HERNIA, WITHOUT OBSTRUCTION OR GANGRENE: Primary | ICD-10-CM

## 2019-09-20 DIAGNOSIS — D50.8 OTHER IRON DEFICIENCY ANEMIA: ICD-10-CM

## 2019-09-20 DIAGNOSIS — I73.9 PVD (PERIPHERAL VASCULAR DISEASE) (HCC): ICD-10-CM

## 2019-09-20 DIAGNOSIS — J44.9 CHRONIC OBSTRUCTIVE PULMONARY DISEASE, UNSPECIFIED COPD TYPE (HCC): ICD-10-CM

## 2019-09-20 DIAGNOSIS — G47.00 INSOMNIA, UNSPECIFIED TYPE: ICD-10-CM

## 2019-09-20 DIAGNOSIS — E44.0 MODERATE MALNUTRITION (HCC): ICD-10-CM

## 2019-09-20 DIAGNOSIS — D47.3 ESSENTIAL THROMBOCYTHEMIA (HCC): ICD-10-CM

## 2019-09-20 DIAGNOSIS — Z12.11 COLON CANCER SCREENING: ICD-10-CM

## 2019-09-20 DIAGNOSIS — J34.89 RHINORRHEA: ICD-10-CM

## 2019-09-20 DIAGNOSIS — I10 ESSENTIAL HYPERTENSION: ICD-10-CM

## 2019-09-20 PROCEDURE — 3023F SPIROM DOC REV: CPT | Performed by: STUDENT IN AN ORGANIZED HEALTH CARE EDUCATION/TRAINING PROGRAM

## 2019-09-20 PROCEDURE — 1036F TOBACCO NON-USER: CPT | Performed by: STUDENT IN AN ORGANIZED HEALTH CARE EDUCATION/TRAINING PROGRAM

## 2019-09-20 PROCEDURE — 3017F COLORECTAL CA SCREEN DOC REV: CPT | Performed by: STUDENT IN AN ORGANIZED HEALTH CARE EDUCATION/TRAINING PROGRAM

## 2019-09-20 PROCEDURE — G8418 CALC BMI BLW LOW PARAM F/U: HCPCS | Performed by: STUDENT IN AN ORGANIZED HEALTH CARE EDUCATION/TRAINING PROGRAM

## 2019-09-20 PROCEDURE — G8926 SPIRO NO PERF OR DOC: HCPCS | Performed by: STUDENT IN AN ORGANIZED HEALTH CARE EDUCATION/TRAINING PROGRAM

## 2019-09-20 PROCEDURE — G8598 ASA/ANTIPLAT THER USED: HCPCS | Performed by: STUDENT IN AN ORGANIZED HEALTH CARE EDUCATION/TRAINING PROGRAM

## 2019-09-20 PROCEDURE — G8427 DOCREV CUR MEDS BY ELIG CLIN: HCPCS | Performed by: STUDENT IN AN ORGANIZED HEALTH CARE EDUCATION/TRAINING PROGRAM

## 2019-09-20 PROCEDURE — 99211 OFF/OP EST MAY X REQ PHY/QHP: CPT | Performed by: INTERNAL MEDICINE

## 2019-09-20 PROCEDURE — 99214 OFFICE O/P EST MOD 30 MIN: CPT | Performed by: STUDENT IN AN ORGANIZED HEALTH CARE EDUCATION/TRAINING PROGRAM

## 2019-09-20 RX ORDER — TRAZODONE HYDROCHLORIDE 50 MG/1
TABLET ORAL
Qty: 90 TABLET | Refills: 2 | Status: SHIPPED | OUTPATIENT
Start: 2019-09-20 | End: 2021-11-09

## 2019-09-20 RX ORDER — AMLODIPINE BESYLATE 10 MG/1
TABLET ORAL
Qty: 90 TABLET | Refills: 3 | Status: SHIPPED | OUTPATIENT
Start: 2019-09-20 | End: 2020-04-09 | Stop reason: SDUPTHER

## 2019-09-20 RX ORDER — ECHINACEA PURPUREA EXTRACT 125 MG
1 TABLET ORAL PRN
Qty: 1 BOTTLE | Refills: 3 | Status: SHIPPED | OUTPATIENT
Start: 2019-09-20 | End: 2020-08-18

## 2019-09-20 RX ORDER — MONTELUKAST SODIUM 10 MG/1
10 TABLET ORAL NIGHTLY
Qty: 30 TABLET | Refills: 3 | Status: SHIPPED | OUTPATIENT
Start: 2019-09-20 | End: 2021-04-05

## 2019-09-20 RX ORDER — ALBUTEROL SULFATE 90 UG/1
2 AEROSOL, METERED RESPIRATORY (INHALATION) EVERY 6 HOURS PRN
Qty: 1 INHALER | Refills: 5 | Status: SHIPPED | OUTPATIENT
Start: 2019-09-20

## 2019-09-20 RX ORDER — ATORVASTATIN CALCIUM 40 MG/1
TABLET, FILM COATED ORAL
Qty: 90 TABLET | Refills: 3 | Status: SHIPPED | OUTPATIENT
Start: 2019-09-20 | End: 2020-04-09 | Stop reason: SDUPTHER

## 2019-09-20 RX ORDER — LANOLIN ALCOHOL/MO/W.PET/CERES
325 CREAM (GRAM) TOPICAL 2 TIMES DAILY
Qty: 180 TABLET | Refills: 1 | Status: SHIPPED | OUTPATIENT
Start: 2019-09-20 | End: 2020-05-14

## 2019-09-20 RX ORDER — CLOPIDOGREL BISULFATE 75 MG/1
TABLET ORAL
Qty: 90 TABLET | Refills: 3 | Status: SHIPPED | OUTPATIENT
Start: 2019-09-20 | End: 2020-12-27

## 2019-09-20 RX ORDER — IPRATROPIUM/ALBUTEROL SULFATE 20-100 MCG
1 MIST INHALER (GRAM) INHALATION EVERY 6 HOURS
Qty: 1 INHALER | Refills: 3 | Status: SHIPPED | OUTPATIENT
Start: 2019-09-20 | End: 2020-02-11

## 2019-09-20 RX ORDER — HYDROXYUREA 500 MG/1
500 CAPSULE ORAL 2 TIMES DAILY
Qty: 60 CAPSULE | Refills: 3 | Status: CANCELLED | OUTPATIENT
Start: 2019-09-20

## 2019-09-20 RX ORDER — IPRATROPIUM BROMIDE AND ALBUTEROL SULFATE 2.5; .5 MG/3ML; MG/3ML
1 SOLUTION RESPIRATORY (INHALATION) EVERY 4 HOURS PRN
Qty: 200 ML | Refills: 1 | Status: SHIPPED | OUTPATIENT
Start: 2019-09-20 | End: 2020-12-08

## 2019-09-20 NOTE — PROGRESS NOTES
Active Problem List   Diagnosis    Essential hypertension    [facility]VD [facility]peripheral [facility] disease) [facility]    Stage 3 chronic kidney disease ([facility] [address]    Closed nondisplaced fracture of third metatarsal bone of left foot    Ischemia of toe    Critical lower limb ischemia    [facility]    Bandemia    Thrombocytosis ([facility]    Moderate malnutrition ([facility]    Pain of lower extremity    Tobacco abuse    Ischemia of right lower extremity    COPD (chronic obstructive pulmonary disease) ([facility]    Peripheral vascular disease ([facility]    Pain, lower extremity    Kidney disease, chronic, stage III (GFR 30-59 ml/min) ([facility]    Hypertension    CAD (coronary artery disease)    Marijuana user    Normocytic anemia       ALLERGIES    No Known Allergies      MEDICATIONS:      Current Outpatient Medications on File Prior to Visit   Medication Sig Dispense Refill    clopidogrel (PLAVIX) 75 MG tablet TAKE 1 TABLET BY MOUTH EVERY DAY 90 tablet 0    montelukast (SINGULAIR) 10 MG tablet Take 1 tablet by mouth nightly 30 tablet 3    ferrous sulfate 325 (65 Fe) MG EC tablet Take 1 tablet by mouth 2 times daily 180 tablet 1    atorvastatin (LIPITOR) 40 MG tablet TAKE 1 TABLET BY MOUTH EVERY DAY 90 tablet 1    metoprolol tartrate (LOPRESSOR) 25 MG tablet TAKE 1 TABLET BY MOUTH TWICE A  tablet 1    traZODone (DESYREL) 50 MG tablet TAKE 1 TABLET BY MOUTH EVERY DAY AT NIGHT 90 tablet 2    amLODIPine (NORVASC) 10 MG tablet TAKE 1 TABLET BY MOUTH EVERY DAY 90 tablet 2    COMBIVENT RESPIMAT  MCG/ACT AERS inhaler Inhale 1 puff into the lungs every 6 hours 1 Inhaler 3    albuterol sulfate HFA (VENTOLIN HFA) 108 (90 Base) MCG/ACT inhaler Inhale 2 puffs into the lungs every 6 hours as needed for Wheezing 1 Inhaler 5    hydroxyurea (HYDREA) 500 MG chemo capsule Take 1 capsule by mouth 2 times daily 60 capsule 3    ipratropium-albuterol (DUONEB) 0.5-2.5 (3) MG/3ML SOLN nebulizer solution Take 3 mLs by Inhale 1 puff into the lungs every 6 hours  Dispense: 1 Inhaler; Refill: 3  - albuterol sulfate HFA (VENTOLIN HFA) 108 (90 Base) MCG/ACT inhaler; Inhale 2 puffs into the lungs every 6 hours as needed for Wheezing  Dispense: 1 Inhaler; Refill: 5    4. Essential hypertension    - metoprolol tartrate (LOPRESSOR) 25 MG tablet; TAKE 1 TABLET BY MOUTH TWICE A DAY  Dispense: 180 tablet; Refill: 1  - amLODIPine (NORVASC) 10 MG tablet; TAKE 1 TABLET BY MOUTH EVERY DAY  Dispense: 90 tablet; Refill: 3    5. Insomnia, unspecified type    - traZODone (DESYREL) 50 MG tablet; TAKE 1 TABLET BY MOUTH EVERY DAY AT NIGHT  Dispense: 90 tablet; Refill: 2    6. Essential thrombocythemia ([address]    Follow with hem onc    7. PVD (peripheral vascular disease) (HCC)    - clopidogrel (PLAVIX) 75 MG tablet; TAKE 1 TABLET BY MOUTH EVERY DAY  Dispense: 90 tablet; Refill: 3  - atorvastatin (LIPITOR) 40 MG tablet; TAKE 1 TABLET BY MOUTH EVERY DAY  Dispense: 90 tablet; Refill: 3  - aspirin 81 MG tablet; Take 1 tablet by mouth daily  Dispense: 30 tablet; Refill: 5    8. Colon cancer screening    - [facility] Screening Colonoscopy    9. Other iron deficiency anemia    - ferrous sulfate 325 (65 Fe) MG EC tablet; Take 1 tablet by mouth 2 times daily  Dispense: 180 tablet; Refill: 1      FOLLOW UP:       1. Patricia Cordoba received counseling on the following healthy behaviors: nutrition, exercise, medication adherence and tobacco cessation  2. Patient given educational materials - see patient instructions  3. Discussed use, benefit, and side effects of prescribed medications. Barriers to medication compliance addressed. All patient questions answered. Pt voiced understanding. 4.  Reviewed prior labs and health maintenance  5. Continue current medications, diet and exercise.     Max Quiroz MD  INTERNAL MEDICINE RESIDENT, PGY-2  [address] [place], [place]  9/20/2019,10:46 AM   Attending Physician Statement  I have discussed the care of

## 2019-09-20 NOTE — PATIENT INSTRUCTIONS
Follow-up appointment scheduled for 12/20/19 at 1:00p , AVS given to patient. Medications e-scribe to pharmacy of pt's choice. Order for Colonoscopy, Outpatient Nutrition Service, Vascular surgery faxed to [address] [place] [address] they will all pt for appt. Please call [phone] in not heard within 2 weeks.        jw

## 2019-09-30 ENCOUNTER — TELEPHONE (OUTPATIENT)
Dept: GASTROENTEROLOGY | Age: 49
End: 2019-09-30

## 2019-10-01 ENCOUNTER — HOSPITAL ENCOUNTER (OUTPATIENT)
Facility: MEDICAL CENTER | Age: 49
Discharge: HOME | End: 2019-10-01
Payer: COMMERCIAL

## 2019-10-01 ENCOUNTER — TELEPHONE (OUTPATIENT)
Dept: ONCOLOGY | Age: 49
End: 2019-10-01

## 2019-10-01 ENCOUNTER — OFFICE VISIT (OUTPATIENT)
Dept: ONCOLOGY | Age: 49
End: 2019-10-01
Payer: COMMERCIAL

## 2019-10-01 VITALS
DIASTOLIC BLOOD PRESSURE: 80 MMHG | HEART RATE: 66 BPM | BODY MASS INDEX: 17 KG/M2 | SYSTOLIC BLOOD PRESSURE: 128 MMHG | WEIGHT: 132.4 LBS | TEMPERATURE: 97.8 F | RESPIRATION RATE: 17 BRPM

## 2019-10-01 DIAGNOSIS — D47.1 MYELOPROLIFERATIVE DISORDER (HCC): ICD-10-CM

## 2019-10-01 DIAGNOSIS — D72.825 BANDEMIA: Primary | ICD-10-CM

## 2019-10-01 DIAGNOSIS — D72.825 BANDEMIA: ICD-10-CM

## 2019-10-01 DIAGNOSIS — D47.3 ESSENTIAL THROMBOCYTHEMIA (HCC): Primary | ICD-10-CM

## 2019-10-01 DIAGNOSIS — I73.9 PERIPHERAL VASCULAR DISEASE (HCC): ICD-10-CM

## 2019-10-01 LAB
ABSOLUTE EOS #: 0.28 K/UL (ref 0–0.44)
ABSOLUTE IMMATURE GRANULOCYTE: 0 K/UL (ref 0–0.3)
ABSOLUTE LYMPH #: 2.05 K/UL (ref 1.1–3.7)
ABSOLUTE MONO #: 0.93 K/UL (ref 0.1–1.2)
BASOPHILS # BLD: 2 % (ref 0–2)
BASOPHILS ABSOLUTE: 0.19 K/UL (ref 0–0.2)
DIFFERENTIAL TYPE: ABNORMAL
EOSINOPHILS RELATIVE PERCENT: 3 % (ref 1–4)
HCT VFR BLD CALC: 40.7 % (ref 40.7–50.3)
HEMOGLOBIN: 13.4 G/DL (ref 13–17)
IMMATURE GRANULOCYTES: 0 %
LYMPHOCYTES # BLD: 22 % (ref 24–43)
MCH RBC QN AUTO: 39.2 PG (ref 25.2–33.5)
MCHC RBC AUTO-ENTMCNC: 32.9 G/DL (ref 28.4–34.8)
MCV RBC AUTO: 119 FL (ref 82.6–102.9)
MONOCYTES # BLD: 10 % (ref 3–12)
MORPHOLOGY: ABNORMAL
NRBC AUTOMATED: 0 PER 100 WBC
PDW BLD-RTO: 13.2 % (ref 11.8–14.4)
PLATELET # BLD: 690 K/UL (ref 138–453)
PLATELET ESTIMATE: ABNORMAL
PMV BLD AUTO: 9.1 FL (ref 8.1–13.5)
RBC # BLD: 3.42 M/UL (ref 4.21–5.77)
RBC # BLD: ABNORMAL 10*6/UL
SEG NEUTROPHILS: 63 % (ref 36–65)
SEGMENTED NEUTROPHILS ABSOLUTE COUNT: 5.85 K/UL (ref 1.5–8.1)
WBC # BLD: 9.3 K/UL (ref 3.5–11.3)
WBC # BLD: ABNORMAL 10*3/UL

## 2019-10-01 PROCEDURE — G8484 FLU IMMUNIZE NO ADMIN: HCPCS | Performed by: INTERNAL MEDICINE

## 2019-10-01 PROCEDURE — G8418 CALC BMI BLW LOW PARAM F/U: HCPCS | Performed by: INTERNAL MEDICINE

## 2019-10-01 PROCEDURE — G8427 DOCREV CUR MEDS BY ELIG CLIN: HCPCS | Performed by: INTERNAL MEDICINE

## 2019-10-01 PROCEDURE — 36415 COLL VENOUS BLD VENIPUNCTURE: CPT

## 2019-10-01 PROCEDURE — 1036F TOBACCO NON-USER: CPT | Performed by: INTERNAL MEDICINE

## 2019-10-01 PROCEDURE — 3017F COLORECTAL CA SCREEN DOC REV: CPT | Performed by: INTERNAL MEDICINE

## 2019-10-01 PROCEDURE — G8598 ASA/ANTIPLAT THER USED: HCPCS | Performed by: INTERNAL MEDICINE

## 2019-10-01 PROCEDURE — 85025 COMPLETE CBC W/AUTO DIFF WBC: CPT

## 2019-10-01 PROCEDURE — 99214 OFFICE O/P EST MOD 30 MIN: CPT | Performed by: INTERNAL MEDICINE

## 2019-10-01 PROCEDURE — 99211 OFF/OP EST MAY X REQ PHY/QHP: CPT | Performed by: INTERNAL MEDICINE

## 2019-10-01 RX ORDER — HYDROXYUREA 500 MG/1
500 CAPSULE ORAL DAILY
Qty: 100 CAPSULE | Refills: 3 | Status: SHIPPED | OUTPATIENT
Start: 2019-10-01 | End: 2020-08-18 | Stop reason: SDUPTHER

## 2019-10-21 ENCOUNTER — TELEPHONE (OUTPATIENT)
Dept: GASTROENTEROLOGY | Age: 49
End: 2019-10-21

## 2020-01-03 ENCOUNTER — HOSPITAL ENCOUNTER (OUTPATIENT)
Age: 50
Setting detail: SPECIMEN
Discharge: HOME | End: 2020-01-03
Payer: COMMERCIAL

## 2020-01-03 ENCOUNTER — OFFICE VISIT (OUTPATIENT)
Dept: INTERNAL MEDICINE | Age: 50
End: 2020-01-03
Payer: COMMERCIAL

## 2020-01-03 ENCOUNTER — TELEPHONE (OUTPATIENT)
Dept: GASTROENTEROLOGY | Age: 50
End: 2020-01-03

## 2020-01-03 VITALS
HEIGHT: 73 IN | WEIGHT: 130 LBS | DIASTOLIC BLOOD PRESSURE: 87 MMHG | SYSTOLIC BLOOD PRESSURE: 131 MMHG | BODY MASS INDEX: 17.23 KG/M2 | HEART RATE: 70 BPM

## 2020-01-03 LAB
BILIRUBIN URINE: NEGATIVE
BILIRUBIN, POC: NORMAL
BLOOD URINE, POC: NORMAL
CLARITY, POC: CLEAR
COLOR, POC: YELLOW
COLOR: YELLOW
COMMENT UA: NORMAL
GLUCOSE URINE, POC: NORMAL
GLUCOSE URINE: NEGATIVE
KETONES, POC: NORMAL
KETONES, URINE: NEGATIVE
LEUKOCYTE EST, POC: NORMAL
LEUKOCYTE ESTERASE, URINE: NEGATIVE
NITRITE, POC: NORMAL
NITRITE, URINE: NEGATIVE
PH UA: 7 (ref 5–8)
PH, POC: 7
PROTEIN UA: NEGATIVE
PROTEIN, POC: NORMAL
SPECIFIC GRAVITY UA: 1 (ref 1–1.03)
SPECIFIC GRAVITY, POC: 1.01
TURBIDITY: CLEAR
URINE HGB: NEGATIVE
UROBILINOGEN, POC: 0.2
UROBILINOGEN, URINE: NORMAL

## 2020-01-03 PROCEDURE — 99211 OFF/OP EST MAY X REQ PHY/QHP: CPT | Performed by: INTERNAL MEDICINE

## 2020-01-03 PROCEDURE — G8484 FLU IMMUNIZE NO ADMIN: HCPCS | Performed by: STUDENT IN AN ORGANIZED HEALTH CARE EDUCATION/TRAINING PROGRAM

## 2020-01-03 PROCEDURE — G8418 CALC BMI BLW LOW PARAM F/U: HCPCS | Performed by: STUDENT IN AN ORGANIZED HEALTH CARE EDUCATION/TRAINING PROGRAM

## 2020-01-03 PROCEDURE — G8926 SPIRO NO PERF OR DOC: HCPCS | Performed by: STUDENT IN AN ORGANIZED HEALTH CARE EDUCATION/TRAINING PROGRAM

## 2020-01-03 PROCEDURE — 4004F PT TOBACCO SCREEN RCVD TLK: CPT | Performed by: STUDENT IN AN ORGANIZED HEALTH CARE EDUCATION/TRAINING PROGRAM

## 2020-01-03 PROCEDURE — 3023F SPIROM DOC REV: CPT | Performed by: STUDENT IN AN ORGANIZED HEALTH CARE EDUCATION/TRAINING PROGRAM

## 2020-01-03 PROCEDURE — 99214 OFFICE O/P EST MOD 30 MIN: CPT | Performed by: STUDENT IN AN ORGANIZED HEALTH CARE EDUCATION/TRAINING PROGRAM

## 2020-01-03 PROCEDURE — G8427 DOCREV CUR MEDS BY ELIG CLIN: HCPCS | Performed by: STUDENT IN AN ORGANIZED HEALTH CARE EDUCATION/TRAINING PROGRAM

## 2020-01-03 PROCEDURE — 3017F COLORECTAL CA SCREEN DOC REV: CPT | Performed by: STUDENT IN AN ORGANIZED HEALTH CARE EDUCATION/TRAINING PROGRAM

## 2020-01-03 PROCEDURE — 81002 URINALYSIS NONAUTO W/O SCOPE: CPT | Performed by: STUDENT IN AN ORGANIZED HEALTH CARE EDUCATION/TRAINING PROGRAM

## 2020-01-03 RX ORDER — VARENICLINE TARTRATE 25 MG
KIT ORAL
Qty: 1 BOX | Refills: 0 | Status: SHIPPED | OUTPATIENT
Start: 2020-01-03 | End: 2020-02-11

## 2020-01-03 ASSESSMENT — PATIENT HEALTH QUESTIONNAIRE - PHQ9
2. FEELING DOWN, DEPRESSED OR HOPELESS: 0
SUM OF ALL RESPONSES TO PHQ9 QUESTIONS 1 & 2: 0
1. LITTLE INTEREST OR PLEASURE IN DOING THINGS: 0
SUM OF ALL RESPONSES TO PHQ QUESTIONS 1-9: 0
SUM OF ALL RESPONSES TO PHQ QUESTIONS 1-9: 0

## 2020-01-03 NOTE — PROGRESS NOTES
[facility] [address] [facility]  [address] [facility] [address]  Dept: [phone]  Dept Fax: [phone]    Office Progress/Follow Up Note  Date of patient's visit: 1/3/2020  Patient's Name:  Zaira Tesfaye YOB: 1970            Patient Care Team:  Jovon Matta MD as PCP - General (Internal Medicine)    REASON FOR VISIT: Routine outpatient follow up    HISTORY OF PRESENT ILLNESS:      Chief Complaint   Patient presents with    Mass     2 lumps in pelvic region, x5 days, blood in urine, no appetite       History was obtained from the patient. Zaira Tesfaye is a 47 y.o. is here for a follow up visit. Patient had reported he had noticed some lumps and bruise in inguinal area for past 5 days. Patient denies pain or pruritus in the area. However on examination today, it appears completely resolved. No bumps are noted, did have single prominent vessel but no other abnormality seen. Patient reports it does look like it has gone back to baseline. Patient additionally reports noticing dark urine for the past year. Patient reports every morning urine appears orange, with then lightened up during the day and is yellow by night. Patient denies any pain in flank or dysuria. Patient is a 35-pack-year smoker, had cut down recently but restarted smoking again. Does report he wishes to quit.       Patient Active Problem List   Diagnosis    Essential hypertension    PVD (peripheral vascular disease) ([address]    Stage 3 chronic kidney disease (HCC)    Closed nondisplaced fracture of third metatarsal bone of left foot    Ischemia of toe    Critical lower limb ischemia    Gangrene of toe of left foot ([address]    Bandemia    Thrombocytosis (HCC)    Moderate malnutrition (HCC)    Pain of lower extremity    Tobacco abuse    Ischemia of right lower extremity    COPD (chronic obstructive pulmonary disease) (HCC)    Peripheral vascular disease (HCC)    Pain, lower extremity    Kidney disease, chronic, stage III (GFR 30-59 ml/min) (HCC)    Hypertension    CAD (coronary artery disease)    Marijuana user    Normocytic anemia         Health Maintenance Due   Topic Date Due    HIV screen  08/10/1984    Lipid screen  05/02/2019    Shingles Vaccine (1 of 2) 08/10/2019    Colon cancer screen colonoscopy  08/10/2019    Flu vaccine (1) 09/01/2019    Potassium monitoring  12/18/2019    Creatinine monitoring  12/18/2019         No Known Allergies      MEDICATIONS:      Current Outpatient Medications   Medication Sig Dispense Refill    hydroxyurea (HYDREA) 500 MG chemo capsule Take 1 capsule by mouth daily Weekdays and twice daily weekends.  100 capsule 3    traZODone (DESYREL) 50 MG tablet TAKE 1 TABLET BY MOUTH EVERY DAY AT NIGHT 90 tablet 2    montelukast (SINGULAIR) 10 MG tablet Take 1 tablet by mouth nightly 30 tablet 3    mometasone-formoterol (DULERA) 200-5 MCG/ACT inhaler Inhale 2 puffs into the lungs every 12 hours 3 Inhaler 5    metoprolol tartrate (LOPRESSOR) 25 MG tablet TAKE 1 TABLET BY MOUTH TWICE A  tablet 1    ipratropium-albuterol (DUONEB) 0.5-2.5 (3) MG/3ML SOLN nebulizer solution Take 3 mLs by nebulization every 4 hours as needed for Shortness of Breath 200 mL 1    ferrous sulfate 325 (65 Fe) MG EC tablet Take 1 tablet by mouth 2 times daily 180 tablet 1    COMBIVENT RESPIMAT  MCG/ACT AERS inhaler Inhale 1 puff into the lungs every 6 hours 1 Inhaler 3    clopidogrel (PLAVIX) 75 MG tablet TAKE 1 TABLET BY MOUTH EVERY DAY 90 tablet 3    atorvastatin (LIPITOR) 40 MG tablet TAKE 1 TABLET BY MOUTH EVERY DAY 90 tablet 3    amLODIPine (NORVASC) 10 MG tablet TAKE 1 TABLET BY MOUTH EVERY DAY 90 tablet 3    albuterol sulfate HFA (VENTOLIN HFA) 108 (90 Base) MCG/ACT inhaler Inhale 2 puffs into the lungs every 6 hours as needed for Wheezing 1 Inhaler 5    aspirin 81 MG tablet Take 1 tablet by mouth daily 30 tablet 5    sodium chloride (OCEAN cessation    · Discussed use, benefit, and side effects of prescribed medications. Barriers to medication compliance addressed. All patient questions answered. Pt voiced understanding.      · Patient given educational materials - see patient instructions    Kalin Florez MD  PGY-2  [facility] [place] [facility]  1/3/2020, 2:22 PM

## 2020-01-03 NOTE — PATIENT INSTRUCTIONS
Return To Clinic 4/3/2020 for 3 month follow up. After Visit Summary given and reviewed. The medication list included in this document is our record of what you are currently taking, including any changes that were made at today's visit. If you find any differences when compared to your medications at home, or have any questions that were not answered at your visit, please contact the office. It is very important for your care that you keep your appointment. If for some reason you are unable to keep your appointment, it is equally important that you call our office at [phone] to cancel your appointment and reschedule. Failure to do so may result in your termination from our practice. LABORATORY INSTRUCTIONS    Your doctor has ordered blood or urine testing. You can get this testing done at the Lab located on the first floor of the [facility], or at any other [facility]. Please stop at Main Registration, before going to the lab, as you must be registered first.     Please get this lab done before your next visit. You may NOT eat, drink, smoke, or chew anything before this test for 8-12 hours. You may still have water. Referral for Gastroenterology sent to [facility]. Specialty office will contact patient for appointment. A copy of referral with contact information and address given to patient.  Patient should contact specialist office if no contact has been made to patient within 1 week.     KAJAL Guerra

## 2020-02-11 RX ORDER — IPRATROPIUM/ALBUTEROL SULFATE 20-100 MCG
1 MIST INHALER (GRAM) INHALATION EVERY 6 HOURS
Qty: 1 INHALER | Refills: 3 | Status: SHIPPED | OUTPATIENT
Start: 2020-02-11 | End: 2020-06-01

## 2020-02-11 RX ORDER — VARENICLINE TARTRATE 25 MG
KIT ORAL
Qty: 1 BOX | Refills: 0 | Status: SHIPPED | OUTPATIENT
Start: 2020-02-11 | End: 2020-03-06

## 2020-02-11 NOTE — TELEPHONE ENCOUNTER
disease) ([facility] [address]     Peripheral vascular disease (HCC)     Pain, lower extremity     Kidney disease, chronic, stage III (GFR 30-59 ml/min) (HCC)     Hypertension     CAD (coronary artery disease)     Marijuana user     Normocytic anemia

## 2020-02-11 NOTE — TELEPHONE ENCOUNTER
Request for Combivent.       Next Visit Date:  Future Appointments   Date Time Provider Ezequiel Tavaresi   2/12/2020  9:30 Tavia Ayers MD [facility]   2/25/2020  2:40 PM Zaid Silva MD [facility]   4/3/2020  1:25 PM Kiran Tapia MD [facility] IM [address] Maintenance   Topic Date Due    HIV screen  08/10/1984    Lipid screen  05/02/2019    Shingles Vaccine (1 of 2) 08/10/2019    Colon cancer screen colonoscopy  08/10/2019    Flu vaccine (1) 09/01/2019    Potassium monitoring  12/18/2019    Creatinine monitoring  12/18/2019    DTaP/Tdap/Td vaccine (2 - Td) 05/09/2028    Pneumococcal 0-64 years Vaccine  Completed    Hepatitis A vaccine  Aged Out    Hepatitis B vaccine  Aged Out    Hib vaccine  Aged Out    Meningococcal (ACWY) vaccine  Aged Out       Hemoglobin A1C (%)   Date Value   08/08/2018 4.9   05/02/2018 4.8             ( goal A1C is < 7)   No results found for: LABMICR  LDL Cholesterol (mg/dL)   Date Value   05/02/2018 41       (goal LDL is <100)   AST (U/L)   Date Value   11/27/2018 30     ALT (U/L)   Date Value   11/27/2018 46 (H)     BUN (mg/dL)   Date Value   12/18/2018 19     BP Readings from Last 3 Encounters:   01/03/20 131/87   10/01/19 128/80   09/20/19 127/88          (goal 120/80)    All Future Testing planned in [facility]  Lab Frequency Next Occurrence   Lipid, Fasting Once 46/78/4803   Basic Metabolic Panel Once 12/99/5242   Iron And TIBC Once 04/13/2020         Patient Active Problem List:     Essential hypertension     PVD (peripheral vascular disease) ([address]     Stage 3 chronic kidney disease ([address]     Closed nondisplaced fracture of third metatarsal bone of left foot     Ischemia of toe     Critical lower limb ischemia     Gangrene of toe of left foot (HCC)     Bandemia     Thrombocytosis (HCC)     Moderate malnutrition (HCC)     Pain of lower extremity     Tobacco abuse     Ischemia of right lower extremity     COPD (chronic obstructive pulmonary

## 2020-02-24 ENCOUNTER — HOSPITAL ENCOUNTER (OUTPATIENT)
Facility: MEDICAL CENTER | Age: 50
End: 2020-02-24
Payer: COMMERCIAL

## 2020-03-06 ENCOUNTER — TELEPHONE (OUTPATIENT)
Dept: INTERNAL MEDICINE | Age: 50
End: 2020-03-06

## 2020-03-06 RX ORDER — VARENICLINE TARTRATE 25 MG
KIT ORAL
Qty: 1 BOX | Refills: 0 | Status: SHIPPED | OUTPATIENT
Start: 2020-03-06 | End: 2020-03-06

## 2020-03-06 RX ORDER — VARENICLINE TARTRATE 1 MG/1
1 TABLET, FILM COATED ORAL 2 TIMES DAILY
Qty: 60 TABLET | Refills: 3 | Status: SHIPPED | OUTPATIENT
Start: 2020-03-06 | End: 2020-12-08 | Stop reason: ALTCHOICE

## 2020-03-06 NOTE — TELEPHONE ENCOUNTER
PC from [facility] need the directions clarified on the Chantix Starter pack. Questioning if it should be a starter pack or a continuing pack. Please resend the correct pack.       Health Maintenance   Topic Date Due    HIV screen  08/10/1984    Lipid screen  05/02/2019    Shingles Vaccine (1 of 2) 08/10/2019    Colon cancer screen colonoscopy  08/10/2019    Flu vaccine (1) 09/01/2019    Potassium monitoring  12/18/2019    Creatinine monitoring  12/18/2019    DTaP/Tdap/Td vaccine (2 - Td) 05/09/2028    Pneumococcal 0-64 years Vaccine  Completed    Hepatitis A vaccine  Aged Out    Hepatitis B vaccine  Aged Out    Hib vaccine  Aged Out    Meningococcal (ACWY) vaccine  Aged Out             (applicable per patient's age: Cancer Screenings, Depression Screening, Fall Risk Screening, Immunizations)    Hemoglobin A1C (%)   Date Value   08/08/2018 4.9   05/02/2018 4.8     LDL Cholesterol (mg/dL)   Date Value   05/02/2018 41     AST (U/L)   Date Value   11/27/2018 30     ALT (U/L)   Date Value   11/27/2018 46 (H)     BUN (mg/dL)   Date Value   12/18/2018 19      (goal A1C is < 7)   (goal LDL is <100) need 30-50% reduction from baseline     BP Readings from Last 3 Encounters:   01/03/20 131/87   10/01/19 128/80   09/20/19 127/88    (goal /80)      All Future Testing planned in [facility]:  Lab Frequency Next Occurrence   Lipid, Fasting Once 72/27/5265   Basic Metabolic Panel Once 59/48/3474   Iron And TIBC Once 04/13/2020   CBC With Auto Differential Once 03/20/2020       Next Visit Date:  Future Appointments   Date Time Provider Ezequiel Booker   3/26/2020  3:35 PM Nette Stephens MD [facility]   4/3/2020  1:25 PM Daniel Goldberg MD [facility] IM [facility]            Patient Active Problem List:     Essential hypertension     PVD (peripheral vascular disease) ([facility] [address]     Stage 3 chronic kidney disease ([facility] [address] [facility]     Closed nondisplaced fracture of third metatarsal bone of left foot     Ischemia of toe Critical lower limb ischemia     Gangrene of toe of left foot (HCC)     Bandemia     Thrombocytosis (HCC)     Moderate malnutrition (HCC)     Pain of lower extremity     Tobacco abuse     Ischemia of right lower extremity     COPD (chronic obstructive pulmonary disease) (HCC)     Peripheral vascular disease (HCC)     Pain, lower extremity     Kidney disease, chronic, stage III (GFR 30-59 ml/min) (HCC)     Hypertension     CAD (coronary artery disease)     Marijuana user     Normocytic anemia

## 2020-03-09 NOTE — TELEPHONE ENCOUNTER
Request for aspirin.       Next Visit Date:  Future Appointments   Date Time Provider Ezequiel Tavaresi   3/26/2020  3:35 PM Simone Soriano MD [facility]   4/3/2020  1:25 PM Ian Perry MD [facility] IM [address] Maintenance   Topic Date Due    HIV screen  08/10/1984    Lipid screen  05/02/2019    Shingles Vaccine (1 of 2) 08/10/2019    Colon cancer screen colonoscopy  08/10/2019    Flu vaccine (1) 09/01/2019    Potassium monitoring  12/18/2019    Creatinine monitoring  12/18/2019    DTaP/Tdap/Td vaccine (2 - Td) 05/09/2028    Pneumococcal 0-64 years Vaccine  Completed    Hepatitis A vaccine  Aged Out    Hepatitis B vaccine  Aged Out    Hib vaccine  Aged Out    Meningococcal (ACWY) vaccine  Aged Out       Hemoglobin A1C (%)   Date Value   08/08/2018 4.9   05/02/2018 4.8             ( goal A1C is < 7)   No results found for: LABMICR  LDL Cholesterol (mg/dL)   Date Value   05/02/2018 41       (goal LDL is <100)   AST (U/L)   Date Value   11/27/2018 30     ALT (U/L)   Date Value   11/27/2018 46 (H)     BUN (mg/dL)   Date Value   12/18/2018 19     BP Readings from Last 3 Encounters:   01/03/20 131/87   10/01/19 128/80   09/20/19 127/88          (goal 120/80)    All Future Testing planned in [facility]  Lab Frequency Next Occurrence   Lipid, Fasting Once 55/62/6075   Basic Metabolic Panel Once 01/67/0468   Iron And TIBC Once 04/13/2020   CBC With Auto Differential Once 03/20/2020         Patient Active Problem List:     Essential hypertension     PVD (peripheral vascular disease) ([address]     Stage 3 chronic kidney disease ([address]     Closed nondisplaced fracture of third metatarsal bone of left foot     Ischemia of toe     Critical lower limb ischemia     Gangrene of toe of left foot (HCC)     Bandemia     Thrombocytosis (HCC)     Moderate malnutrition (HCC)     Pain of lower extremity     Tobacco abuse     Ischemia of right lower extremity     COPD (chronic obstructive pulmonary disease) [facility])     Peripheral vascular disease (HCC)     Pain, lower extremity     Kidney disease, chronic, stage III (GFR 30-59 ml/min) (HCC)     Hypertension     CAD (coronary artery disease)     Marijuana user     Normocytic anemia

## 2020-04-09 ENCOUNTER — VIRTUAL VISIT (OUTPATIENT)
Dept: INTERNAL MEDICINE | Age: 50
End: 2020-04-09
Payer: COMMERCIAL

## 2020-04-09 PROCEDURE — 4004F PT TOBACCO SCREEN RCVD TLK: CPT | Performed by: INTERNAL MEDICINE

## 2020-04-09 PROCEDURE — G8427 DOCREV CUR MEDS BY ELIG CLIN: HCPCS | Performed by: INTERNAL MEDICINE

## 2020-04-09 PROCEDURE — 3017F COLORECTAL CA SCREEN DOC REV: CPT | Performed by: INTERNAL MEDICINE

## 2020-04-09 PROCEDURE — 99213 OFFICE O/P EST LOW 20 MIN: CPT | Performed by: INTERNAL MEDICINE

## 2020-04-09 PROCEDURE — G8418 CALC BMI BLW LOW PARAM F/U: HCPCS | Performed by: INTERNAL MEDICINE

## 2020-04-09 RX ORDER — AMLODIPINE BESYLATE 10 MG/1
TABLET ORAL
Qty: 90 TABLET | Refills: 3 | Status: SHIPPED | OUTPATIENT
Start: 2020-04-09

## 2020-04-09 RX ORDER — ATORVASTATIN CALCIUM 40 MG/1
TABLET, FILM COATED ORAL
Qty: 90 TABLET | Refills: 3 | Status: SHIPPED | OUTPATIENT
Start: 2020-04-09 | End: 2021-05-07

## 2020-04-09 ASSESSMENT — ENCOUNTER SYMPTOMS
EYES NEGATIVE: 1
RESPIRATORY NEGATIVE: 1

## 2020-04-09 NOTE — PROGRESS NOTES
2020    TELEHEALTH EVALUATION -- Audio/Visual (During VQYIM-84 public health emergency)    Patient and physician are located in their individual homes    HPI:    Portia Brand (:  1970) has requested an audio/video evaluation for the following concern(s):    F/U CHRONIC medical problems     1. Essential thrombocythemia complicated with CVA, MI, PAD s/p aorto femoral bypass. He has been on aspirin, plavix, hydroxyurea and atorvastatin   Has mild anemia , black stools and orange urine     2. C/o orange urine : UA ordered in  shows no blood. Last lft were in 2018 ,   Due for colon cancer screen     Review of Systems   Constitutional: Negative. HENT: Negative. Eyes: Negative. Respiratory: Negative. Cardiovascular: Negative. Endocrine: Negative. Genitourinary: Negative. Neurological: Negative. Psychiatric/Behavioral: Negative. Prior to Visit Medications    Medication Sig Taking? Authorizing Provider   amLODIPine (NORVASC) 10 MG tablet TAKE 1 TABLET BY MOUTH EVERY DAY Yes Alla Montgomery MD   atorvastatin (LIPITOR) 40 MG tablet TAKE 1 TABLET BY MOUTH EVERY DAY Yes Alla Montgomery MD   aspirin 81 MG tablet Take 1 tablet by mouth daily Yes Linnea Schwab, MD   varenicline (CHANTIX CONTINUING MONTH DHRUV) 1 MG tablet Take 1 tablet by mouth 2 times daily Yes Linnea Schwab, MD   COMBIVENT RESPIMAT  MCG/ACT AERS inhaler INHALE 1 PUFF INTO THE LUNGS EVERY 6 HOURS Yes Linnea Schwab, MD   hydroxyurea (HYDREA) 500 MG chemo capsule Take 1 capsule by mouth daily Weekdays and twice daily weekends.  Yes Pancho Nicole MD   traZODone (DESYREL) 50 MG tablet TAKE 1 TABLET BY MOUTH EVERY DAY AT NIGHT Yes Bret Esteban MD   montelukast (SINGULAIR) 10 MG tablet Take 1 tablet by mouth nightly Yes Bret Esteban MD   mometasone-formoterol [facility]) 200-5 MCG/ACT inhaler Inhale 2 puffs into the lungs every 12 hours Yes Bret Esteban MD   metoprolol tartrate (LOPRESSOR) 25 MG tablet TAKE 1 TABLET BY MOUTH TWICE A DAY Yes Gloria Sanderson MD   ipratropium-albuterol (DUONEB) 0.5-2.5 (3) MG/3ML SOLN nebulizer solution Take 3 mLs by nebulization every 4 hours as needed for Shortness of Breath Yes Gloria Sanderson MD   ferrous sulfate 325 (65 Fe) MG EC tablet Take 1 tablet by mouth 2 times daily Yes Gloria Sanderson MD   clopidogrel (PLAVIX) 75 MG tablet TAKE 1 TABLET BY MOUTH EVERY DAY Yes Gloria Sanderson MD   albuterol sulfate HFA (VENTOLIN HFA) 108 (90 Base) MCG/ACT inhaler Inhale 2 puffs into the lungs every 6 hours as needed for Wheezing Yes Gloria Sanderson MD   sodium chloride (OCEAN NASAL SPRAY) 0.65 % nasal spray 1 spray by Nasal route as needed for Congestion  Patient not taking: Reported on 1/3/2020  Gloria Sanderson MD       Social History     Tobacco Use    Smoking status: Current Every Day Smoker     Packs/day: 1.00     Years: 37.00     Pack years: 37.00     Types: Cigars     Start date: 1/3/1983    Smokeless tobacco: Never Used   Substance Use Topics    Alcohol use: No    Drug use: Yes     Types: Marijuana     Comment: states a couple times per week              RECORD REVIEW: Previous medical records were reviewed at today's visit. PHYSICAL EXAMINATION:    Vital Signs: (As obtained by patient/caregiver at home)      Constitutional: [x] Appears well-developed and well-nourished [x] No apparent distress      [] Abnormal   Mental status  [x] Alert and awake  [x] Oriented to person/place/time []Able to follow commands      Eyes:  EOM    []  Normal  [] Abnormal-  Sclera  []  Normal  [] Abnormal -         Discharge []  None visible  [] Abnormal -    HENT:   [x] Normocephalic, atraumatic.   [] Abnormal   [] Mouth/Throat: Mucous membranes are moist.     External Ears [] Normal  [] Abnormal-    Neck: [] No visualized mass [] Abnormal-    Pulmonary/Chest: [x] Respiratory effort normal.  [] No visualized signs of difficulty breathing or respiratory distress [] Abnormal      Musculoskeletal:   [] Normal gait with no signs of ataxia         [] Normal range of motion of neck        [] Abnormal     Neurological:        [] No Facial Asymmetry (Cranial nerve 7 motor function) (limited exam to video visit)          [] No gaze palsy        [] Abnormal         Skin:        [] No significant exanthematous lesions or discoloration noted on facial skin         [] Abnormal            Psychiatric:       [x] Normal Affect [] Abnormal        [] No Hallucinations    Other pertinent observable physical exam findings:          RECORD REVIEW: Previous medical records were reviewed at today's visit. The past family, medical and social histories were reviewed and unchanged with the exceptions of what is mentioned in this note. Due to this being a TeleHealth encounter, evaluation of the following organ systems is limited: Vitals/Constitutional/EENT/Resp/CV/GI//MS/Neuro/Skin/Heme-Lymph-Imm. ASSESSMENT/PLAN:  1. Essential hypertension    - amLODIPine (NORVASC) 10 MG tablet; TAKE 1 TABLET BY MOUTH EVERY DAY  Dispense: 90 tablet; Refill: 3    2. PVD (peripheral vascular disease) (HCC)  - atorvastatin (LIPITOR) 40 MG tablet; TAKE 1 TABLET BY MOUTH EVERY DAY  Dispense: 90 tablet; Refill: 3    3. Essential thrombocythemia ([address]      4. Cerebrovascular accident (CVA) due to thrombosis of vertebral artery, unspecified blood vessel laterality ([address]      5. Coronary artery disease involving native coronary artery of native heart without angina pectoris    - Comprehensive Metabolic Panel; Future    6. Black stools    - Iron and TIBC; Future    Plan :  Continue same treatment plan   Monitor renal function , liver function test and iron levels   Cancer screening was discussed but the patient deferred it to his cancer doctor    Will see him back in 2 months       No follow-ups on file. An  electronic signature was used to authenticate this note.     --Lynnette Garza MD on 4/9/2020 at 2:44

## 2020-04-13 ENCOUNTER — HOSPITAL ENCOUNTER (OUTPATIENT)
Facility: MEDICAL CENTER | Age: 50
End: 2020-04-13
Payer: COMMERCIAL

## 2020-05-14 RX ORDER — ASPIRIN 81 MG/1
TABLET, COATED ORAL
Qty: 30 TABLET | Refills: 5 | Status: SHIPPED | OUTPATIENT
Start: 2020-05-14 | End: 2020-12-08 | Stop reason: SDUPTHER

## 2020-05-14 RX ORDER — LANOLIN ALCOHOL/MO/W.PET/CERES
CREAM (GRAM) TOPICAL
Qty: 180 TABLET | Refills: 1 | Status: SHIPPED | OUTPATIENT
Start: 2020-05-14 | End: 2021-05-04 | Stop reason: SDUPTHER

## 2020-05-14 NOTE — TELEPHONE ENCOUNTER
Refill request for Iron and Aspirin. If appropriate please send medication(s) to patients pharmacy. Next appt: Patient is currently on wait list for provider.     Last appt: 4/9/2020    Health Maintenance   Topic Date Due    HIV screen  08/10/1984    Lipid screen  05/02/2019    Shingles Vaccine (1 of 2) 08/10/2019    Colon cancer screen colonoscopy  08/10/2019    Potassium monitoring  12/18/2019    Creatinine monitoring  12/18/2019    Flu vaccine (Season Ended) 09/01/2020    DTaP/Tdap/Td vaccine (2 - Td) 05/09/2028    Pneumococcal 0-64 years Vaccine  Completed    Hepatitis A vaccine  Aged Out    Hepatitis B vaccine  Aged Out    Hib vaccine  Aged Out    Meningococcal (ACWY) vaccine  Aged Out       Hemoglobin A1C (%)   Date Value   08/08/2018 4.9   05/02/2018 4.8             ( goal A1C is < 7)   No results found for: LABMICR  LDL Cholesterol (mg/dL)   Date Value   05/02/2018 41       (goal LDL is <100)   AST (U/L)   Date Value   11/27/2018 30     ALT (U/L)   Date Value   11/27/2018 46 (H)     BUN (mg/dL)   Date Value   12/18/2018 19     BP Readings from Last 3 Encounters:   01/03/20 131/87   10/01/19 128/80   09/20/19 127/88          (goal 120/80)          Patient Active Problem List:     Essential hypertension     PVD (peripheral vascular disease) (HCC)     Stage 3 chronic kidney disease (HCC)     Closed nondisplaced fracture of third metatarsal bone of left foot     Ischemia of toe     Critical lower limb ischemia     Gangrene of toe of left foot (HCC)     Bandemia     Thrombocytosis (HCC)     Moderate malnutrition (HCC)     Pain of lower extremity     Tobacco abuse     Ischemia of right lower extremity     COPD (chronic obstructive pulmonary disease) (HCC)     Peripheral vascular disease (HCC)     Pain, lower extremity     Kidney disease, chronic, stage III (GFR 30-59 ml/min) (HCC)     Hypertension     CAD (coronary artery disease)     Marijuana user     Normocytic anemia

## 2020-06-01 RX ORDER — IPRATROPIUM/ALBUTEROL SULFATE 20-100 MCG
1 MIST INHALER (GRAM) INHALATION EVERY 6 HOURS
Qty: 5 INHALER | Refills: 3 | Status: SHIPPED | OUTPATIENT
Start: 2020-06-01 | End: 2021-05-04 | Stop reason: SDUPTHER

## 2020-08-13 ENCOUNTER — HOSPITAL ENCOUNTER (OUTPATIENT)
Facility: MEDICAL CENTER | Age: 50
End: 2020-08-13
Payer: MEDICARE

## 2020-08-14 NOTE — TELEPHONE ENCOUNTER
right lower extremity     COPD (chronic obstructive pulmonary disease) (HCC)     Peripheral vascular disease (HCC)     Pain, lower extremity     Kidney disease, chronic, stage III (GFR 30-59 ml/min) (HCC)     Hypertension     CAD (coronary artery disease)     Marijuana user     Normocytic anemia

## 2020-08-18 ENCOUNTER — HOSPITAL ENCOUNTER (OUTPATIENT)
Facility: MEDICAL CENTER | Age: 50
Discharge: HOME | End: 2020-08-18
Payer: MEDICARE

## 2020-08-18 ENCOUNTER — TELEPHONE (OUTPATIENT)
Dept: ONCOLOGY | Age: 50
End: 2020-08-18

## 2020-08-18 ENCOUNTER — OFFICE VISIT (OUTPATIENT)
Dept: ONCOLOGY | Age: 50
End: 2020-08-18
Payer: MEDICARE

## 2020-08-18 VITALS
SYSTOLIC BLOOD PRESSURE: 140 MMHG | HEART RATE: 58 BPM | TEMPERATURE: 98.2 F | BODY MASS INDEX: 17.71 KG/M2 | RESPIRATION RATE: 16 BRPM | WEIGHT: 134.2 LBS | DIASTOLIC BLOOD PRESSURE: 85 MMHG

## 2020-08-18 DIAGNOSIS — D72.825 BANDEMIA: ICD-10-CM

## 2020-08-18 LAB
ABSOLUTE EOS #: 0.36 K/UL (ref 0–0.44)
ABSOLUTE IMMATURE GRANULOCYTE: 0.12 K/UL (ref 0–0.3)
ABSOLUTE LYMPH #: 2.14 K/UL (ref 1.1–3.7)
ABSOLUTE MONO #: 1.19 K/UL (ref 0.1–1.2)
BASOPHILS # BLD: 2 % (ref 0–2)
BASOPHILS ABSOLUTE: 0.24 K/UL (ref 0–0.2)
DIFFERENTIAL TYPE: ABNORMAL
EOSINOPHILS RELATIVE PERCENT: 3 % (ref 1–4)
HCT VFR BLD CALC: 43.9 % (ref 40.7–50.3)
HEMOGLOBIN: 14.2 G/DL (ref 13–17)
IMMATURE GRANULOCYTES: 1 %
LYMPHOCYTES # BLD: 18 % (ref 24–43)
MCH RBC QN AUTO: 36.7 PG (ref 25.2–33.5)
MCHC RBC AUTO-ENTMCNC: 32.3 G/DL (ref 28.4–34.8)
MCV RBC AUTO: 113.4 FL (ref 82.6–102.9)
MONOCYTES # BLD: 10 % (ref 3–12)
MORPHOLOGY: ABNORMAL
NRBC AUTOMATED: 0 PER 100 WBC
PDW BLD-RTO: 12.8 % (ref 11.8–14.4)
PLATELET # BLD: 725 K/UL (ref 138–453)
PLATELET ESTIMATE: ABNORMAL
PMV BLD AUTO: 8.6 FL (ref 8.1–13.5)
RBC # BLD: 3.87 M/UL (ref 4.21–5.77)
RBC # BLD: ABNORMAL 10*6/UL
SEG NEUTROPHILS: 66 % (ref 36–65)
SEGMENTED NEUTROPHILS ABSOLUTE COUNT: 7.85 K/UL (ref 1.5–8.1)
WBC # BLD: 11.9 K/UL (ref 3.5–11.3)
WBC # BLD: ABNORMAL 10*3/UL

## 2020-08-18 PROCEDURE — 85025 COMPLETE CBC W/AUTO DIFF WBC: CPT

## 2020-08-18 PROCEDURE — 99214 OFFICE O/P EST MOD 30 MIN: CPT | Performed by: INTERNAL MEDICINE

## 2020-08-18 PROCEDURE — 36415 COLL VENOUS BLD VENIPUNCTURE: CPT

## 2020-08-18 PROCEDURE — G8418 CALC BMI BLW LOW PARAM F/U: HCPCS | Performed by: INTERNAL MEDICINE

## 2020-08-18 PROCEDURE — G8427 DOCREV CUR MEDS BY ELIG CLIN: HCPCS | Performed by: INTERNAL MEDICINE

## 2020-08-18 PROCEDURE — 4004F PT TOBACCO SCREEN RCVD TLK: CPT | Performed by: INTERNAL MEDICINE

## 2020-08-18 PROCEDURE — 3017F COLORECTAL CA SCREEN DOC REV: CPT | Performed by: INTERNAL MEDICINE

## 2020-08-18 PROCEDURE — 99211 OFF/OP EST MAY X REQ PHY/QHP: CPT | Performed by: INTERNAL MEDICINE

## 2020-08-18 RX ORDER — HYDROXYUREA 500 MG/1
500 CAPSULE ORAL 2 TIMES DAILY
Qty: 120 CAPSULE | Refills: 3 | Status: SHIPPED | OUTPATIENT
Start: 2020-08-18 | End: 2020-12-08 | Stop reason: SDUPTHER

## 2020-08-18 NOTE — TELEPHONE ENCOUNTER
[facility] ARRIVES AMBULATORY FOR MD VISIT  DR Cem Membreno IN TO SEE PATIENT  ORDERS RECEIVED  SCRIPT SENT TO PATIENT'S PHARMACY  RV 3-4 MONTHS W/ LABS  MD VISIT 12/8/20 @ 3PM W/ CDP  AVS PRINTED AND GIVEN TO PATIENT W/ INSTRUCTIONS  PATIENT DISCHARGED AMBULATORY

## 2020-08-19 NOTE — PROGRESS NOTES
disease) ([facility] [address] Fracture of metatarsal bone, Gangrene ([facility] [address] Hypertension, Kidney disease, chronic, stage III (GFR 30-59 ml/min) ([facility]), Moderate malnutrition (HCC), MRSA (methicillin resistant staph aureus) culture positive, Pain, lower extremity, Peripheral vascular disease ([facility] [address] and Thrombocytosis ([facility] [address] Past Surgical History:   has a past surgical history that includes Coronary angioplasty with stent; other surgical history (10/26/2018); other surgical history; Tonsillectomy; Toe amputation (Right); Aorto-femoral Bypass Graft (12/13/2018); and aortofemoral-popliteal bypass graft (N/A, 12/13/2018). Family History: family history includes Diabetes in his mother; Heart Attack in his father, maternal grandmother, maternal uncle, and paternal grandfather. Social History:   reports that he has been smoking cigars. He started smoking about 37 years ago. He has a 37.00 pack-year smoking history. He has never used smokeless tobacco. He reports current drug use. Drug: Marijuana. He reports that he does not drink alcohol. Medications:    Prior to Admission medications    Medication Sig Start Date End Date Taking?  Authorizing Provider   hydroxyurea (HYDREA) 500 MG chemo capsule Take 1 capsule by mouth 2 times daily 8/18/20  Yes Twin Rosenberg MD   metoprolol tartrate (LOPRESSOR) 25 MG tablet TAKE 1 TABLET BY MOUTH TWICE A DAY 8/17/20  Yes Aixa Delvalle MD   COMBIVENT RESPIMAT  MCG/ACT AERS inhaler INHALE 1 PUFF INTO THE LUNGS EVERY 6 HOURS 6/1/20  Yes Aixa Delvalle MD   ferrous sulfate (FE TABS 325) 325 (65 Fe) MG EC tablet TAKE 1 TABLET BY MOUTH TWICE A DAY 5/14/20  Yes Aixa Delvalle MD   ASPIRIN LOW DOSE 81 MG EC tablet TAKE 1 TABLET BY MOUTH EVERY DAY 5/14/20  Yes Aixa Delvalle MD   amLODIPine (NORVASC) 10 MG tablet TAKE 1 TABLET BY MOUTH EVERY DAY 4/9/20  Yes Gilda Cochran MD   atorvastatin (LIPITOR) 40 MG tablet TAKE 1 TABLET BY MOUTH EVERY DAY 4/9/20  Yes Gilda Cochran MD   aspirin 81 MG tablet TABLET BY MOUTH EVERY DAY AT NIGHT 90 tablet 2    montelukast (SINGULAIR) 10 MG tablet Take 1 tablet by mouth nightly 30 tablet 3    mometasone-formoterol (DULERA) 200-5 MCG/ACT inhaler Inhale 2 puffs into the lungs every 12 hours 3 Inhaler 5    ipratropium-albuterol (DUONEB) 0.5-2.5 (3) MG/3ML SOLN nebulizer solution Take 3 mLs by nebulization every 4 hours as needed for Shortness of Breath 200 mL 1    clopidogrel (PLAVIX) 75 MG tablet TAKE 1 TABLET BY MOUTH EVERY DAY 90 tablet 3    albuterol sulfate HFA (VENTOLIN HFA) 108 (90 Base) MCG/ACT inhaler Inhale 2 puffs into the lungs every 6 hours as needed for Wheezing 1 Inhaler 5     No current facility-administered medications for this visit. Allergies:  Patient has no known allergies. REVIEW OF SYSTEMS:      · General: No weakness or fatigue. No unanticipated weight loss or decreased appetite. No fever or chills. · Eyes: No blurred vision, eye pain or double vision. · Ears: No hearing problems or drainage. No tinnitus. · Throat: No sore throat, problems with swallowing or dysphagia. · Respiratory: No cough, sputum or hemoptysis. Positive for shortness of breath. No pleuritic chest pain. · Cardiovascular: No chest pain, orthopnea or PND. No lower extremity edema. No palpitation. · Gastrointestinal: No problems with swallowing. No abdominal pain or bloating. No nausea or vomiting. No diarrhea or constipation. No GI bleeding. · Genitourinary: No dysuria, hematuria, frequency or urgency. · Musculoskeletal: No muscle aches or pains. No limitation of movement. No back pain. No gait disturbance, No joint complaints. · Dermatologic: No skin rashes or pruritus. No skin lesions or discolorations. · Psychiatric: No depression, anxiety, or stress or signs of schizophrenia. No change in mood or affect. · Hematologic: No history of bleeding tendency. No bruises or ecchymosis. No history of clotting problems.   · Infectious disease: No fever, chills or frequent infections. · Endocrine: No problems with obesity. No polydipsia or polyuria. No temperature intolerance. · Neurologic: No headaches or dizziness. No weakness or numbness of the extremities. No changes in balance, coordination,  memory, mentation, behavior. · Allergic/Immunologic: No nasal congestion or hives. No repeated infections. PHYSICAL EXAM:      BP (!) 140/85   Pulse 58   Temp 98.2 °F (36.8 °C) (Oral)   Resp 16   Wt 134 lb 3.2 oz (60.9 kg)   BMI 17.71 kg/m²    Temp (24hrs), Av.9 °F (36.6 °C), Min:97.3 °F (36.3 °C), Max:98.6 °F (37 °C)      General appearance - well appearing, not in pain or distress  Mental status - good mood, alert and oriented  Eyes - pupils equal and reactive, extraocular eye movements intact  Ears - bilateral TM's and external ear canals normal  Nose - normal and patent, no erythema, discharge or polyps  Mouth - mucous membranes moist, pharynx normal without lesions  Neck - supple, no significant adenopathy  Lymphatics - no palpable lymphadenopathy, no hepatosplenomegaly  Chest - clear to auscultation, no wheezes, rales or rhonchi, symmetric air entry  Heart - normal rate, regular rhythm, normal S1, S2, no murmurs, rubs, clicks or gallops  Abdomen - soft, nontender, nondistended, no masses or organomegaly  Neurological - alert, oriented, normal speech, no focal findings or movement disorder noted  Musculoskeletal - no joint tenderness, deformity or swelling.   Status post recent vascular surgery as above  Extremities - peripheral pulses normal, no pedal edema, no clubbing or cyanosis  Skin - normal coloration and turgor, no rashes, no suspicious skin lesions noted           DATA:      Labs:     Lab Results   Component Value Date    WBC 11.9 (H) 2020    HGB 14.2 2020    HCT 43.9 2020    .4 (H) 2020     (H) 2020       Chemistry        Component Value Date/Time     2018 0609    K 4.4 2018

## 2020-11-03 PROBLEM — I73.9 PVD (PERIPHERAL VASCULAR DISEASE) (HCC): Status: RESOLVED | Noted: 2018-04-20 | Resolved: 2020-11-03

## 2020-11-03 PROBLEM — I10 ESSENTIAL HYPERTENSION: Status: RESOLVED | Noted: 2018-04-20 | Resolved: 2020-11-03

## 2020-11-18 ENCOUNTER — TELEPHONE (OUTPATIENT)
Dept: INTERNAL MEDICINE | Age: 50
End: 2020-11-18

## 2020-12-01 ENCOUNTER — HOSPITAL ENCOUNTER (OUTPATIENT)
Facility: MEDICAL CENTER | Age: 50
End: 2020-12-01
Payer: MEDICARE

## 2020-12-08 ENCOUNTER — TELEPHONE (OUTPATIENT)
Dept: ONCOLOGY | Age: 50
End: 2020-12-08

## 2020-12-08 ENCOUNTER — OFFICE VISIT (OUTPATIENT)
Dept: ONCOLOGY | Age: 50
End: 2020-12-08
Payer: MEDICARE

## 2020-12-08 ENCOUNTER — HOSPITAL ENCOUNTER (OUTPATIENT)
Facility: MEDICAL CENTER | Age: 50
Discharge: HOME | End: 2020-12-08
Payer: MEDICARE

## 2020-12-08 VITALS
SYSTOLIC BLOOD PRESSURE: 118 MMHG | TEMPERATURE: 97.6 F | HEART RATE: 69 BPM | DIASTOLIC BLOOD PRESSURE: 82 MMHG | BODY MASS INDEX: 18.72 KG/M2 | WEIGHT: 141.9 LBS

## 2020-12-08 DIAGNOSIS — D47.3 ESSENTIAL THROMBOCYTHEMIA (HCC): ICD-10-CM

## 2020-12-08 LAB
ABSOLUTE EOS #: 0.35 K/UL (ref 0–0.44)
ABSOLUTE IMMATURE GRANULOCYTE: 0.1 K/UL (ref 0–0.3)
ABSOLUTE LYMPH #: 2.24 K/UL (ref 1.1–3.7)
ABSOLUTE MONO #: 1.38 K/UL (ref 0.1–1.2)
BASOPHILS # BLD: 1 % (ref 0–2)
BASOPHILS ABSOLUTE: 0.2 K/UL (ref 0–0.2)
DIFFERENTIAL TYPE: ABNORMAL
EOSINOPHILS RELATIVE PERCENT: 2 % (ref 1–4)
HCT VFR BLD CALC: 46.4 % (ref 40.7–50.3)
HEMOGLOBIN: 15.1 G/DL (ref 13–17)
IMMATURE GRANULOCYTES: 1 %
LYMPHOCYTES # BLD: 14 % (ref 24–43)
MCH RBC QN AUTO: 35.7 PG (ref 25.2–33.5)
MCHC RBC AUTO-ENTMCNC: 32.5 G/DL (ref 28.4–34.8)
MCV RBC AUTO: 109.7 FL (ref 82.6–102.9)
MONOCYTES # BLD: 9 % (ref 3–12)
NRBC AUTOMATED: 0 PER 100 WBC
PDW BLD-RTO: 14.9 % (ref 11.8–14.4)
PLATELET # BLD: 673 K/UL (ref 138–453)
PLATELET ESTIMATE: ABNORMAL
PMV BLD AUTO: 8.8 FL (ref 8.1–13.5)
RBC # BLD: 4.23 M/UL (ref 4.21–5.77)
RBC # BLD: ABNORMAL 10*6/UL
SEG NEUTROPHILS: 73 % (ref 36–65)
SEGMENTED NEUTROPHILS ABSOLUTE COUNT: 11.39 K/UL (ref 1.5–8.1)
WBC # BLD: 15.7 K/UL (ref 3.5–11.3)
WBC # BLD: ABNORMAL 10*3/UL

## 2020-12-08 PROCEDURE — 36415 COLL VENOUS BLD VENIPUNCTURE: CPT

## 2020-12-08 PROCEDURE — G8420 CALC BMI NORM PARAMETERS: HCPCS | Performed by: INTERNAL MEDICINE

## 2020-12-08 PROCEDURE — 99214 OFFICE O/P EST MOD 30 MIN: CPT | Performed by: INTERNAL MEDICINE

## 2020-12-08 PROCEDURE — 85025 COMPLETE CBC W/AUTO DIFF WBC: CPT

## 2020-12-08 PROCEDURE — G8484 FLU IMMUNIZE NO ADMIN: HCPCS | Performed by: INTERNAL MEDICINE

## 2020-12-08 PROCEDURE — G8427 DOCREV CUR MEDS BY ELIG CLIN: HCPCS | Performed by: INTERNAL MEDICINE

## 2020-12-08 PROCEDURE — 99211 OFF/OP EST MAY X REQ PHY/QHP: CPT | Performed by: INTERNAL MEDICINE

## 2020-12-08 PROCEDURE — 4004F PT TOBACCO SCREEN RCVD TLK: CPT | Performed by: INTERNAL MEDICINE

## 2020-12-08 PROCEDURE — 3017F COLORECTAL CA SCREEN DOC REV: CPT | Performed by: INTERNAL MEDICINE

## 2020-12-08 RX ORDER — HYDROXYUREA 500 MG/1
500 CAPSULE ORAL 2 TIMES DAILY
Qty: 120 CAPSULE | Refills: 3 | Status: SHIPPED | OUTPATIENT
Start: 2020-12-08 | End: 2021-09-24 | Stop reason: SDUPTHER

## 2020-12-08 NOTE — TELEPHONE ENCOUNTER
[facility] ARRIVES AMBULATORY FOR MD VISIT  DR Kerri Hernandez IN TO SEE PT  ORDERS RECEIVED  MAKE HYDREA 2 TABLETS DAILY  RV 6 MTHS W/ CDP AT RV  LABS CDP 6/8/21  MD VISIT 6/8/21 @ 1:30PM  SCRIPTS SENT TO PT PHARMACY  AVS PRINTED WITH INSTRUCTIONS GIVEN TO PT  PT DISCHARGED AMBULATORY

## 2020-12-09 NOTE — PROGRESS NOTES
Yes Mitch Peraza MD   montelukast (SINGULAIR) 10 MG tablet Take 1 tablet by mouth nightly 9/20/19  Yes Mitch Peraza MD   mometasone-formoterol [facility]) 200-5 MCG/ACT inhaler Inhale 2 puffs into the lungs every 12 hours 9/20/19  Yes Mitch Peraza MD   clopidogrel (PLAVIX) 75 MG tablet TAKE 1 TABLET BY MOUTH EVERY DAY 9/20/19  Yes Mitch Peraza MD   albuterol sulfate HFA (VENTOLIN HFA) 108 (90 Base) MCG/ACT inhaler Inhale 2 puffs into the lungs every 6 hours as needed for Wheezing 9/20/19  Yes Mitch Peraza MD     Current Outpatient Medications   Medication Sig Dispense Refill    hydroxyurea (HYDREA) 500 MG chemo capsule Take 1 capsule by mouth 2 times daily 120 capsule 3    metoprolol tartrate (LOPRESSOR) 25 MG tablet TAKE 1 TABLET BY MOUTH TWICE A  tablet 1    COMBIVENT RESPIMAT  MCG/ACT AERS inhaler INHALE 1 PUFF INTO THE LUNGS EVERY 6 HOURS 5 Inhaler 3    ferrous sulfate (FE TABS 325) 325 (65 Fe) MG EC tablet TAKE 1 TABLET BY MOUTH TWICE A  tablet 1    amLODIPine (NORVASC) 10 MG tablet TAKE 1 TABLET BY MOUTH EVERY DAY 90 tablet 3    atorvastatin (LIPITOR) 40 MG tablet TAKE 1 TABLET BY MOUTH EVERY DAY 90 tablet 3    aspirin 81 MG tablet Take 1 tablet by mouth daily 30 tablet 5    traZODone (DESYREL) 50 MG tablet TAKE 1 TABLET BY MOUTH EVERY DAY AT NIGHT 90 tablet 2    montelukast (SINGULAIR) 10 MG tablet Take 1 tablet by mouth nightly 30 tablet 3    mometasone-formoterol (DULERA) 200-5 MCG/ACT inhaler Inhale 2 puffs into the lungs every 12 hours 3 Inhaler 5    clopidogrel (PLAVIX) 75 MG tablet TAKE 1 TABLET BY MOUTH EVERY DAY 90 tablet 3    albuterol sulfate HFA (VENTOLIN HFA) 108 (90 Base) MCG/ACT inhaler Inhale 2 puffs into the lungs every 6 hours as needed for Wheezing 1 Inhaler 5     No current facility-administered medications for this visit. Allergies:  Patient has no known allergies.     REVIEW OF SYSTEMS:      · General: No weakness or normal  Nose - normal and patent, no erythema, discharge or polyps  Mouth - mucous membranes moist, pharynx normal without lesions  Neck - supple, no significant adenopathy  Lymphatics - no palpable lymphadenopathy, no hepatosplenomegaly  Chest - clear to auscultation, no wheezes, rales or rhonchi, symmetric air entry  Heart - normal rate, regular rhythm, normal S1, S2, no murmurs, rubs, clicks or gallops  Abdomen - soft, nontender, nondistended, no masses or organomegaly  Neurological - alert, oriented, normal speech, no focal findings or movement disorder noted  Musculoskeletal - no joint tenderness, deformity or swelling. Status post recent vascular surgery as above  Extremities - peripheral pulses normal, no pedal edema, no clubbing or cyanosis  Skin - normal coloration and turgor, no rashes, no suspicious skin lesions noted           DATA:      Labs:     Lab Results   Component Value Date    WBC 15.7 (H) 12/08/2020    HGB 15.1 12/08/2020    HCT 46.4 12/08/2020    .7 (H) 12/08/2020     (H) 12/08/2020       Chemistry        Component Value Date/Time     12/18/2018 0609    K 4.4 12/18/2018 0609    CL 96 (L) 12/18/2018 0609    CO2 23 12/18/2018 0609    BUN 19 12/18/2018 0609    CREATININE 1.23 (H) 12/18/2018 0609        Component Value Date/Time    CALCIUM 9.6 12/18/2018 0609    ALKPHOS 138 (H) 11/27/2018 1412    AST 30 11/27/2018 1412    ALT 46 (H) 11/27/2018 1412    BILITOT 0.97 11/27/2018 1412        JAK2 gene mutation 12/17/18:  12/23/2018  5:58 PM - Cesario, [facility] Incoming Lab Results From [facility]     Component Results     Component Value Ref Range & Units Status Collected Lab   V617F Mutation, Qnt 21.3  % Final 12/17/2018  5:59 AM [facility]   (NOTE)   There is evidence of the JAK2 (V617F) point mutation by real-time   PCR analysis. This result has been reviewed and approved by Haseeb Reyez M.D.,   Ph.D.   Background Information: JAK2 Gene, V617F Mutation, Quantitative   Test information:    This assay is designed to detect the point mutation   c.1849G>T (V617F) of the JAK2 gene. JAK2 V617F mutations   are present in patients with myeloproliferative neoplasms. Methodology:        BM test 1/15/19:  Final Diagnosis   PERIPHERAL BLOOD:   -SEVERE THROMBOCYTOSIS. -MILD NEUTROPHILIA. -MILD EOSINOPHILIA. -MILD NORMOCYTIC ANEMIA. BONE MARROW BIOPSY, ASPIRATE SMEAR AND CLOT SECTION:   -MILDLY HYPOCELLULAR HEMATOPOIETIC MARROW WITH MILD RELATIVE   MYELOID HYPERPLASIA/ ERYTHROID HYPOPLASIA, ENLARGED AND INCREASED   MEGAKARYOCYTES, AND IRON DEPLETION. -COMPATIBLE WITH MYELOPROLIFERATIVE NEOPLASM ((ESSENTIAL   THROMBOCYTHEMIA). COMMENT:  THE PATIENT HAS MARKED PERSISTENT THROMBOCYTOSIS AND MILD   NEUTROPHILIA.  BCR-ABL BY RT-PCR IS NEGATIVE.  JAK2 V617F MUTATION IS   POSITIVE.  THE BONE MARROW HAS INCREASED MEGAKARYOCYTES WHICH ARE   ENLARGED AND HAVE HYPERLOBATE NUCLEI.  THE FINDINGS ARE COMPATIBLE   WITH MYELOPROLIFERATIVE NEOPLASM (ESSENTIAL THROMBOCYTHEMIA) .          IMPRESSION:    Chronic persistent leukocytosis  Chronic persistent thrombocytosis  Positive JAK2 gene mutation. Myeloproliferative disorder. Essential thrombocythemia. Chronic normocytic anemia  Peripheral vascular disease  Chronic tobacco abuse  Chronic marijuana abuse  Iron deficiency. Peripheral vascular disease. RECOMMENDATIONS:  1. I reviewed the labs as above and discussed with the patient. I explained results of BM test. I explained to the patient the nature of this hematologic problem. I explained the significance of these abnormalities in layman language. Myeloproliferative disorder. Explained the nature of essential thrombocythemia. 2. Patient had very good initial response to hydroxyurea. The dose of hydroxyurea was previously adjusted due to development of anemia. Labs from today showed very good results. 3. We will continue Hydrea. We will give Hydrea twice daily.    4. With patient's history of chronic

## 2020-12-23 DIAGNOSIS — I73.9 PVD (PERIPHERAL VASCULAR DISEASE) (HCC): ICD-10-CM

## 2020-12-23 NOTE — TELEPHONE ENCOUNTER
right lower extremity     COPD (chronic obstructive pulmonary disease) (HCC)     Peripheral vascular disease (HCC)     Pain, lower extremity     Kidney disease, chronic, stage III (GFR 30-59 ml/min)     Hypertension     CAD (coronary artery disease)     Marijuana user     Normocytic anemia

## 2020-12-23 NOTE — TELEPHONE ENCOUNTER
Refill request for Plavix. If appropriate please send medication(s) to patients pharmacy.     Next appt: 1/5/2021      Health Maintenance   Topic Date Due    Hepatitis C screen  1970    HIV screen  08/10/1984    Lipid screen  05/02/2019    Shingles Vaccine (1 of 2) 08/10/2019    Colon cancer screen colonoscopy  08/10/2019    Potassium monitoring  12/18/2019    Creatinine monitoring  12/18/2019    Annual Wellness Visit (AWV)  08/30/2020    Flu vaccine (1) 09/01/2020    DTaP/Tdap/Td vaccine (2 - Td) 05/09/2028    Pneumococcal 0-64 years Vaccine  Completed    Hepatitis A vaccine  Aged Out    Hepatitis B vaccine  Aged Out    Hib vaccine  Aged Out    Meningococcal (ACWY) vaccine  Aged Out       Hemoglobin A1C (%)   Date Value   08/08/2018 4.9   05/02/2018 4.8             ( goal A1C is < 7)   No results found for: LABMICR  LDL Cholesterol (mg/dL)   Date Value   05/02/2018 41       (goal LDL is <100)   AST (U/L)   Date Value   11/27/2018 30     ALT (U/L)   Date Value   11/27/2018 46 (H)     BUN (mg/dL)   Date Value   12/18/2018 19     BP Readings from Last 3 Encounters:   12/08/20 118/82   08/18/20 (!) 140/85   01/03/20 131/87          (goal 120/80)          Patient Active Problem List:     Stage 3 chronic kidney disease (HCC)     Closed nondisplaced fracture of third metatarsal bone of left foot     Ischemia of toe     Critical lower limb ischemia     Gangrene of toe of left foot (HCC)     Bandemia     Essential thrombocythemia (HCC)     Moderate malnutrition (HCC)     Pain of lower extremity     Tobacco abuse     Ischemia of right lower extremity     COPD (chronic obstructive pulmonary disease) (HCC)     Peripheral vascular disease (HCC)     Pain, lower extremity     Kidney disease, chronic, stage III (GFR 30-59 ml/min)     Hypertension     CAD (coronary artery disease)     Marijuana user     Normocytic anemia

## 2020-12-27 RX ORDER — CLOPIDOGREL BISULFATE 75 MG/1
TABLET ORAL
Qty: 90 TABLET | Refills: 3 | OUTPATIENT
Start: 2020-12-27

## 2020-12-27 RX ORDER — CLOPIDOGREL BISULFATE 75 MG/1
TABLET ORAL
Qty: 90 TABLET | Refills: 3 | Status: SHIPPED | OUTPATIENT
Start: 2020-12-27 | End: 2021-05-04 | Stop reason: SDUPTHER

## 2021-04-02 DIAGNOSIS — J34.89 RHINORRHEA: ICD-10-CM

## 2021-04-02 DIAGNOSIS — J44.9 CHRONIC OBSTRUCTIVE PULMONARY DISEASE, UNSPECIFIED COPD TYPE (HCC): ICD-10-CM

## 2021-04-05 RX ORDER — MONTELUKAST SODIUM 10 MG/1
TABLET ORAL
Qty: 30 TABLET | Refills: 3 | Status: SHIPPED | OUTPATIENT
Start: 2021-04-05 | End: 2021-05-04 | Stop reason: SDUPTHER

## 2021-04-05 NOTE — TELEPHONE ENCOUNTER
Pt has a future appointment       singulair refill    Next Visit Date:  Future Appointments   Date Time Provider Ezequiel Ade   5/4/2021  2:45 PM Helio Spain MD [facility]   6/8/2021  1:30 PM Larry Sorensen MD [id number] [facility] Maintenance   Topic Date Due    Hepatitis C screen  Never done    HIV screen  Never done    COVID-19 Vaccine (1) Never done    Lipid screen  05/02/2019    Shingles Vaccine (1 of 2) Never done    Colon cancer screen colonoscopy  Never done    Potassium monitoring  12/18/2019    Creatinine monitoring  12/18/2019    Annual Wellness Visit (AWV)  Never done    Flu vaccine (Season Ended) 09/01/2021    DTaP/Tdap/Td vaccine (2 - Td) 05/09/2028    Pneumococcal 0-64 years Vaccine  Completed    Hepatitis A vaccine  Aged Out    Hepatitis B vaccine  Aged Out    Hib vaccine  Aged Out    Meningococcal (ACWY) vaccine  Aged Out       Hemoglobin A1C (%)   Date Value   08/08/2018 4.9   05/02/2018 4.8             ( goal A1C is < 7)   No results found for: LABMICR  LDL Cholesterol (mg/dL)   Date Value   05/02/2018 41       (goal LDL is <100)   AST (U/L)   Date Value   11/27/2018 30     ALT (U/L)   Date Value   11/27/2018 46 (H)     BUN (mg/dL)   Date Value   12/18/2018 19     BP Readings from Last 3 Encounters:   12/08/20 118/82   08/18/20 (!) 140/85   01/03/20 131/87          (goal 120/80)    All Future Testing planned in [facility]  Lab Frequency Next Occurrence   Comprehensive Metabolic Panel Once 03/32/5896   Iron and TIBC Once 04/09/2021               Patient Active Problem List:     Stage 3 chronic kidney disease (HCC)     Closed nondisplaced fracture of third metatarsal bone of left foot     Ischemia of toe     Critical lower limb ischemia     Gangrene of toe of left foot (HCC)     Bandemia     Essential thrombocythemia ([address]     Moderate malnutrition (HCC)     Pain of lower extremity     Tobacco abuse     Ischemia of right lower extremity     COPD (chronic obstructive pulmonary disease) (HCC)     Peripheral vascular disease (HCC)     Pain, lower extremity     Kidney disease, chronic, stage III (GFR 30-59 ml/min)     Hypertension     CAD (coronary artery disease)     Marijuana user     Normocytic anemia

## 2021-05-04 ENCOUNTER — OFFICE VISIT (OUTPATIENT)
Dept: INTERNAL MEDICINE | Age: 51
End: 2021-05-04
Payer: MEDICARE

## 2021-05-04 VITALS
HEIGHT: 73 IN | WEIGHT: 142.8 LBS | SYSTOLIC BLOOD PRESSURE: 113 MMHG | TEMPERATURE: 98.4 F | OXYGEN SATURATION: 97 % | HEART RATE: 68 BPM | BODY MASS INDEX: 18.92 KG/M2 | DIASTOLIC BLOOD PRESSURE: 75 MMHG

## 2021-05-04 DIAGNOSIS — I10 ESSENTIAL HYPERTENSION: ICD-10-CM

## 2021-05-04 DIAGNOSIS — R07.9 CHEST PAIN AT REST: Primary | ICD-10-CM

## 2021-05-04 DIAGNOSIS — J44.9 CHRONIC OBSTRUCTIVE PULMONARY DISEASE, UNSPECIFIED COPD TYPE (HCC): ICD-10-CM

## 2021-05-04 DIAGNOSIS — Z23 NEED FOR PROPHYLACTIC VACCINATION AND INOCULATION AGAINST VARICELLA: ICD-10-CM

## 2021-05-04 DIAGNOSIS — I73.9 PVD (PERIPHERAL VASCULAR DISEASE) (HCC): ICD-10-CM

## 2021-05-04 DIAGNOSIS — Z29.9 PREVENTIVE MEASURE: ICD-10-CM

## 2021-05-04 DIAGNOSIS — D50.8 OTHER IRON DEFICIENCY ANEMIA: ICD-10-CM

## 2021-05-04 DIAGNOSIS — J34.89 RHINORRHEA: ICD-10-CM

## 2021-05-04 DIAGNOSIS — D47.3 ESSENTIAL THROMBOCYTHEMIA (HCC): ICD-10-CM

## 2021-05-04 DIAGNOSIS — R06.02 SHORTNESS OF BREATH: ICD-10-CM

## 2021-05-04 PROCEDURE — 3017F COLORECTAL CA SCREEN DOC REV: CPT | Performed by: INTERNAL MEDICINE

## 2021-05-04 PROCEDURE — 99211 OFF/OP EST MAY X REQ PHY/QHP: CPT | Performed by: INTERNAL MEDICINE

## 2021-05-04 PROCEDURE — 3023F SPIROM DOC REV: CPT | Performed by: INTERNAL MEDICINE

## 2021-05-04 PROCEDURE — 4004F PT TOBACCO SCREEN RCVD TLK: CPT | Performed by: INTERNAL MEDICINE

## 2021-05-04 PROCEDURE — G8420 CALC BMI NORM PARAMETERS: HCPCS | Performed by: INTERNAL MEDICINE

## 2021-05-04 PROCEDURE — 99213 OFFICE O/P EST LOW 20 MIN: CPT | Performed by: STUDENT IN AN ORGANIZED HEALTH CARE EDUCATION/TRAINING PROGRAM

## 2021-05-04 PROCEDURE — G8926 SPIRO NO PERF OR DOC: HCPCS | Performed by: INTERNAL MEDICINE

## 2021-05-04 PROCEDURE — G8427 DOCREV CUR MEDS BY ELIG CLIN: HCPCS | Performed by: INTERNAL MEDICINE

## 2021-05-04 RX ORDER — ASPIRIN 81 MG/1
81 TABLET, CHEWABLE ORAL DAILY
Qty: 30 TABLET | Refills: 3 | Status: SHIPPED | OUTPATIENT
Start: 2021-05-04 | End: 2021-06-08

## 2021-05-04 RX ORDER — MONTELUKAST SODIUM 10 MG/1
TABLET ORAL
Qty: 30 TABLET | Refills: 3 | Status: SHIPPED | OUTPATIENT
Start: 2021-05-04

## 2021-05-04 RX ORDER — LANOLIN ALCOHOL/MO/W.PET/CERES
CREAM (GRAM) TOPICAL
Qty: 180 TABLET | Refills: 1 | Status: SHIPPED | OUTPATIENT
Start: 2021-05-04 | End: 2021-11-09

## 2021-05-04 RX ORDER — TIOTROPIUM BROMIDE 18 UG/1
18 CAPSULE ORAL; RESPIRATORY (INHALATION) DAILY
Qty: 90 CAPSULE | Refills: 1 | Status: SHIPPED | OUTPATIENT
Start: 2021-05-04 | End: 2021-11-09 | Stop reason: ALTCHOICE

## 2021-05-04 RX ORDER — CLOPIDOGREL BISULFATE 75 MG/1
75 TABLET ORAL DAILY
Qty: 90 TABLET | Refills: 3 | Status: SHIPPED | OUTPATIENT
Start: 2021-05-04

## 2021-05-04 RX ORDER — IPRATROPIUM/ALBUTEROL SULFATE 20-100 MCG
1 MIST INHALER (GRAM) INHALATION EVERY 6 HOURS
Qty: 5 INHALER | Refills: 3 | Status: SHIPPED | OUTPATIENT
Start: 2021-05-04

## 2021-05-04 NOTE — PROGRESS NOTES
[facility]    Progress Note    Date of patient's visit: 5/4/2021  YOB: 1970  Patient's Name:  Carmencita Souza    Patient Care Team:  Jennifer Hewitt MD as PCP - General (Internal Medicine)    REASON FOR VISIT: Routine outpatient follow     HISTORY OF PRESENT ILLNESS:    History was obtained from the patient. Carmencita Souza is a 45 y.o. is here for follow up of COPD and hypertension. Patient has history of smoking for 37 years, states that recently quit, no smoking only marijuana. He has been using Dulera. Patient states that in the last few months he has noticed that he has started shortness of breath which last for few minutes. Patient has chest pain as well at the same time. Not really related to exertion. Patient states that he was given nitro for the pain. No previous history of cardiac disease. Echo in 2018 was normal.    History of hypertension, controlled on Lopressor. Patient has history of aorto bifemoral bypass, December 2018, on Plavix  Has history of essential thrombocytosis, JAK2 mutation positive, on Hydrea and aspirin as per heme-onc. Patient states that recently he has been having rash and toes change color to blue after hot shower. I advised him to see and discuss with oncology.     Given patient risk factors and atypical chest pain and shortness of breath will repeat PFTs, add Spiriva, check stress test.      Patient Active Problem List   Diagnosis    Stage 3 chronic kidney disease ([address]    Closed nondisplaced fracture of third metatarsal bone of left foot    Ischemia of toe    Critical lower limb ischemia    Gangrene of toe of left foot ([address]    Bandemia    Essential thrombocythemia ([address]    Moderate malnutrition ([address]    Pain of lower extremity    Tobacco abuse    Ischemia of right lower extremity    COPD (chronic obstructive pulmonary disease) ([address]    Peripheral vascular disease ([address]    Pain, lower extremity    Kidney disease, chronic, stage III (GFR 30-59 ml/min)    Hypertension    CAD (coronary artery disease)    Marijuana user    Normocytic anemia       ALLERGIES    No Known Allergies      MEDICATIONS:      Current Outpatient Medications on File Prior to Visit   Medication Sig Dispense Refill    montelukast (SINGULAIR) 10 MG tablet TAKE 1 TABLET BY MOUTH EVERY DAY AT NIGHT 30 tablet 3    clopidogrel (PLAVIX) 75 MG tablet TAKE 1 TABLET BY MOUTH EVERY DAY 90 tablet 3    hydroxyurea (HYDREA) 500 MG chemo capsule Take 1 capsule by mouth 2 times daily 120 capsule 3    metoprolol tartrate (LOPRESSOR) 25 MG tablet TAKE 1 TABLET BY MOUTH TWICE A  tablet 1    COMBIVENT RESPIMAT  MCG/ACT AERS inhaler INHALE 1 PUFF INTO THE LUNGS EVERY 6 HOURS 5 Inhaler 3    amLODIPine (NORVASC) 10 MG tablet TAKE 1 TABLET BY MOUTH EVERY DAY 90 tablet 3    atorvastatin (LIPITOR) 40 MG tablet TAKE 1 TABLET BY MOUTH EVERY DAY 90 tablet 3    aspirin 81 MG tablet Take 1 tablet by mouth daily 30 tablet 5    traZODone (DESYREL) 50 MG tablet TAKE 1 TABLET BY MOUTH EVERY DAY AT NIGHT 90 tablet 2    mometasone-formoterol (DULERA) 200-5 MCG/ACT inhaler Inhale 2 puffs into the lungs every 12 hours 3 Inhaler 5    albuterol sulfate HFA (VENTOLIN HFA) 108 (90 Base) MCG/ACT inhaler Inhale 2 puffs into the lungs every 6 hours as needed for Wheezing 1 Inhaler 5    ferrous sulfate (FE TABS 325) 325 (65 Fe) MG EC tablet TAKE 1 TABLET BY MOUTH TWICE A DAY (Patient not taking: Reported on 5/4/2021) 180 tablet 1     No current facility-administered medications on file prior to visit. SOCIAL HISTORY    Reviewed and no change from previous record. Kranthi Domínguez  reports that he has been smoking cigars. He started smoking about 38 years ago. He has a 37.00 pack-year smoking history.  He has never used smokeless tobacco.    FAMILY HISTORY:    Reviewed and No change from previous visit    REVIEW OF SYSTEMS:    ENT: negative  Respiratory: no cough, shortness of breath, or wheezing  Cardiovascular: no chest pain or dyspnea on exertion  Gastrointestinal: no abdominal pain, black or bloody stools  Neurological: no TIA or stroke symptoms  Endocrine: negative  Genito-Urinary: no dysuria, trouble voiding, or hematuria  Musculoskeletal: negative  Dermatological: negative    PHYSICAL EXAM:      Vitals:    05/04/21 1454   BP: 113/75   Pulse: 68   Temp: 98.4 °F (36.9 °C)   SpO2: 97%     Physical Exam  HENT:      Head: Normocephalic. Eyes:      Pupils: Pupils are equal, round, and reactive to light. Cardiovascular:      Rate and Rhythm: Normal rate and regular rhythm. Pulmonary:      Effort: Pulmonary effort is normal.   Abdominal:      General: Abdomen is flat. Musculoskeletal: Normal range of motion. Skin:     General: Skin is warm. Neurological:      General: No focal deficit present. Mental Status: He is alert and oriented to person, place, and time. LABORATORY FINDINGS:    CBC:  Lab Results   Component Value Date    WBC 15.7 12/08/2020    HGB 15.1 12/08/2020     12/08/2020     BMP:    Lab Results   Component Value Date     12/18/2018    K 4.4 12/18/2018    CL 96 12/18/2018    CO2 23 12/18/2018    BUN 19 12/18/2018    CREATININE 1.23 12/18/2018    GLUCOSE 140 12/18/2018     HEMOGLOBIN A1C:   Lab Results   Component Value Date    LABA1C 4.9 08/08/2018     MICROALBUMIN URINE: No results found for: MICROALBUR  FASTING LIPID PANEL:  Lab Results   Component Value Date    CHOL 89 05/02/2018    HDL 30 (L) 05/02/2018    TRIG 91 05/02/2018     LIVER PROFILE:  Lab Results   Component Value Date    ALT 46 11/27/2018    AST 30 11/27/2018    PROT 9.0 11/27/2018    BILITOT 0.97 11/27/2018    LABALBU 5.1 11/27/2018      THYROID FUNCTION: No results found for: TSH   URINE ANALYSIS: No results found for: LABURIN  ASSESSMENT AND PLAN:    1.  Chronic obstructive pulmonary disease, unspecified COPD type ([facility]    - COMBIVENT RESPIMAT  MCG/ACT AERS inhaler; Inhale 1 puff into the lungs every 6 hours  Dispense: 5 Inhaler; Refill: 3  - montelukast (SINGULAIR) 10 MG tablet; TAKE 1 TABLET BY MOUTH EVERY DAY AT NIGHT  Dispense: 30 tablet; Refill: 3  - Full PFT Study With Bronchodilator; Future  - NM MYOCARDIAL SPECT REST EXERCISE OR RX; Future  - tiotropium (SPIRIVA HANDIHALER) 18 MCG inhalation capsule; Inhale 1 capsule into the lungs daily  Dispense: 90 capsule; Refill: 1    2. PVD (peripheral vascular disease) (HCC)    - clopidogrel (PLAVIX) 75 MG tablet; Take 1 tablet by mouth daily  Dispense: 90 tablet; Refill: 3  - aspirin (ASPIRIN CHILDRENS) 81 MG chewable tablet; Take 1 tablet by mouth daily  Dispense: 30 tablet; Refill: 3    3. Other iron deficiency anemia    - ferrous sulfate (FE TABS 325) 325 (65 Fe) MG EC tablet; TAKE 1 TABLET BY MOUTH TWICE A DAY  Dispense: 180 tablet; Refill: 1    4. Essential hypertension    - metoprolol tartrate (LOPRESSOR) 25 MG tablet; Take 1 tablet by mouth 2 times daily  Dispense: 180 tablet; Refill: 1    5. Rhinorrhea    - montelukast (SINGULAIR) 10 MG tablet; TAKE 1 TABLET BY MOUTH EVERY DAY AT NIGHT  Dispense: 30 tablet; Refill: 3    6. Chest pain at rest    - 6 Minute Walk Test; Future  - NM MYOCARDIAL SPECT REST EXERCISE OR RX; Future    7. Shortness of breath    - Full PFT Study With Bronchodilator; Future  - 6 Minute Walk Test; Future    8. Essential thrombocythemia (HCC)    - aspirin (ASPIRIN CHILDRENS) 81 MG chewable tablet; Take 1 tablet by mouth daily  Dispense: 30 tablet; Refill: 3    9. Preventive measure  - HIV Screen; Future  - Hepatitis C Antibody; Future  - Lipid, Fasting; Future  - Zoster recombinant [facility])      FOLLOW UP:       1. Marci Deem received counseling on the following healthy behaviors: nutrition, exercise and medication adherence  2. Patient given educational materials - see patient instructions  3. Discussed use, benefit, and side effects of prescribed medications.   Barriers to medication compliance addressed. All patient questions answered. Pt voiced understanding. 4.  Reviewed prior labs and health maintenance  5. Continue current medications, diet and exercise. Lex Kidd MD  INTERNAL MEDICINE RESIDENT, PGY-2  [id number] [facility], [address] [place] [address]  3/0/2724,9:38 PM   Attending Physician Statement  I have discussed the care of Rey Winchester, including pertinent history and exam findings,  with the resident. I have reviewed the key elements of all parts of the encounter with the resident. I agree with the assessment, plan and orders as documented by the resident. (GE Modifier)    Pt with essential thrombocythemia. Sees heme-onc. On Hydrea. Atypical chest pain. Will obtain stress test. Some increase in chronic SOB. Will add Spiriva. Short term medical clinic followup planned.

## 2021-05-04 NOTE — PATIENT INSTRUCTIONS
Medications e-scribe to pharmacy of pt's choice. Labs given to patient, they will have them done before their next visit. Printed prescription for Shingles vaccines given to patient. Order for Pulmonary Function Test faxed to [address] [place] [address] they will all pt for appt. Please call [phone] in not heard within 2 weeks. Patient was put on a wait list for 2 months and will be contacted to schedule their next follow up appointment once the schedule is available. If the patient is in need of an appointment before their next visit please call the office at [phone]. After Visit Summary  given and reviewed.

## 2021-05-07 DIAGNOSIS — I73.9 PVD (PERIPHERAL VASCULAR DISEASE) (HCC): ICD-10-CM

## 2021-05-07 RX ORDER — ATORVASTATIN CALCIUM 40 MG/1
TABLET, FILM COATED ORAL
Qty: 90 TABLET | Refills: 3 | Status: SHIPPED | OUTPATIENT
Start: 2021-05-07 | End: 2022-05-31

## 2021-05-07 NOTE — TELEPHONE ENCOUNTER
Request for Atorvastatin.     Last Visit Date: 5/4/2021  Next Visit Date:  Future Appointments   Date Time Provider Ezequiel Tavaresi   6/8/2021  1:30 PM Mulu Berry MD [id number] [facility] [employer] Maintenance   Topic Date Due    Hepatitis C screen  Never done    HIV screen  Never done    COVID-19 Vaccine (1) Never done    Lipid screen  05/02/2019    Shingles Vaccine (1 of 2) Never done    Colon cancer screen colonoscopy  Never done    Potassium monitoring  12/18/2019    Creatinine monitoring  12/18/2019    Annual Wellness Visit (AWV)  Never done    Flu vaccine (Season Ended) 09/01/2021    DTaP/Tdap/Td vaccine (2 - Td) 05/09/2028    Pneumococcal 0-64 years Vaccine  Completed    Hepatitis A vaccine  Aged Out    Hepatitis B vaccine  Aged Out    Hib vaccine  Aged Out    Meningococcal (ACWY) vaccine  Aged Out       Hemoglobin A1C (%)   Date Value   08/08/2018 4.9   05/02/2018 4.8             ( goal A1C is < 7)   No results found for: LABMICR  LDL Cholesterol (mg/dL)   Date Value   05/02/2018 41       (goal LDL is <100)   AST (U/L)   Date Value   11/27/2018 30     ALT (U/L)   Date Value   11/27/2018 46 (H)     BUN (mg/dL)   Date Value   12/18/2018 19     BP Readings from Last 3 Encounters:   05/04/21 113/75   12/08/20 118/82   08/18/20 (!) 140/85          (goal 120/80)    All Future Testing planned in [facility]  Lab Frequency Next Occurrence   Full PFT Study With Bronchodilator Once 05/05/2021   6 Minute Walk Test Once 05/05/2021   NM MYOCARDIAL SPECT REST EXERCISE OR RX Once 05/05/2021   HIV Screen Once 05/04/2021   Hepatitis C Antibody Once 05/04/2021   Lipid, Fasting Once 05/04/2021         Patient Active Problem List:     Stage 3 chronic kidney disease ([address]     Closed nondisplaced fracture of third metatarsal bone of left foot     Ischemia of toe     Critical lower limb ischemia     Gangrene of toe of left foot ([address]     Bandemia     Essential thrombocythemia ([address]     Moderate

## 2021-06-01 ENCOUNTER — HOSPITAL ENCOUNTER (OUTPATIENT)
Facility: MEDICAL CENTER | Age: 51
End: 2021-06-01
Payer: MEDICARE

## 2021-06-04 DIAGNOSIS — D75.839 THROMBOCYTOSIS: Primary | ICD-10-CM

## 2021-06-08 ENCOUNTER — OFFICE VISIT (OUTPATIENT)
Dept: ONCOLOGY | Age: 51
End: 2021-06-08
Payer: MEDICARE

## 2021-06-08 ENCOUNTER — HOSPITAL ENCOUNTER (OUTPATIENT)
Facility: MEDICAL CENTER | Age: 51
Discharge: HOME | End: 2021-06-08
Payer: MEDICARE

## 2021-06-08 ENCOUNTER — TELEPHONE (OUTPATIENT)
Dept: ONCOLOGY | Age: 51
End: 2021-06-08

## 2021-06-08 VITALS
TEMPERATURE: 98 F | SYSTOLIC BLOOD PRESSURE: 143 MMHG | DIASTOLIC BLOOD PRESSURE: 87 MMHG | BODY MASS INDEX: 17.92 KG/M2 | HEART RATE: 65 BPM | WEIGHT: 135.8 LBS | RESPIRATION RATE: 16 BRPM

## 2021-06-08 DIAGNOSIS — D47.3 ESSENTIAL THROMBOCYTHEMIA (HCC): Primary | ICD-10-CM

## 2021-06-08 DIAGNOSIS — D75.839 THROMBOCYTOSIS: ICD-10-CM

## 2021-06-08 LAB
ABSOLUTE EOS #: 0.25 K/UL (ref 0–0.44)
ABSOLUTE IMMATURE GRANULOCYTE: 0.07 K/UL (ref 0–0.3)
ABSOLUTE LYMPH #: 2.21 K/UL (ref 1.1–3.7)
ABSOLUTE MONO #: 1.37 K/UL (ref 0.1–1.2)
BASOPHILS # BLD: 2 % (ref 0–2)
BASOPHILS ABSOLUTE: 0.28 K/UL (ref 0–0.2)
DIFFERENTIAL TYPE: ABNORMAL
EOSINOPHILS RELATIVE PERCENT: 2 % (ref 1–4)
HCT VFR BLD CALC: 44.8 % (ref 40.7–50.3)
HEMOGLOBIN: 14.7 G/DL (ref 13–17)
IMMATURE GRANULOCYTES: 1 %
LYMPHOCYTES # BLD: 17 % (ref 24–43)
MCH RBC QN AUTO: 32.1 PG (ref 25.2–33.5)
MCHC RBC AUTO-ENTMCNC: 32.8 G/DL (ref 28.4–34.8)
MCV RBC AUTO: 97.8 FL (ref 82.6–102.9)
MONOCYTES # BLD: 11 % (ref 3–12)
NRBC AUTOMATED: 0.2 PER 100 WBC
PDW BLD-RTO: 14.9 % (ref 11.8–14.4)
PLATELET # BLD: 868 K/UL (ref 138–453)
PLATELET ESTIMATE: ABNORMAL
PMV BLD AUTO: 9 FL (ref 8.1–13.5)
RBC # BLD: 4.58 M/UL (ref 4.21–5.77)
RBC # BLD: ABNORMAL 10*6/UL
SEG NEUTROPHILS: 67 % (ref 36–65)
SEGMENTED NEUTROPHILS ABSOLUTE COUNT: 8.55 K/UL (ref 1.5–8.1)
WBC # BLD: 12.7 K/UL (ref 3.5–11.3)
WBC # BLD: ABNORMAL 10*3/UL

## 2021-06-08 PROCEDURE — 3017F COLORECTAL CA SCREEN DOC REV: CPT | Performed by: INTERNAL MEDICINE

## 2021-06-08 PROCEDURE — G8419 CALC BMI OUT NRM PARAM NOF/U: HCPCS | Performed by: INTERNAL MEDICINE

## 2021-06-08 PROCEDURE — G8427 DOCREV CUR MEDS BY ELIG CLIN: HCPCS | Performed by: INTERNAL MEDICINE

## 2021-06-08 PROCEDURE — 99214 OFFICE O/P EST MOD 30 MIN: CPT | Performed by: INTERNAL MEDICINE

## 2021-06-08 PROCEDURE — 99211 OFF/OP EST MAY X REQ PHY/QHP: CPT | Performed by: INTERNAL MEDICINE

## 2021-06-08 PROCEDURE — 85025 COMPLETE CBC W/AUTO DIFF WBC: CPT

## 2021-06-08 PROCEDURE — 36415 COLL VENOUS BLD VENIPUNCTURE: CPT

## 2021-06-08 PROCEDURE — 1036F TOBACCO NON-USER: CPT | Performed by: INTERNAL MEDICINE

## 2021-06-08 NOTE — TELEPHONE ENCOUNTER
SHANTELL ARRIVES AMBULATORY FOR MD VISIT  DR Deb Lynn IN TO SEE PATIENT  ORDERS RECEIVED  MAKE HYDREA BID EVERY OTHER DAY ALTERNATING WITH TID EVERY OTHER DAY  RV 4-6 MONTHS W/CBC  LABS CDP 11/9/21  MD VISIT 11/9/21 @1PM  AVS PRINTED AND GIVEN TO PATIENT WITH INSTRUCTIONS  PATIENT DISCHARGED AMBULATORY

## 2021-06-09 NOTE — PROGRESS NOTES
_      Chief Complaint   Patient presents with    Follow-up    Discuss Labs     DIAGNOSIS:       Chronic persistent leukocytosis  Chronic persistent thrombocytosis  Positive JAK2 gene mutation. Myeloproliferative disorder. Essential thrombocythemia. Chronic normocytic anemia  Peripheral vascular disease  Chronic tobacco abuse  Chronic marijuana abuse  Iron deficiency. Peripheral vascular disease. CURRENT THERAPY:         Hydrea  ASA    BRIEF CASE HISTORY:  The patient is a 45 y.o.  male who is admitted to the hospital for elective aortobifemoral bypass surgery. For the essentially patient had problems with increasing shortness of breath and hypoxia. Pulmonary has been consulted. Patient was noted to have  Persistent elevation of white blood cells and platelets. The patient states that  He was told about this problem back in November 2017 in [place]. Obviously no further workup was done. Repeated labs recently showed elevation of white blood cells and platelets. In addition he had normocytic anemia. Patient denies any fever or night sweats. No weight loss or decreased appetite. No palpable lymph nodes. Patient quit smoking 7 months ago. He was a heavy smoker 35 years. He uses marijuana. Denies alcohol drinking. BM test showed essential thrombocytosis. INTERIM HISTORY:         The patient is seen for follow up thrombocytosis. Doing well clinically. No significant complaints. Maintained on hydroxyurea. Tolerated well. No side effects. Clinically stable. No chest pain. No headache. No pain in the lower extremities. No nausea or vomiting. No fever. No bleeding.        Past Medical History:   has a past medical history of Bandemia, CAD (coronary artery disease), Cerebral artery occlusion with cerebral infarction ([address] COPD (chronic obstructive pulmonary disease) ([address] Fracture of metatarsal bone, Gangrene ([address] Hypertension, Kidney disease, chronic, stage III (GFR 30-59 ml/min) ([facility] Moderate malnutrition ([facility]), MRSA (methicillin resistant staph aureus) culture positive, Pain, lower extremity, Peripheral vascular disease ([facility] and Thrombocytosis ([facility] Past Surgical History:   has a past surgical history that includes Coronary angioplasty with stent; other surgical history (10/26/2018); other surgical history; Tonsillectomy; Toe amputation (Right); Aorto-femoral Bypass Graft (12/13/2018); and aortofemoral-popliteal bypass graft (N/A, 12/13/2018). Family History: family history includes Diabetes in his mother; Heart Attack in his father, maternal grandmother, maternal uncle, and paternal grandfather. Social History:   reports that he quit smoking about 3 months ago. His smoking use included cigars. He started smoking about 38 years ago. He has a 37.00 pack-year smoking history. He has never used smokeless tobacco. He reports current drug use. Drug: Marijuana. He reports that he does not drink alcohol. Medications:    Prior to Admission medications    Medication Sig Start Date End Date Taking?  Authorizing Provider   atorvastatin (LIPITOR) 40 MG tablet TAKE 1 TABLET BY MOUTH EVERY DAY 5/7/21  Yes Carol Corey MD   clopidogrel (PLAVIX) 75 MG tablet Take 1 tablet by mouth daily 5/4/21  Yes Sean Harman MD   COMBIVENT RESPIMAT  MCG/ACT AERS inhaler Inhale 1 puff into the lungs every 6 hours 5/4/21  Yes Sean Harman MD   metoprolol tartrate (LOPRESSOR) 25 MG tablet Take 1 tablet by mouth 2 times daily 5/4/21  Yes Sean Harman MD   montelukast (SINGULAIR) 10 MG tablet TAKE 1 TABLET BY MOUTH EVERY DAY AT NIGHT 5/4/21  Yes Sean Harman MD   tiotropium (SPIRIVA HANDIHALER) 18 MCG inhalation capsule Inhale 1 capsule into the lungs daily 5/4/21  Yes Sean Harman MD   hydroxyurea (HYDREA) 500 MG chemo capsule Take 1 capsule by mouth 2 times daily 12/8/20  Yes Domonique Atkinson MD amLODIPine (NORVASC) 10 MG tablet TAKE 1 TABLET BY MOUTH EVERY DAY 4/9/20  Yes Sarika Cardona MD   aspirin 81 MG tablet Take 1 tablet by mouth daily 3/9/20  Yes Radha Taylor MD   traZODone (DESYREL) 50 MG tablet TAKE 1 TABLET BY MOUTH EVERY DAY AT NIGHT 9/20/19  Yes Alli Durbin MD   mometasone-formoterol [facility]) 200-5 MCG/ACT inhaler Inhale 2 puffs into the lungs every 12 hours 9/20/19  Yes Alli Durbin MD   albuterol sulfate HFA (VENTOLIN HFA) 108 (90 Base) MCG/ACT inhaler Inhale 2 puffs into the lungs every 6 hours as needed for Wheezing 9/20/19  Yes Alli Durbin MD   ferrous sulfate (FE TABS 325) 325 (65 Fe) MG EC tablet TAKE 1 TABLET BY MOUTH TWICE A DAY  Patient not taking: Reported on 6/8/2021 5/4/21   Alli Durbin MD     Current Outpatient Medications   Medication Sig Dispense Refill    atorvastatin (LIPITOR) 40 MG tablet TAKE 1 TABLET BY MOUTH EVERY DAY 90 tablet 3    clopidogrel (PLAVIX) 75 MG tablet Take 1 tablet by mouth daily 90 tablet 3    COMBIVENT RESPIMAT  MCG/ACT AERS inhaler Inhale 1 puff into the lungs every 6 hours 5 Inhaler 3    metoprolol tartrate (LOPRESSOR) 25 MG tablet Take 1 tablet by mouth 2 times daily 180 tablet 1    montelukast (SINGULAIR) 10 MG tablet TAKE 1 TABLET BY MOUTH EVERY DAY AT NIGHT 30 tablet 3    tiotropium (SPIRIVA HANDIHALER) 18 MCG inhalation capsule Inhale 1 capsule into the lungs daily 90 capsule 1    hydroxyurea (HYDREA) 500 MG chemo capsule Take 1 capsule by mouth 2 times daily 120 capsule 3    amLODIPine (NORVASC) 10 MG tablet TAKE 1 TABLET BY MOUTH EVERY DAY 90 tablet 3    aspirin 81 MG tablet Take 1 tablet by mouth daily 30 tablet 5    traZODone (DESYREL) 50 MG tablet TAKE 1 TABLET BY MOUTH EVERY DAY AT NIGHT 90 tablet 2    mometasone-formoterol (DULERA) 200-5 MCG/ACT inhaler Inhale 2 puffs into the lungs every 12 hours 3 Inhaler 5    albuterol sulfate HFA (VENTOLIN HFA) 108 (90 Base) MCG/ACT inhaler Inhale 2 98 °F (36.7 °C) (Temporal)   Resp 16   Wt 135 lb 12.8 oz (61.6 kg)   BMI 17.92 kg/m²    Temp (24hrs), Av.9 °F (36.6 °C), Min:97.3 °F (36.3 °C), Max:98.6 °F (37 °C)      General appearance - well appearing, not in pain or distress  Mental status - good mood, alert and oriented  Eyes - pupils equal and reactive, extraocular eye movements intact  Ears - bilateral TM's and external ear canals normal  Nose - normal and patent, no erythema, discharge or polyps  Mouth - mucous membranes moist, pharynx normal without lesions  Neck - supple, no significant adenopathy  Lymphatics - no palpable lymphadenopathy, no hepatosplenomegaly  Chest - clear to auscultation, no wheezes, rales or rhonchi, symmetric air entry  Heart - normal rate, regular rhythm, normal S1, S2, no murmurs, rubs, clicks or gallops  Abdomen - soft, nontender, nondistended, no masses or organomegaly  Neurological - alert, oriented, normal speech, no focal findings or movement disorder noted  Musculoskeletal - no joint tenderness, deformity or swelling.   Status post recent vascular surgery as above  Extremities - peripheral pulses normal, no pedal edema, no clubbing or cyanosis  Skin - normal coloration and turgor, no rashes, no suspicious skin lesions noted           DATA:      Labs:     Lab Results   Component Value Date    WBC 12.7 (H) 2021    HGB 14.7 2021    HCT 44.8 2021    MCV 97.8 2021     (H) 2021       Chemistry        Component Value Date/Time     2018 0609    K 4.4 2018 0609    CL 96 (L) 2018 0609    CO2 23 2018 0609    BUN 19 2018 0609    CREATININE 1.23 (H) 2018 0609        Component Value Date/Time    CALCIUM 9.6 2018 0609    ALKPHOS 138 (H) 2018 1412    AST 30 2018 1412    ALT 46 (H) 2018 1412    BILITOT 0.97 2018 1412        JAK2 gene mutation 18:  2018  5:58 PM - Cesario, Bernardo Incoming Lab Results From [place] these abnormalities in layman language. Myeloproliferative disorder. Explained the nature of essential thrombocythemia. 2. Patient had very good initial response to hydroxyurea. The dose of hydroxyurea was previously adjusted due to development of anemia. Patient is being maintained on hydroxyurea twice daily. Labs from today showed rising platelet counts. MCV is normal. Patient states that he is taking hydroxyurea twice daily. With that I would increase the dose of hydroxyurea to be alternating days with twice daily alternating with three times daily. We will monitor labs closely. 3. With patient's history of chronic tobacco abuse, he was expected to have erythrocytosis. On the contrary patient is having a normocytic anemia. He has iron deficiency. He was given IV iron. 4. RV 4-6 months with CBC. 5. Patient's questions were answered to the best of his satisfaction and he verbalized full understanding and agreement. 6 [facility]                            This note is created with the assistance of a speech recognition program.  While intending to generate a document that actually reflects the content of the visit, the document can still have some errors including those of syntax and sound a like substitutions which may escape proof reading. It such instances, actual meaning can be extrapolated by contextual diversion.

## 2021-06-30 ENCOUNTER — TELEPHONE (OUTPATIENT)
Dept: INTERNAL MEDICINE | Age: 51
End: 2021-06-30

## 2021-06-30 NOTE — TELEPHONE ENCOUNTER
PC to patient to schedule NTP appt to establish care, manuel stated he will be switching over to a provider at [facility].

## 2021-09-02 RX ORDER — ASPIRIN 81 MG/1
TABLET, CHEWABLE ORAL
Qty: 30 TABLET | Refills: 2 | OUTPATIENT
Start: 2021-09-02

## 2021-09-24 RX ORDER — HYDROXYUREA 500 MG/1
500 CAPSULE ORAL 2 TIMES DAILY
Qty: 120 CAPSULE | Refills: 3 | Status: SHIPPED | OUTPATIENT
Start: 2021-09-24 | End: 2021-09-24 | Stop reason: SDUPTHER

## 2021-09-24 RX ORDER — HYDROXYUREA 500 MG/1
500 CAPSULE ORAL 2 TIMES DAILY
Qty: 120 CAPSULE | Refills: 3 | Status: SHIPPED | OUTPATIENT
Start: 2021-09-24 | End: 2022-05-31

## 2021-11-03 ENCOUNTER — HOSPITAL ENCOUNTER (OUTPATIENT)
Facility: MEDICAL CENTER | Age: 51
End: 2021-11-03
Payer: MEDICARE

## 2021-11-09 ENCOUNTER — HOSPITAL ENCOUNTER (OUTPATIENT)
Facility: MEDICAL CENTER | Age: 51
Discharge: HOME | End: 2021-11-09
Payer: MEDICARE

## 2021-11-09 ENCOUNTER — TELEPHONE (OUTPATIENT)
Dept: ONCOLOGY | Age: 51
End: 2021-11-09

## 2021-11-09 ENCOUNTER — OFFICE VISIT (OUTPATIENT)
Dept: ONCOLOGY | Age: 51
End: 2021-11-09
Payer: MEDICARE

## 2021-11-09 VITALS
DIASTOLIC BLOOD PRESSURE: 86 MMHG | SYSTOLIC BLOOD PRESSURE: 139 MMHG | HEART RATE: 60 BPM | BODY MASS INDEX: 18.77 KG/M2 | RESPIRATION RATE: 16 BRPM | WEIGHT: 142.3 LBS | TEMPERATURE: 97.4 F

## 2021-11-09 DIAGNOSIS — D47.3 ESSENTIAL THROMBOCYTHEMIA (HCC): Primary | ICD-10-CM

## 2021-11-09 LAB
ABSOLUTE EOS #: 0.11 K/UL (ref 0–0.44)
ABSOLUTE IMMATURE GRANULOCYTE: 0.06 K/UL (ref 0–0.3)
ABSOLUTE LYMPH #: 1.43 K/UL (ref 1.1–3.7)
ABSOLUTE MONO #: 0.39 K/UL (ref 0.1–1.2)
BASOPHILS # BLD: 2 % (ref 0–2)
BASOPHILS ABSOLUTE: 0.11 K/UL (ref 0–0.2)
DIFFERENTIAL TYPE: ABNORMAL
EOSINOPHILS RELATIVE PERCENT: 2 % (ref 1–4)
HCT VFR BLD CALC: 33.1 % (ref 40.7–50.3)
HEMOGLOBIN: 11.4 G/DL (ref 13–17)
IMMATURE GRANULOCYTES: 1 %
LYMPHOCYTES # BLD: 26 % (ref 24–43)
MCH RBC QN AUTO: 45.1 PG (ref 25.2–33.5)
MCHC RBC AUTO-ENTMCNC: 34.4 G/DL (ref 28.4–34.8)
MCV RBC AUTO: 130.8 FL (ref 82.6–102.9)
MONOCYTES # BLD: 7 % (ref 3–12)
MORPHOLOGY: ABNORMAL
NRBC AUTOMATED: 0 PER 100 WBC
PDW BLD-RTO: 15.6 % (ref 11.8–14.4)
PLATELET # BLD: 276 K/UL (ref 138–453)
PLATELET ESTIMATE: ABNORMAL
PMV BLD AUTO: 8.8 FL (ref 8.1–13.5)
RBC # BLD: 2.53 M/UL (ref 4.21–5.77)
RBC # BLD: ABNORMAL 10*6/UL
SEG NEUTROPHILS: 62 % (ref 36–65)
SEGMENTED NEUTROPHILS ABSOLUTE COUNT: 3.4 K/UL (ref 1.5–8.1)
WBC # BLD: 5.5 K/UL (ref 3.5–11.3)
WBC # BLD: ABNORMAL 10*3/UL

## 2021-11-09 PROCEDURE — 99214 OFFICE O/P EST MOD 30 MIN: CPT | Performed by: INTERNAL MEDICINE

## 2021-11-09 PROCEDURE — 1036F TOBACCO NON-USER: CPT | Performed by: INTERNAL MEDICINE

## 2021-11-09 PROCEDURE — 3017F COLORECTAL CA SCREEN DOC REV: CPT | Performed by: INTERNAL MEDICINE

## 2021-11-09 PROCEDURE — 99211 OFF/OP EST MAY X REQ PHY/QHP: CPT

## 2021-11-09 PROCEDURE — 99211 OFF/OP EST MAY X REQ PHY/QHP: CPT | Performed by: INTERNAL MEDICINE

## 2021-11-09 PROCEDURE — G8484 FLU IMMUNIZE NO ADMIN: HCPCS | Performed by: INTERNAL MEDICINE

## 2021-11-09 PROCEDURE — G8420 CALC BMI NORM PARAMETERS: HCPCS | Performed by: INTERNAL MEDICINE

## 2021-11-09 PROCEDURE — 85025 COMPLETE CBC W/AUTO DIFF WBC: CPT

## 2021-11-09 PROCEDURE — G8427 DOCREV CUR MEDS BY ELIG CLIN: HCPCS | Performed by: INTERNAL MEDICINE

## 2021-11-09 PROCEDURE — 36415 COLL VENOUS BLD VENIPUNCTURE: CPT

## 2021-11-09 RX ORDER — GLYCOPYRROLATE 25 UG/ML
SOLUTION RESPIRATORY (INHALATION)
COMMUNITY
Start: 2021-09-01

## 2021-11-10 DIAGNOSIS — D47.3 ESSENTIAL THROMBOCYTHEMIA (HCC): Primary | ICD-10-CM

## 2021-11-10 NOTE — PROGRESS NOTES
_      No chief complaint on file. DIAGNOSIS:       Chronic persistent leukocytosis  Chronic persistent thrombocytosis  Positive JAK2 gene mutation. Myeloproliferative disorder. Essential thrombocythemia. Chronic normocytic anemia  Peripheral vascular disease  Chronic tobacco abuse  Chronic marijuana abuse  Iron deficiency. Peripheral vascular disease. CURRENT THERAPY:         Hydrea  ASA    BRIEF CASE HISTORY:  The patient is a 45 y.o.  male who is admitted to the hospital for elective aortobifemoral bypass surgery. For the essentially patient had problems with increasing shortness of breath and hypoxia. Pulmonary has been consulted. Patient was noted to have  Persistent elevation of white blood cells and platelets. The patient states that  He was told about this problem back in November 2017 in [place]. Obviously no further workup was done. Repeated labs recently showed elevation of white blood cells and platelets. In addition he had normocytic anemia. Patient denies any fever or night sweats. No weight loss or decreased appetite. No palpable lymph nodes. Patient quit smoking 7 months ago. He was a heavy smoker 35 years. He uses marijuana. Denies alcohol drinking. BM test showed essential thrombocytosis. INTERIM HISTORY:         The patient is seen for follow up thrombocytosis. Doing well clinically. No significant complaints. Maintained on hydroxyurea. Tolerated well. No side effects. Clinically stable. No chest pain. No headache. No pain in the lower extremities. No nausea or vomiting. No fever. No bleeding.        Past Medical History:   has a past medical history of Bandemia, CAD (coronary artery disease), Cerebral artery occlusion with cerebral infarction ([address] COPD (chronic obstructive pulmonary disease) ([address] Fracture of metatarsal bone, Gangrene ([address] Hypertension, Kidney disease, chronic, stage III (GFR 30-59 ml/min) (HCC), Moderate malnutrition (HCC), MRSA (methicillin resistant staph aureus) culture positive, Pain, lower extremity, Peripheral vascular disease ([address] and Thrombocytosis. Past Surgical History:   has a past surgical history that includes Coronary angioplasty with stent; other surgical history (10/26/2018); other surgical history; Tonsillectomy; Toe amputation (Right); Aorto-femoral Bypass Graft (12/13/2018); and aortofemoral-popliteal bypass graft (N/A, 12/13/2018). Family History: family history includes Diabetes in his mother; Heart Attack in his father, maternal grandmother, maternal uncle, and paternal grandfather. Social History:   reports that he quit smoking about 8 months ago. His smoking use included cigars. He started smoking about 38 years ago. He has a 37.00 pack-year smoking history. He has never used smokeless tobacco. He reports current drug use. Drug: Marijuana Candiss Littler). He reports that he does not drink alcohol. Medications:    Prior to Admission medications    Medication Sig Start Date End Date Taking?  Authorizing Provider   Rm Bajwamussen REFILL KIT 25 MCG/ML SOLN  9/1/21  Yes Historical Provider, MD   hydroxyurea (HYDREA) 500 MG chemo capsule Take 1 capsule by mouth 2 times daily 9/24/21  Yes Lizzy Kate MD   atorvastatin (LIPITOR) 40 MG tablet TAKE 1 TABLET BY MOUTH EVERY DAY 5/7/21  Yes Fabby Smith MD   clopidogrel (PLAVIX) 75 MG tablet Take 1 tablet by mouth daily 5/4/21  Yes King Ivan MD   COMBIVENT RESPIMAT  MCG/ACT AERS inhaler Inhale 1 puff into the lungs every 6 hours 5/4/21  Yes King Ivan MD   metoprolol tartrate (LOPRESSOR) 25 MG tablet Take 1 tablet by mouth 2 times daily 5/4/21  Yes King Ivan MD   montelukast (SINGULAIR) 10 MG tablet TAKE 1 TABLET BY MOUTH EVERY DAY AT NIGHT 5/4/21  Yes King Ivan MD   amLODIPine (NORVASC) 10 MG tablet TAKE 1 TABLET BY MOUTH EVERY DAY 4/9/20  Yes Shyam De Los Santos MD aspirin 81 MG tablet Take 1 tablet by mouth daily 3/9/20  Yes Elo Mathews MD   albuterol sulfate HFA (VENTOLIN HFA) 108 (90 Base) MCG/ACT inhaler Inhale 2 puffs into the lungs every 6 hours as needed for Wheezing 9/20/19  Yes Yaz Arzate MD     Current Outpatient Medications   Medication Sig Dispense Refill    LONMELIA MAGNAIR REFILL KIT 25 MCG/ML SOLN       hydroxyurea (HYDREA) 500 MG chemo capsule Take 1 capsule by mouth 2 times daily 120 capsule 3    atorvastatin (LIPITOR) 40 MG tablet TAKE 1 TABLET BY MOUTH EVERY DAY 90 tablet 3    clopidogrel (PLAVIX) 75 MG tablet Take 1 tablet by mouth daily 90 tablet 3    COMBIVENT RESPIMAT  MCG/ACT AERS inhaler Inhale 1 puff into the lungs every 6 hours 5 Inhaler 3    metoprolol tartrate (LOPRESSOR) 25 MG tablet Take 1 tablet by mouth 2 times daily 180 tablet 1    montelukast (SINGULAIR) 10 MG tablet TAKE 1 TABLET BY MOUTH EVERY DAY AT NIGHT 30 tablet 3    amLODIPine (NORVASC) 10 MG tablet TAKE 1 TABLET BY MOUTH EVERY DAY 90 tablet 3    aspirin 81 MG tablet Take 1 tablet by mouth daily 30 tablet 5    albuterol sulfate HFA (VENTOLIN HFA) 108 (90 Base) MCG/ACT inhaler Inhale 2 puffs into the lungs every 6 hours as needed for Wheezing 1 Inhaler 5     No current facility-administered medications for this visit. Allergies:  Patient has no known allergies. REVIEW OF SYSTEMS:      · General: No weakness or fatigue. No unanticipated weight loss or decreased appetite. No fever or chills. · Eyes: No blurred vision, eye pain or double vision. · Ears: No hearing problems or drainage. No tinnitus. · Throat: No sore throat, problems with swallowing or dysphagia. · Respiratory: No cough, sputum or hemoptysis. Positive for shortness of breath. No pleuritic chest pain. · Cardiovascular: No chest pain, orthopnea or PND. No lower extremity edema. No palpitation. · Gastrointestinal: No problems with swallowing. No abdominal pain or bloating.  No nausea or vomiting. No diarrhea or constipation. No GI bleeding. · Genitourinary: No dysuria, hematuria, frequency or urgency. · Musculoskeletal: No muscle aches or pains. No limitation of movement. No back pain. No gait disturbance, No joint complaints. · Dermatologic: No skin rashes or pruritus. No skin lesions or discolorations. · Psychiatric: No depression, anxiety, or stress or signs of schizophrenia. No change in mood or affect. · Hematologic: No history of bleeding tendency. No bruises or ecchymosis. No history of clotting problems. · Infectious disease: No fever, chills or frequent infections. · Endocrine: No problems with obesity. No polydipsia or polyuria. No temperature intolerance. · Neurologic: No headaches or dizziness. No weakness or numbness of the extremities. No changes in balance, coordination,  memory, mentation, behavior. · Allergic/Immunologic: No nasal congestion or hives. No repeated infections.        PHYSICAL EXAM:      /86   Pulse 60   Temp 97.4 °F (36.3 °C) (Temporal)   Resp 16   Wt 142 lb 4.8 oz (64.5 kg)   BMI 18.77 kg/m²    Temp (24hrs), Av.9 °F (36.6 °C), Min:97.3 °F (36.3 °C), Max:98.6 °F (37 °C)      General appearance - well appearing, not in pain or distress  Mental status - good mood, alert and oriented  Eyes - pupils equal and reactive, extraocular eye movements intact  Ears - bilateral TM's and external ear canals normal  Nose - normal and patent, no erythema, discharge or polyps  Mouth - mucous membranes moist, pharynx normal without lesions  Neck - supple, no significant adenopathy  Lymphatics - no palpable lymphadenopathy, no hepatosplenomegaly  Chest - clear to auscultation, no wheezes, rales or rhonchi, symmetric air entry  Heart - normal rate, regular rhythm, normal S1, S2, no murmurs, rubs, clicks or gallops  Abdomen - soft, nontender, nondistended, no masses or organomegaly  Neurological - alert, oriented, normal speech, no focal findings or movement disorder noted  Musculoskeletal - no joint tenderness, deformity or swelling. Status post recent vascular surgery as above  Extremities - peripheral pulses normal, no pedal edema, no clubbing or cyanosis  Skin - normal coloration and turgor, no rashes, no suspicious skin lesions noted           DATA:      Labs:     Lab Results   Component Value Date    WBC 5.5 11/09/2021    HGB 11.4 (L) 11/09/2021    HCT 33.1 (L) 11/09/2021    .8 (H) 11/09/2021     11/09/2021       Chemistry        Component Value Date/Time     12/18/2018 0609    K 4.4 12/18/2018 0609    CL 96 (L) 12/18/2018 0609    CO2 23 12/18/2018 0609    BUN 19 12/18/2018 0609    CREATININE 1.23 (H) 12/18/2018 0609        Component Value Date/Time    CALCIUM 9.6 12/18/2018 0609    ALKPHOS 138 (H) 11/27/2018 1412    AST 30 11/27/2018 1412    ALT 46 (H) 11/27/2018 1412    BILITOT 0.97 11/27/2018 1412        JAK2 gene mutation 12/17/18:  12/23/2018  5:58 PM - [facility], [facility] Incoming Lab Results From [facility]     Component Results     Component Value Ref Range & Units Status Collected Lab   V617F Mutation, Qnt 21.3  % Final 12/17/2018  5:59 AM [facility]   (NOTE)   There is evidence of the JAK2 (V617F) point mutation by real-time   PCR analysis. This result has been reviewed and approved by Prabhjot Enrique M.D.,   Ph.D.   Background Information: JAK2 Gene, V617F Mutation, Quantitative   Test information: This assay is designed to detect the point mutation   c.1849G>T (V617F) of the JAK2 gene. JAK2 V617F mutations   are present in patients with myeloproliferative neoplasms. Methodology:        BM test 1/15/19:  Final Diagnosis   PERIPHERAL BLOOD:   -SEVERE THROMBOCYTOSIS. -MILD NEUTROPHILIA. -MILD EOSINOPHILIA. -MILD NORMOCYTIC ANEMIA.      BONE MARROW BIOPSY, ASPIRATE SMEAR AND CLOT SECTION:   -MILDLY HYPOCELLULAR HEMATOPOIETIC MARROW WITH MILD RELATIVE   MYELOID HYPERPLASIA/ ERYTHROID HYPOPLASIA, ENLARGED AND INCREASED MEGAKARYOCYTES, AND IRON DEPLETION. -COMPATIBLE WITH MYELOPROLIFERATIVE NEOPLASM ((ESSENTIAL   THROMBOCYTHEMIA). COMMENT:  THE PATIENT HAS MARKED PERSISTENT THROMBOCYTOSIS AND MILD   NEUTROPHILIA.  BCR-ABL BY RT-PCR IS NEGATIVE.  JAK2 V617F MUTATION IS   POSITIVE.  THE BONE MARROW HAS INCREASED MEGAKARYOCYTES WHICH ARE   ENLARGED AND HAVE HYPERLOBATE NUCLEI.  THE FINDINGS ARE COMPATIBLE   WITH MYELOPROLIFERATIVE NEOPLASM (ESSENTIAL THROMBOCYTHEMIA) .          IMPRESSION:    Chronic persistent leukocytosis  Chronic persistent thrombocytosis  Positive JAK2 gene mutation. Myeloproliferative disorder. Essential thrombocythemia. Chronic normocytic anemia  Peripheral vascular disease  Chronic tobacco abuse  Chronic marijuana abuse  Iron deficiency. Peripheral vascular disease. RECOMMENDATIONS:  1. I reviewed the labs as above and discussed with the patient. I explained results of BM test. I explained to the patient the nature of this hematologic problem. I explained the significance of these abnormalities in layman language. Myeloproliferative disorder. Explained the nature of essential thrombocythemia. 2. Patient had very good initial response to hydroxyurea. The dose of hydroxyurea was previously adjusted due to development of anemia. Patient is being maintained on hydroxyurea twice daily alternating with three times daily. Repeated labs showed normal white blood cells and platelets but started having mild anemia. I will cut down the dose to be twice daily and we will continue to monitor labs. 3. With patient's history of chronic tobacco abuse, he was expected to have erythrocytosis. On the contrary patient is having a normocytic anemia. He has iron deficiency. He was given IV iron. 4. RV 4-6 months with CBC. 5. Patient's questions were answered to the best of his satisfaction and he verbalized full understanding and agreement.                               Stephan Prajapati MD [facility]                            This note is created with the assistance of a speech recognition program.  While intending to generate a document that actually reflects the content of the visit, the document can still have some errors including those of syntax and sound a like substitutions which may escape proof reading. It such instances, actual meaning can be extrapolated by contextual diversion.

## 2022-05-02 ENCOUNTER — HOSPITAL ENCOUNTER (OUTPATIENT)
Facility: MEDICAL CENTER | Age: 52
End: 2022-05-02

## 2022-05-02 DIAGNOSIS — I10 ESSENTIAL HYPERTENSION: ICD-10-CM

## 2022-05-25 ENCOUNTER — HOSPITAL ENCOUNTER (OUTPATIENT)
Facility: MEDICAL CENTER | Age: 52
End: 2022-05-25

## 2022-05-26 DIAGNOSIS — I73.9 PVD (PERIPHERAL VASCULAR DISEASE) (HCC): ICD-10-CM

## 2022-05-31 RX ORDER — ATORVASTATIN CALCIUM 40 MG/1
TABLET, FILM COATED ORAL
Qty: 30 TABLET | Refills: 3 | Status: SHIPPED | OUTPATIENT
Start: 2022-05-31 | End: 2022-10-30

## 2022-05-31 RX ORDER — HYDROXYUREA 500 MG/1
500 CAPSULE ORAL 2 TIMES DAILY
Qty: 120 CAPSULE | Refills: 3 | Status: SHIPPED | OUTPATIENT
Start: 2022-05-31

## 2022-06-06 ENCOUNTER — HOSPITAL ENCOUNTER (OUTPATIENT)
Facility: MEDICAL CENTER | Age: 52
End: 2022-06-06

## 2022-07-08 DIAGNOSIS — I73.9 PVD (PERIPHERAL VASCULAR DISEASE) (HCC): ICD-10-CM

## 2022-07-08 RX ORDER — CLOPIDOGREL BISULFATE 75 MG/1
TABLET ORAL
Qty: 30 TABLET | Refills: 3 | OUTPATIENT
Start: 2022-07-08

## 2022-08-10 DIAGNOSIS — I73.9 PVD (PERIPHERAL VASCULAR DISEASE) (HCC): ICD-10-CM

## 2022-08-11 RX ORDER — CLOPIDOGREL BISULFATE 75 MG/1
TABLET ORAL
Qty: 30 TABLET | Refills: 3 | OUTPATIENT
Start: 2022-08-11

## 2022-08-15 DIAGNOSIS — I73.9 PVD (PERIPHERAL VASCULAR DISEASE) (HCC): ICD-10-CM

## 2022-08-15 RX ORDER — CLOPIDOGREL BISULFATE 75 MG/1
TABLET ORAL
Qty: 30 TABLET | Refills: 3 | OUTPATIENT
Start: 2022-08-15

## 2022-10-18 ENCOUNTER — TELEPHONE (OUTPATIENT)
Dept: ONCOLOGY | Age: 52
End: 2022-10-18

## 2022-10-18 NOTE — TELEPHONE ENCOUNTER
WRITER RECEIVED *NO SHOW* LETTER THAT WAS RETURNED TO [facility]. WRITER TRIED CALLING PATIENT TO VERIFY ADDRESS, BUT HAD TO LEAVE A VM AS NO ANSWER.

## 2022-10-21 DIAGNOSIS — I73.9 PVD (PERIPHERAL VASCULAR DISEASE) (HCC): ICD-10-CM

## 2022-10-30 RX ORDER — ATORVASTATIN CALCIUM 40 MG/1
TABLET, FILM COATED ORAL
Qty: 30 TABLET | Refills: 3 | Status: SHIPPED | OUTPATIENT
Start: 2022-10-30

## 2022-11-18 ENCOUNTER — TELEPHONE (OUTPATIENT)
Dept: ONCOLOGY | Age: 52
End: 2022-11-18

## 2022-11-18 NOTE — TELEPHONE ENCOUNTER
PATIENT CALLED AND LEFT A VM ON 11/18/22 AT 3:32 PM WANTING TO SCHEDULE AND APPOINTMENT WITH DR. Amna Hernandez. WRITER TRIED CALLING PATIENT BACK, BUT NO ANSWER. VM WAS LEFT FOR PATIENT TO CALL BACK.

## 2023-06-22 ENCOUNTER — TELEPHONE (OUTPATIENT)
Dept: INFUSION THERAPY | Facility: MEDICAL CENTER | Age: 53
End: 2023-06-22

## 2023-06-22 NOTE — TELEPHONE ENCOUNTER
Faxed request received from pharmacy for refill of Hydroxurea back to [facility]. Informed are not authorizing refill. Patient will need MD visit before additional medications are prescribed.

## (undated) DEVICE — CHLORAPREP 26ML ORANGE

## (undated) DEVICE — GEL US 20GM NONIRRITATING OVERWRAPPED FILE PCH TRNSMIT

## (undated) DEVICE — 3M™ TEGADERM™ CHG DRESSING 25/CARTON 4 CARTONS/CASE 1657: Brand: TEGADERM™

## (undated) DEVICE — TOWEL,OR,DSP,ST,BLUE,STD,4/PK,20PK/CS: Brand: MEDLINE

## (undated) DEVICE — DRESSING TRNSPAR W2XL2.75IN FLM SHT SEMIPERMEABLE WIND

## (undated) DEVICE — BLADE CLIPPER GEN PURP NS

## (undated) DEVICE — Device

## (undated) DEVICE — LOOP VES W25MM THK1MM MAXI RED SIL FLD REPELLENT 100 PER

## (undated) DEVICE — SUTURE NONABSORBABLE MONOFILAMENT 4-0 RB-1 36 IN BLU PROLENE 8557H

## (undated) DEVICE — CANNULA PERF L2IN BLNT TIP 2MM VES CLR RADPQ BODY FEM LUER

## (undated) DEVICE — CLIPVAC PRESURG HAIR REMOVAL

## (undated) DEVICE — PRESSURE MONITORING SET: Brand: TRUWAVE

## (undated) DEVICE — CATHETER,URETHRAL,REDRUBBER,STRL,16FR: Brand: MEDLINE

## (undated) DEVICE — PAD,ABDOMINAL,5"X9",ST,LF,25/BX: Brand: MEDLINE INDUSTRIES, INC.

## (undated) DEVICE — GLOVE SURG SZ 85 L12IN FNGR THK87MIL WHT LTX FREE

## (undated) DEVICE — GOWN,SIRUS,NON REINFRCD,LARGE,SET IN SL: Brand: MEDLINE

## (undated) DEVICE — SHEET, T, LAPAROTOMY, STERILE: Brand: MEDLINE

## (undated) DEVICE — GLOVE SURG SZ 75 L12IN FNGR THK87MIL WHT LTX FREE

## (undated) DEVICE — GARMENT,MEDLINE,DVT,INT,CALF,MED, GEN2: Brand: MEDLINE

## (undated) DEVICE — METER,URINE,400ML,DRAIN BAG,L/F,LL: Brand: MEDLINE

## (undated) DEVICE — SYRINGE IRRIG 60ML SFT PLIABLE BLB EZ TO GRP 1 HND USE W/

## (undated) DEVICE — TOTAL TRAY, DB, 100% SILI FOLEY, 16FR 10: Brand: MEDLINE

## (undated) DEVICE — PLEDGET SURG W3.5XL7MM THK1.5MM WHT PTFE RECT SFT TFE

## (undated) DEVICE — LARGE VISCERA RETAINER: Brand: VISCERA RETAINER, FISH

## (undated) DEVICE — PROBE COVER KIT: Brand: MEDLINE INDUSTRIES, INC.

## (undated) DEVICE — SUTURE PDS II SZ 1 L36IN ABSRB VLT L48MM CTX 1/2 CIR Z371T

## (undated) DEVICE — CONTAINER,SPECIMEN,OR STERILE,4OZ: Brand: MEDLINE

## (undated) DEVICE — 500ML,PRESSURE INFUSER W/STOPCOCK: Brand: MEDLINE

## (undated) DEVICE — ADAPTER,CATHETER/SYRINGE/LUER,STERILE: Brand: MEDLINE

## (undated) DEVICE — 3M™ WARMING BLANKET, UPPER BODY, 10 PER CASE, 42268: Brand: BAIR HUGGER™

## (undated) DEVICE — YANKAUER,POOLE TIP,STERILE,50/CS: Brand: MEDLINE

## (undated) DEVICE — TUBING, SUCTION, 1/4" X 12', STRAIGHT: Brand: MEDLINE

## (undated) DEVICE — AGENT HEMSTAT W2XL14IN OXIDIZED REGENERATED CELOS ABSRB FOR

## (undated) DEVICE — GAUZE,SPONGE,FLUFF,6"X6.75",STRL,5/TRAY: Brand: MEDLINE

## (undated) DEVICE — E-Z CLEAN, NON-STICK, PTFE COATED, ELECTROSURGICAL BLADE ELECTRODE, 6.5 INCH (16.5 CM): Brand: MEGADYNE

## (undated) DEVICE — SPONGE LAP W18XL18IN WHT COT 4 PLY FLD STRUNG RADPQ DISP ST

## (undated) DEVICE — YANKAUER,FLEXIBLE HANDLE,REGLR CAPACITY: Brand: MEDLINE INDUSTRIES, INC.

## (undated) DEVICE — VI-DRAPE ISOLATION BAG: Brand: CONVERTORS

## (undated) DEVICE — FOGARTY - HYDRAGRIP SURGICAL - CLAMP INSERTS: Brand: FOGARTY HYDRAJAW

## (undated) DEVICE — GLOVE SURG SZ 8 L12IN FNGR THK87MIL WHT LTX FREE